# Patient Record
Sex: FEMALE | Race: WHITE | Employment: FULL TIME | URBAN - METROPOLITAN AREA
[De-identification: names, ages, dates, MRNs, and addresses within clinical notes are randomized per-mention and may not be internally consistent; named-entity substitution may affect disease eponyms.]

---

## 2017-02-28 ENCOUNTER — OFFICE VISIT (OUTPATIENT)
Dept: FAMILY MEDICINE CLINIC | Facility: CLINIC | Age: 37
End: 2017-02-28

## 2017-02-28 VITALS
HEIGHT: 66 IN | TEMPERATURE: 98 F | HEART RATE: 72 BPM | SYSTOLIC BLOOD PRESSURE: 116 MMHG | DIASTOLIC BLOOD PRESSURE: 68 MMHG | BODY MASS INDEX: 34.23 KG/M2 | RESPIRATION RATE: 18 BRPM | OXYGEN SATURATION: 98 % | WEIGHT: 213 LBS

## 2017-02-28 DIAGNOSIS — J11.1 INFLUENZA-LIKE ILLNESS: ICD-10-CM

## 2017-02-28 DIAGNOSIS — J02.9 SORE THROAT: Primary | ICD-10-CM

## 2017-02-28 LAB
CONTROL LINE PRESENT WITH A CLEAR BACKGROUND (YES/NO): YES YES/NO
STREP GRP A CUL-SCR: NEGATIVE

## 2017-02-28 PROCEDURE — 87880 STREP A ASSAY W/OPTIC: CPT | Performed by: PHYSICIAN ASSISTANT

## 2017-02-28 PROCEDURE — 99213 OFFICE O/P EST LOW 20 MIN: CPT | Performed by: PHYSICIAN ASSISTANT

## 2017-02-28 RX ORDER — OSELTAMIVIR PHOSPHATE 75 MG/1
75 CAPSULE ORAL 2 TIMES DAILY
Qty: 10 CAPSULE | Refills: 0 | Status: SHIPPED | OUTPATIENT
Start: 2017-02-28 | End: 2017-03-05

## 2017-02-28 NOTE — PROGRESS NOTES
Patient presents with:  Sore Throat: aches, ear pain, x 1 day  :    HPI:   Danette Gonsales is a 39year old female who presents with upper respiratory symptoms for  1  day. States that there has been multiple flu cases at the  where she works.  Patient auscultation. No wheezing, rales or rhonchi. CARDIO: RRR without murmur  GI: good BS's,no masses, HSM or tenderness    ASSESSMENT AND PLAN:   Garima Armijo is a 39year old female who presents with  Influenza-like illness.    Patient has elected to start Ta

## 2017-02-28 NOTE — PATIENT INSTRUCTIONS
Influenza (Adult)    Influenza is also called the flu. It is a viral illness that affects the air passages of your lungs. It is different from the common cold. The flu can easily be passed from one to person to another.  It may be spread through the air b If you are 72 or older, talk with your provider about getting a pneumococcal vaccine every 5 years. You should also get this vaccine if you have chronic asthma or COPD. All adults should get a flu vaccine every fall. Ask your provider about this.   When to

## 2017-04-01 ENCOUNTER — PATIENT MESSAGE (OUTPATIENT)
Dept: FAMILY MEDICINE CLINIC | Facility: CLINIC | Age: 37
End: 2017-04-01

## 2017-04-03 NOTE — TELEPHONE ENCOUNTER
From: Danette Gonsales  To: Berkley Galdamez  Sent: 4/1/2017 8:06 PM CDT  Subject: Non-Urgent Medical Question    I have been working out for over a year. When I first started my plantar fasciites was very painful.  It has improved so much I don't have

## 2017-04-06 ENCOUNTER — OFFICE VISIT (OUTPATIENT)
Dept: FAMILY MEDICINE CLINIC | Facility: CLINIC | Age: 37
End: 2017-04-06

## 2017-04-06 VITALS
SYSTOLIC BLOOD PRESSURE: 112 MMHG | HEART RATE: 76 BPM | DIASTOLIC BLOOD PRESSURE: 86 MMHG | RESPIRATION RATE: 16 BRPM | TEMPERATURE: 99 F

## 2017-04-06 DIAGNOSIS — R20.0 BILATERAL LEG NUMBNESS: Primary | ICD-10-CM

## 2017-04-06 PROCEDURE — 99213 OFFICE O/P EST LOW 20 MIN: CPT | Performed by: PHYSICIAN ASSISTANT

## 2017-04-06 NOTE — PROGRESS NOTES
[facility] Internal Medicine Progress Note    CC:  Patient presents with:  Numbness: Bilateral feet/toes numbness/tingling when working out x2-3 months. No change to color or ability to move.       HPI:   HPI  Bilateral numbness toes/feet 2-3 grisel rub.    No murmur heard. Pulmonary/Chest: Effort normal and breath sounds normal. No respiratory distress. She has no wheezes. She has no rales.    Musculoskeletal:   + strength, sensation, DTRs, pulse intact to bilateral LEs   Lymphadenopathy:     She has

## 2017-04-06 NOTE — PATIENT INSTRUCTIONS
Thank you for choosing Castro Mckeon PA-C at [place] [address]  To Do: Dayday Jean  1. Pt to get EMG done  2. Piriformis testing  3.  Follow-up in 1 month, sooner if problems    • Please signup for MY CHART, which is electronic access to your luis • Please call our office about any questions regarding your treatment/medicines/tests as a result of today's visit.  For your safety, read the entire package insert of all medicines prescribed to you and be aware of all of the risks of treatment even beyon 1. Lie on your back on the floor, with your knees bent and feet flat on the floor. Don’t press your neck or lower back to the floor. 2. Rest your right ankle on your left knee. 3. Place a towel around the back of your left thigh.  Pull on the ends of the

## 2017-04-18 ENCOUNTER — OFFICE VISIT (OUTPATIENT)
Dept: NEUROLOGY | Facility: CLINIC | Age: 37
End: 2017-04-18

## 2017-04-18 DIAGNOSIS — R20.2 PARESTHESIA OF BOTH FEET: Primary | ICD-10-CM

## 2017-04-18 PROCEDURE — 95886 MUSC TEST DONE W/N TEST COMP: CPT | Performed by: OTHER

## 2017-04-18 PROCEDURE — 95909 NRV CNDJ TST 5-6 STUDIES: CPT | Performed by: OTHER

## 2017-04-18 NOTE — PROCEDURES
Date of service: 4/18/2017    Procedure: Nerve Conduction Study and Electromyography - complete EMG study in bilateral lower extremities. History: Electrodiagnostic testing on this 39year old female was performed in bilateral lower extremities.  The pat L. Tibialis anterior Deep peroneal (Fibular) L4-L5 N None None None None N N N N   R. Tibialis anterior Deep peroneal (Fibular) L4-L5 N None None None None N N N N   L. Peroneus longus Perineal L5-S1 N None None None None N N N N   R.  Peroneus longus Perin

## 2017-04-27 ENCOUNTER — PATIENT MESSAGE (OUTPATIENT)
Dept: FAMILY MEDICINE CLINIC | Facility: CLINIC | Age: 37
End: 2017-04-27

## 2017-04-28 NOTE — TELEPHONE ENCOUNTER
From: Geovanni Valle  To: Jammie Yoon  Sent: 4/27/2017 4:50 PM CDT  Subject: Non-Urgent Medical Question    Couple of questions for you. Elder Du I had the nerve test going EMG and I wanted to know if you still wanted to followup with me on the results

## 2017-05-26 ENCOUNTER — OFFICE VISIT (OUTPATIENT)
Dept: FAMILY MEDICINE CLINIC | Facility: CLINIC | Age: 37
End: 2017-05-26

## 2017-05-26 VITALS
HEIGHT: 66 IN | TEMPERATURE: 98 F | BODY MASS INDEX: 34.07 KG/M2 | RESPIRATION RATE: 16 BRPM | DIASTOLIC BLOOD PRESSURE: 76 MMHG | SYSTOLIC BLOOD PRESSURE: 120 MMHG | WEIGHT: 212 LBS | HEART RATE: 84 BPM

## 2017-05-26 DIAGNOSIS — R20.2 LEFT LEG PARESTHESIAS: Primary | ICD-10-CM

## 2017-05-26 DIAGNOSIS — R20.2 RIGHT LEG PARESTHESIAS: ICD-10-CM

## 2017-05-26 DIAGNOSIS — M54.50 BILATERAL LOW BACK PAIN WITHOUT SCIATICA, UNSPECIFIED CHRONICITY: ICD-10-CM

## 2017-05-26 PROCEDURE — 99213 OFFICE O/P EST LOW 20 MIN: CPT | Performed by: PHYSICIAN ASSISTANT

## 2017-05-26 RX ORDER — METHYLPREDNISOLONE 4 MG/1
TABLET ORAL
Qty: 1 KIT | Refills: 0 | Status: SHIPPED | OUTPATIENT
Start: 2017-05-26 | End: 2017-06-29 | Stop reason: ALTCHOICE

## 2017-05-26 NOTE — PATIENT INSTRUCTIONS
Thank you for choosing Blanquita Haile PA-C at [place] [address]  To Do: Rudolph Gregory  1. Pt to begin medications  2. Pt to start physical therapy  3. Pt to get MRI  4.  Follow-up in 1 month, sooner if problems    • Please signup for MY CHART, which is make you healthier and to improve your quality of life.     Referrals, and Radiology Information:    If your insurance requires a referral to a specialist, please allow 5 business days to process your referral request.    If Horald Mcardle, PA-C orders a

## 2017-05-26 NOTE — PROGRESS NOTES
[facility] Internal Medicine Progress Note    CC:  Patient presents with:  Test Results: EMG done on 4/18/17      HPI:   HPI  Bilateral feet numb  Pt states the numbness is still there  Only when she works out  Otherwise they don't feel numb  Had Pulse, sensation, DTRs, pulse intact to bilateral LEs  SLR produces tightness in bilateral LEs  Pain with ROM     Neurological: She is alert and oriented to person, place, and time. Skin: Skin is warm and dry.    Psychiatric: Mood and affect normal.   V

## 2017-05-31 ENCOUNTER — PATIENT MESSAGE (OUTPATIENT)
Dept: FAMILY MEDICINE CLINIC | Facility: CLINIC | Age: 37
End: 2017-05-31

## 2017-05-31 RX ORDER — LORAZEPAM 0.5 MG/1
TABLET ORAL
Qty: 2 TABLET | Refills: 0 | Status: SHIPPED | OUTPATIENT
Start: 2017-05-31 | End: 2017-06-29 | Stop reason: ALTCHOICE

## 2017-05-31 NOTE — TELEPHONE ENCOUNTER
Requesting Medication to help her relax for her MRI Lumbar spine (pt is claustrophobic).    LOV: 5/26/17    RTC: 1 month  Last Labs: 8/2016 cbc and cmp    Future Appointments  Date Time Provider Emma Plummer   6/3/2017 7:15 AM PF MRI RM1 PF MRI [facility]

## 2017-05-31 NOTE — TELEPHONE ENCOUNTER
From: Bandar Lema  To: Gabby Morris  Sent: 5/31/2017 11:09 AM CDT  Subject: Prescription Question    Aj Weller,    I have scheduled my MRI for Saturday morning at 7:15.  They asked me if I am claustrophobic and I said I am and asked if I could re

## 2017-06-03 ENCOUNTER — HOSPITAL ENCOUNTER (OUTPATIENT)
Dept: MRI IMAGING | Age: 37
Discharge: HOME | End: 2017-06-03
Attending: PHYSICIAN ASSISTANT
Payer: COMMERCIAL

## 2017-06-03 DIAGNOSIS — R20.2 LEFT LEG PARESTHESIAS: ICD-10-CM

## 2017-06-03 DIAGNOSIS — R20.2 RIGHT LEG PARESTHESIAS: ICD-10-CM

## 2017-06-03 PROCEDURE — 72148 MRI LUMBAR SPINE W/O DYE: CPT | Performed by: PHYSICIAN ASSISTANT

## 2017-06-04 NOTE — PROGRESS NOTES
Quick Note:    MRI shows minimal degenerative changes  Pt should continue physical therapy  But I would recommend neuro consult, referral for Dr. Juancho Vera  ______

## 2017-06-05 ENCOUNTER — PATIENT MESSAGE (OUTPATIENT)
Dept: FAMILY MEDICINE CLINIC | Facility: CLINIC | Age: 37
End: 2017-06-05

## 2017-06-05 DIAGNOSIS — R20.2 LEFT LEG PARESTHESIAS: Primary | ICD-10-CM

## 2017-06-05 NOTE — TELEPHONE ENCOUNTER
From: Bandar Lema  To: Gabby Morris  Sent: 6/5/2017 2:33 PM CDT  Subject: Visit Follow-up Question    I just received a call to schedule an appointment with Dr. Thomas Rivas (neurologist), could I get more information on this doctor so I can chec

## 2017-06-05 NOTE — TELEPHONE ENCOUNTER
Spoke with patient and informed her that she should still follow up with neurologist Dr. Juanita Vasques. Patient will call her insurance and make sure he is in network and make an appt.

## 2017-06-12 ENCOUNTER — OFFICE VISIT (OUTPATIENT)
Dept: PHYSICAL THERAPY | Age: 37
End: 2017-06-12
Attending: PHYSICIAN ASSISTANT
Payer: COMMERCIAL

## 2017-06-12 DIAGNOSIS — R20.2 LEFT LEG PARESTHESIAS: Primary | ICD-10-CM

## 2017-06-12 DIAGNOSIS — R20.2 RIGHT LEG PARESTHESIAS: ICD-10-CM

## 2017-06-12 PROCEDURE — 97161 PT EVAL LOW COMPLEX 20 MIN: CPT

## 2017-06-12 PROCEDURE — 97110 THERAPEUTIC EXERCISES: CPT

## 2017-06-12 NOTE — PROGRESS NOTES
SPINE EVALUATION:   Referring Physician: Dr. Abby Zaman  Diagnosis: Left leg paresthesias (R20.2)  Right leg paresthesias (R20.2)       Date of Service: 6/12/2017 6/29/17     PATIENT SUMMARY   [place] is a 40year old y/o female who presents to the impairments to help return to prior level of functioning and address goals below    Precautions:  None  OBJECTIVE:   Observation/Posture: increased lordosis  Gait: Coordinated gait  Neuro Screen:  LE's intact to light touch all dermatomes.   Pt c/o intermit PLAN OF CARE:    Goals:    (I) with HEP  Demonstrate proper body mechanics with functional squat and without c/o pain. Able to sleep uninterrupted from pain/symptoms   Able to tolerate 8 hour work day with improved symptoms 75%.     Frequency / Carole Vallecillo

## 2017-06-13 ENCOUNTER — OFFICE VISIT (OUTPATIENT)
Dept: PHYSICAL THERAPY | Age: 37
End: 2017-06-13
Attending: PHYSICIAN ASSISTANT
Payer: COMMERCIAL

## 2017-06-13 DIAGNOSIS — R20.2 LEFT LEG PARESTHESIAS: Primary | ICD-10-CM

## 2017-06-13 DIAGNOSIS — R20.2 RIGHT LEG PARESTHESIAS: ICD-10-CM

## 2017-06-13 PROCEDURE — 97112 NEUROMUSCULAR REEDUCATION: CPT

## 2017-06-13 PROCEDURE — 97110 THERAPEUTIC EXERCISES: CPT

## 2017-06-13 NOTE — PROGRESS NOTES
Dx: Left leg paresthesias (R20.2)  Right leg paresthesias (R20.2)           Authorized # of Visits:  12       Next MD visit: 6/29/17 neurologist  Fall Risk: standard         Precautions: n/a             Subjective: Pt states she feels more stiff today prob

## 2017-06-29 ENCOUNTER — OFFICE VISIT (OUTPATIENT)
Dept: NEUROLOGY | Facility: CLINIC | Age: 37
End: 2017-06-29

## 2017-06-29 VITALS
DIASTOLIC BLOOD PRESSURE: 71 MMHG | SYSTOLIC BLOOD PRESSURE: 113 MMHG | HEART RATE: 84 BPM | HEIGHT: 66 IN | WEIGHT: 218 LBS | RESPIRATION RATE: 16 BRPM | BODY MASS INDEX: 35.03 KG/M2

## 2017-06-29 DIAGNOSIS — R20.2 PARESTHESIAS: Primary | ICD-10-CM

## 2017-06-29 DIAGNOSIS — E53.8 B12 DEFICIENCY: ICD-10-CM

## 2017-06-29 PROCEDURE — 99244 OFF/OP CNSLTJ NEW/EST MOD 40: CPT | Performed by: OTHER

## 2017-06-29 NOTE — PATIENT INSTRUCTIONS
Have labs done  Schedule EMG at earliest convenience in [place] office   Follow up in 6 weeks   Refill policies:    • Allow 2-3 business days for refills; controlled substances may take longer.   • Contact your pharmacy at least 5 days prior to running billed. Precertification and Prior Authorizations  If your physician has recommended that you have a procedure or additional testing performed.   [facility]) will contact your insurance carrier to obtain pre-certification or davin

## 2017-06-29 NOTE — H&P
[facility] New Patient / Consult Visit    Den Emerson is a 40year old female.                          Referring MD: Lynne Gallegos    Patient presents with:  Tingling: C/O of tingling in the feet especially when excercising      HPI: Comment: 1-2 times a week     Family History   Problem Relation Age of Onset   • Diabetes Mother        Allergies:  No Known Allergies   Current Meds:    No current outpatient prescriptions on file.        ROS:   A comprehensive 10 point review of systems w movements    Motor System:  Strength: 5/5 throughout  Tone: normal    Sensory:  Pin is reduced in UE distal to wrists  Vibration is normal  Proprioception is normal  Romberg is absent    Coordination:  Finger to nose normal bilaterally  Rapid alternating m foraminal stenosis at any level.       IMPRESSION AND PLAN:   Kayla Salinas is a 40year old female with past medical history significant for obesity as well as kidney stones, recently found to have B12 deficiency, who presents for evaluation of tingling in

## 2017-07-06 ENCOUNTER — TELEPHONE (OUTPATIENT)
Dept: NEUROLOGY | Facility: CLINIC | Age: 37
End: 2017-07-06

## 2017-07-06 NOTE — TELEPHONE ENCOUNTER
Junita Goldmann reports that starting Tuesday night with an achiness; she woke up yesterday in the middle of the night with soreness to her hands and feet. She has a tight, swollen feeling but her hands and feet are not swollen.     Backs and fronts of her hands are

## 2017-07-07 ENCOUNTER — OFFICE VISIT (OUTPATIENT)
Dept: FAMILY MEDICINE CLINIC | Facility: CLINIC | Age: 37
End: 2017-07-07

## 2017-07-07 VITALS
DIASTOLIC BLOOD PRESSURE: 74 MMHG | TEMPERATURE: 99 F | HEART RATE: 72 BPM | WEIGHT: 216 LBS | HEIGHT: 66 IN | SYSTOLIC BLOOD PRESSURE: 112 MMHG | BODY MASS INDEX: 34.72 KG/M2

## 2017-07-07 DIAGNOSIS — R20.0 NUMBNESS AND TINGLING OF BOTH FEET: ICD-10-CM

## 2017-07-07 DIAGNOSIS — R20.0 NUMBNESS AND TINGLING OF HAND: Primary | ICD-10-CM

## 2017-07-07 DIAGNOSIS — R20.2 NUMBNESS AND TINGLING OF BOTH FEET: ICD-10-CM

## 2017-07-07 DIAGNOSIS — G62.9 NEUROPATHY: ICD-10-CM

## 2017-07-07 DIAGNOSIS — R29.898 HEAVINESS OF UPPER EXTREMITY: ICD-10-CM

## 2017-07-07 DIAGNOSIS — R20.2 NUMBNESS AND TINGLING OF HAND: Primary | ICD-10-CM

## 2017-07-07 PROBLEM — E55.9 VITAMIN D DEFICIENCY: Status: ACTIVE | Noted: 2017-07-07

## 2017-07-07 PROBLEM — E53.8 B12 DEFICIENCY: Status: ACTIVE | Noted: 2017-07-07

## 2017-07-07 PROCEDURE — 99214 OFFICE O/P EST MOD 30 MIN: CPT | Performed by: INTERNAL MEDICINE

## 2017-07-07 NOTE — PATIENT INSTRUCTIONS
Thank you for choosing Nivia Friend MD at [place] [address]  To Do: Sebastián Letters  1. Labs today  2.  Mri brain Call central scheduling [phone] to schedule your test.  Most tests are done in Building A inside ER building next door, or in [place], entire package insert of all medicines prescribed to you and be aware of all of the risks of treatment even beyond those discussed today.  All therapies have potential risk of harm or side effects or medication interactions.  It is your duty and for your s

## 2017-07-07 NOTE — PROGRESS NOTES
[facility] Internal Medicine Office Note  Chief Complaint:   Patient presents with: Other: hand & feet feel swollen x 4 days.       HPI:   This is a 40year old female coming in for  HPI  Complex pt that I haven't seen since 11.2015  Pt reports 1 well-developed and well-nourished. No distress. HENT:   Head: Normocephalic and atraumatic.    Right Ear: Tympanic membrane normal.   Left Ear: Tympanic membrane normal.   Mouth/Throat: Oropharynx is clear and moist.   Eyes: Conjunctivae are normal. Pupil (CPT=70553);  Future  -     VITAMIN B12  -     COMP METABOLIC PANEL (14)  -     CBC WITH DIFFERENTIAL WITH PLATELET  -     TSH W REFLEX TO FREE T4  -     ALEKSANDR AND PROTEIN ELECTROPHORESIS, SERUM  -     HEMOGLOBIN A1C  -     JAMI COMPREHENSIVE PANEL    Heavines side effects or complications from the treatments as a result of today. Patient Active Problem List:     Pain in joint, shoulder region     Rosacea     Heel pain     Obesity (BMI 30-39. 9)     Nephrolithiasis     Paresthesias     Neuropathy (HCC)     B12

## 2017-07-11 PROBLEM — N17.0 ACUTE RENAL FAILURE WITH TUBULAR NECROSIS: Status: ACTIVE | Noted: 2017-07-11

## 2017-07-11 PROBLEM — N17.0 ACUTE RENAL FAILURE WITH TUBULAR NECROSIS (HCC): Status: ACTIVE | Noted: 2017-07-11

## 2017-07-11 LAB
ABSOLUTE BASOPHILS: 54 CELLS/UL (ref 0–200)
ABSOLUTE EOSINOPHILS: 36 CELLS/UL (ref 15–500)
ABSOLUTE LYMPHOCYTES: 2700 CELLS/UL (ref 850–3900)
ABSOLUTE MONOCYTES: 540 CELLS/UL (ref 200–950)
ABSOLUTE NEUTROPHILS: 5670 CELLS/UL (ref 1500–7800)
ALBUMIN/GLOBULIN RATIO: 1.8 (CALC) (ref 1–2.5)
ALBUMIN: 4.4 G/DL (ref 3.8–4.8)
ALBUMIN: 4.6 G/DL (ref 3.6–5.1)
ALKALINE PHOSPHATASE: 82 U/L (ref 33–115)
ALPHA-1-GLOBULINS: 0.3 G/DL (ref 0.2–0.3)
ALPHA-2-GLOBULINS: 0.7 G/DL (ref 0.5–0.9)
ALT: 15 U/L (ref 6–29)
ANA SCREEN, IFA: NEGATIVE
AST: 14 U/L (ref 10–30)
BASOPHILS: 0.6 %
BETA 1 GLOBULIN: 0.4 G/DL (ref 0.4–0.6)
BETA 2 GLOBULIN: 0.4 G/DL (ref 0.2–0.5)
BILIRUBIN, TOTAL: 0.3 MG/DL (ref 0.2–1.2)
BUN/CREATININE RATIO: 15 (CALC) (ref 6–22)
BUN: 19 MG/DL (ref 7–25)
CALCIUM: 10 MG/DL (ref 8.6–10.2)
CARBON DIOXIDE: 28 MMOL/L (ref 20–31)
CHLORIDE: 100 MMOL/L (ref 98–110)
CREATINE KINASE, TOTAL: 97 U/L (ref 29–143)
CREATININE: 1.23 MG/DL (ref 0.5–1.1)
EGFR IF AFRICN AM: 65 ML/MIN/1.73M2
EGFR IF NONAFRICN AM: 56 ML/MIN/1.73M2
EOSINOPHILS: 0.4 %
GAMMA GLOBULINS: 1 G/DL (ref 0.8–1.7)
GLOBULIN: 2.6 G/DL (CALC) (ref 1.9–3.7)
GLUCOSE: 85 MG/DL (ref 65–99)
HEMATOCRIT: 42.2 % (ref 35–45)
HEMOGLOBIN A1C: 5.4 % OF TOTAL HGB
HEMOGLOBIN: 13.6 G/DL (ref 11.7–15.5)
LYMPHOCYTES: 30 %
MCH: 27.9 PG (ref 27–33)
MCHC: 32.2 G/DL (ref 32–36)
MCV: 86.5 FL (ref 80–100)
MONOCYTES: 6 %
MPV: 10.6 FL (ref 7.5–12.5)
NEUTROPHILS: 63 %
PLATELET COUNT: 316 THOUSAND/UL (ref 140–400)
POTASSIUM: 4.6 MMOL/L (ref 3.5–5.3)
PROTEIN, TOTAL: 7.2 G/DL (ref 6.1–8.1)
PROTEIN, TOTAL: 7.2 G/DL (ref 6.1–8.1)
RDW: 12.3 % (ref 11–15)
RED BLOOD CELL COUNT: 4.88 MILLION/UL (ref 3.8–5.1)
SED RATE BY MODIFIED$WESTERGREN: 9 MM/H
SODIUM: 139 MMOL/L (ref 135–146)
TSH W/REFLEX TO FT4: 1.59 MIU/L
VITAMIN B12: 515 PG/ML (ref 200–1100)
WHITE BLOOD CELL COUNT: 9 THOUSAND/UL (ref 3.8–10.8)

## 2017-07-11 NOTE — PROGRESS NOTES
Autoimmune and muscle tests are all normal and fine  b12 and vitamins are all ok, but her Cr is abnormal.  Maybe there's something wrong with kidney, recommend the following tests    Repeat BMP  urinanalysis  Urine Na random  Urine Cr random  Kidney ultras

## 2017-07-12 ENCOUNTER — HOSPITAL ENCOUNTER (OUTPATIENT)
Dept: ULTRASOUND IMAGING | Age: 37
Discharge: HOME | End: 2017-07-12
Attending: INTERNAL MEDICINE
Payer: COMMERCIAL

## 2017-07-12 ENCOUNTER — TELEPHONE (OUTPATIENT)
Dept: FAMILY MEDICINE CLINIC | Facility: CLINIC | Age: 37
End: 2017-07-12

## 2017-07-12 DIAGNOSIS — N28.1 CYST OF RIGHT KIDNEY: ICD-10-CM

## 2017-07-12 DIAGNOSIS — R79.89 ELEVATED SERUM CREATININE: ICD-10-CM

## 2017-07-12 DIAGNOSIS — R79.89 ELEVATED SERUM CREATININE: Primary | ICD-10-CM

## 2017-07-12 DIAGNOSIS — N28.1 CYST OF LEFT KIDNEY: ICD-10-CM

## 2017-07-12 DIAGNOSIS — N20.0 KIDNEY STONES: ICD-10-CM

## 2017-07-12 PROCEDURE — 76775 US EXAM ABDO BACK WALL LIM: CPT | Performed by: INTERNAL MEDICINE

## 2017-07-12 NOTE — TELEPHONE ENCOUNTER
Patient informed that the kidney stones are non obstructing and cysts will need evaluation - but she is not in any pain currently so I explained that August 4th would likely be okay.    I advised her to call Dr. Lisa Macdonald office and have RN check with MD

## 2017-07-12 NOTE — PROGRESS NOTES
She has kidney stones and a kidney cyst on L side- refer her to urology dr Giovanny Gerardo for eval

## 2017-07-13 ENCOUNTER — PATIENT MESSAGE (OUTPATIENT)
Dept: FAMILY MEDICINE CLINIC | Facility: CLINIC | Age: 37
End: 2017-07-13

## 2017-07-13 NOTE — TELEPHONE ENCOUNTER
From: Sarah Combs  To: Misael Moreno MD  Sent: 7/13/2017 12:47 PM CDT  Subject: Test Results Question    Since I am heading out of town on Saturday, July 15, if I continue to have the dull pain on my left side, is there anything I should be taking for

## 2017-07-13 NOTE — TELEPHONE ENCOUNTER
When I went to the ultrasound yesterday on my Kidney's they asked if I had any pain.  I told them that my left side on my back and hip were hurting.  It isn't a horrible pain, it is just sore and tender and didn't know if it was related to the kidney stone

## 2017-07-14 ENCOUNTER — PATIENT MESSAGE (OUTPATIENT)
Dept: FAMILY MEDICINE CLINIC | Facility: CLINIC | Age: 37
End: 2017-07-14

## 2017-07-14 LAB
APPEARANCE: CLEAR
BILIRUBIN: NEGATIVE
BUN: 17 MG/DL (ref 7–25)
CALCIUM: 9.5 MG/DL (ref 8.6–10.2)
CARBON DIOXIDE: 25 MMOL/L (ref 20–31)
CHLORIDE: 103 MMOL/L (ref 98–110)
COLOR: YELLOW
CREATININE, RANDOM URINE: 211 MG/DL (ref 20–320)
CREATININE: 0.86 MG/DL (ref 0.5–1.1)
EGFR IF AFRICN AM: 100 ML/MIN/1.73M2
EGFR IF NONAFRICN AM: 86 ML/MIN/1.73M2
GLUCOSE: 93 MG/DL (ref 65–99)
GLUCOSE: NEGATIVE
KETONES: NEGATIVE
LEUKOCYTE ESTERASE: NEGATIVE
NITRITE: NEGATIVE
OCCULT BLOOD: NEGATIVE
PH: 6 (ref 5–8)
POTASSIUM: 4.2 MMOL/L (ref 3.5–5.3)
PROTEIN: NEGATIVE
SODIUM: 136 MMOL/L (ref 135–146)
SPECIFIC GRAVITY: 1.02 (ref 1–1.03)

## 2017-07-14 NOTE — TELEPHONE ENCOUNTER
From: Equilla File  To: Suzy Villa MD  Sent: 7/14/2017 7:45 AM CDT  Subject: Test Results Question    I have a question about BASIC METABOLIC PANEL (8) resulted on 7/14/17 at 6:05 AM.    With these results, what will the next step be?  I still have s

## 2017-07-14 NOTE — TELEPHONE ENCOUNTER
The pain should not be related to kidney, a stone that small and cysts won't do the pain    So I don't know what to say but the pain is maybe musculoskel or something else. Sure maybe related to the neuro condition possible.     It would require another

## 2017-08-04 PROBLEM — N20.1 URETERAL CALCULUS: Status: ACTIVE | Noted: 2017-08-04

## 2017-08-11 ENCOUNTER — TELEPHONE (OUTPATIENT)
Dept: FAMILY MEDICINE CLINIC | Facility: CLINIC | Age: 37
End: 2017-08-11

## 2017-08-11 NOTE — TELEPHONE ENCOUNTER
Patient called back stating she got a mychart message about outstanding labs. Reviewed with patient labs outstanding resolved/duplicates. Patient will disregard letter.

## 2017-08-11 NOTE — TELEPHONE ENCOUNTER
Rene Mattson- Patient would like to speak with a nurse to discuss her outstanding lab work. Please advise.

## 2017-08-18 PROBLEM — R10.9 FLANK PAIN: Status: ACTIVE | Noted: 2017-08-18

## 2017-08-19 ENCOUNTER — PATIENT MESSAGE (OUTPATIENT)
Dept: FAMILY MEDICINE CLINIC | Facility: CLINIC | Age: 37
End: 2017-08-19

## 2017-08-21 NOTE — TELEPHONE ENCOUNTER
From: Angelique Alert  To: Florence Smith MD  Sent: 8/19/2017 1:22 PM CDT  Subject: Test Results Question    I recently saw my urologist and was treated for kidney stones and found out yesterday that I actually wasn't passing stones.  Now on my ct scan it s

## 2017-08-27 ENCOUNTER — HOSPITAL ENCOUNTER (EMERGENCY)
Age: 37
Discharge: HOME | End: 2017-08-27
Attending: EMERGENCY MEDICINE
Payer: COMMERCIAL

## 2017-08-27 ENCOUNTER — APPOINTMENT (OUTPATIENT)
Dept: CT IMAGING | Age: 37
End: 2017-08-27
Attending: EMERGENCY MEDICINE
Payer: COMMERCIAL

## 2017-08-27 VITALS
HEIGHT: 66 IN | RESPIRATION RATE: 18 BRPM | SYSTOLIC BLOOD PRESSURE: 122 MMHG | DIASTOLIC BLOOD PRESSURE: 69 MMHG | BODY MASS INDEX: 32.14 KG/M2 | OXYGEN SATURATION: 98 % | TEMPERATURE: 98 F | WEIGHT: 200 LBS | HEART RATE: 74 BPM

## 2017-08-27 DIAGNOSIS — M54.9 MODERATE BACK PAIN: Primary | ICD-10-CM

## 2017-08-27 LAB
BASOPHILS # BLD AUTO: 0.03 X10(3) UL (ref 0–0.1)
BASOPHILS NFR BLD AUTO: 0.4 %
BILIRUB UR QL STRIP.AUTO: NEGATIVE
BUN BLD-MCNC: 18 MG/DL (ref 8–20)
CALCIUM BLD-MCNC: 8.3 MG/DL (ref 8.3–10.3)
CHLORIDE: 108 MMOL/L (ref 101–111)
CO2: 24 MMOL/L (ref 22–32)
COLOR UR AUTO: YELLOW
CREAT BLD-MCNC: 0.87 MG/DL (ref 0.55–1.02)
EOSINOPHIL # BLD AUTO: 0.11 X10(3) UL (ref 0–0.3)
EOSINOPHIL NFR BLD AUTO: 1.4 %
ERYTHROCYTE [DISTWIDTH] IN BLOOD BY AUTOMATED COUNT: 12.5 % (ref 11.5–16)
GLUCOSE BLD-MCNC: 103 MG/DL (ref 70–99)
GLUCOSE UR STRIP.AUTO-MCNC: NEGATIVE MG/DL
HCT VFR BLD AUTO: 39.1 % (ref 34–50)
HGB BLD-MCNC: 12.7 G/DL (ref 12–16)
IMMATURE GRANULOCYTE COUNT: 0.03 X10(3) UL (ref 0–1)
IMMATURE GRANULOCYTE RATIO %: 0.4 %
KETONES UR STRIP.AUTO-MCNC: NEGATIVE MG/DL
LEUKOCYTE ESTERASE UR QL STRIP.AUTO: NEGATIVE
LYMPHOCYTES # BLD AUTO: 3.06 X10(3) UL (ref 0.9–4)
LYMPHOCYTES NFR BLD AUTO: 38.3 %
MCH RBC QN AUTO: 28.8 PG (ref 27–33.2)
MCHC RBC AUTO-ENTMCNC: 32.5 G/DL (ref 31–37)
MCV RBC AUTO: 88.7 FL (ref 81–100)
MONOCYTES # BLD AUTO: 0.59 X10(3) UL (ref 0.1–0.6)
MONOCYTES NFR BLD AUTO: 7.4 %
NEUTROPHIL ABS PRELIM: 4.18 X10 (3) UL (ref 1.3–6.7)
NEUTROPHILS # BLD AUTO: 4.18 X10(3) UL (ref 1.3–6.7)
NEUTROPHILS NFR BLD AUTO: 52.1 %
NITRITE UR QL STRIP.AUTO: NEGATIVE
PH UR STRIP.AUTO: 6 [PH] (ref 4.5–8)
PLATELET # BLD AUTO: 244 10(3)UL (ref 150–450)
POCT LOT NUMBER: NORMAL
POCT URINE PREGNANCY: NEGATIVE
POTASSIUM SERPL-SCNC: 3.6 MMOL/L (ref 3.6–5.1)
PROCEDURE CONTROL: NORMAL
PROT UR STRIP.AUTO-MCNC: NEGATIVE MG/DL
RBC # BLD AUTO: 4.41 X10(6)UL (ref 3.8–5.1)
RED CELL DISTRIBUTION WIDTH-SD: 41.1 FL (ref 35.1–46.3)
SODIUM SERPL-SCNC: 139 MMOL/L (ref 136–144)
SP GR UR STRIP.AUTO: >=1.03 (ref 1–1.03)
UROBILINOGEN UR STRIP.AUTO-MCNC: 0.2 MG/DL
WBC # BLD AUTO: 8 X10(3) UL (ref 4–13)

## 2017-08-27 PROCEDURE — 96374 THER/PROPH/DIAG INJ IV PUSH: CPT

## 2017-08-27 PROCEDURE — 80048 BASIC METABOLIC PNL TOTAL CA: CPT | Performed by: EMERGENCY MEDICINE

## 2017-08-27 PROCEDURE — 99284 EMERGENCY DEPT VISIT MOD MDM: CPT

## 2017-08-27 PROCEDURE — 74176 CT ABD & PELVIS W/O CONTRAST: CPT | Performed by: EMERGENCY MEDICINE

## 2017-08-27 PROCEDURE — 81025 URINE PREGNANCY TEST: CPT

## 2017-08-27 PROCEDURE — 96361 HYDRATE IV INFUSION ADD-ON: CPT

## 2017-08-27 PROCEDURE — 85025 COMPLETE CBC W/AUTO DIFF WBC: CPT | Performed by: EMERGENCY MEDICINE

## 2017-08-27 PROCEDURE — 81003 URINALYSIS AUTO W/O SCOPE: CPT | Performed by: EMERGENCY MEDICINE

## 2017-08-27 RX ORDER — KETOROLAC TROMETHAMINE 30 MG/ML
30 INJECTION, SOLUTION INTRAMUSCULAR; INTRAVENOUS ONCE
Status: COMPLETED | OUTPATIENT
Start: 2017-08-27 | End: 2017-08-27

## 2017-08-27 RX ORDER — CYCLOBENZAPRINE HCL 10 MG
10 TABLET ORAL 3 TIMES DAILY PRN
Qty: 15 TABLET | Refills: 0 | Status: SHIPPED | OUTPATIENT
Start: 2017-08-27 | End: 2017-08-30

## 2017-08-27 NOTE — ED PROVIDER NOTES
Patient Seen in: [facility] In [place]    History   Patient presents with:  Back Pain (musculoskeletal)    Stated Complaint: lower back since last night, history of kidney stones    HPI    40-year-old female presents with left flank p Smokeless tobacco: Never Used                      Alcohol use:  Yes              Comment: 1-2 times a week       Review of Systems    Positive for stated complaint: lower back since last night, history of kidney stones  Other systems are as noted in HP Narrative: The following orders were created for panel order CBC WITH DIFFERENTIAL WITH PLATELET.   Procedure                               Abnormality         Status                     ---------                               -----------         ------ Normal.  No enlargement or focal lesion. AORTA/VASCULAR:  Normal.  No aneurysm. RETROPERITONEUM:  Normal.  No mass or adenopathy. BOWEL/MESENTERY:  Normal in caliber including the appendix. Scattered mesenteric nodes are stable.  ABDOMINAL WALL:  Small f

## 2017-08-28 ENCOUNTER — TELEPHONE (OUTPATIENT)
Dept: FAMILY MEDICINE CLINIC | Facility: CLINIC | Age: 37
End: 2017-08-28

## 2017-08-29 ENCOUNTER — TELEPHONE (OUTPATIENT)
Dept: FAMILY MEDICINE CLINIC | Facility: CLINIC | Age: 37
End: 2017-08-29

## 2017-08-29 RX ORDER — BACLOFEN 20 MG/1
20 TABLET ORAL 3 TIMES DAILY
Qty: 30 TABLET | Refills: 0 | Status: SHIPPED | OUTPATIENT
Start: 2017-08-29 | End: 2017-10-17

## 2017-08-29 NOTE — TELEPHONE ENCOUNTER
Pt has made er follow. No appointments available today, is scheduled tomorrow.   Very insistent about getting a different medication until tomorrow as she is in a lot of pain and says if we cant get her in today that we should at least help her out until t

## 2017-08-29 NOTE — TELEPHONE ENCOUNTER
Patient is having low back pain - sciatica. Worse today. Went to ER on Sunday for treatment. Is on cyclobenzaprine 10mg tid which is not working for her spasms.  She is using tylenol #3 for the pain which helps control that - but she is asking if you can

## 2017-08-29 NOTE — TELEPHONE ENCOUNTER
Time started: 10:41am    Time ended: 10:42am    Total time spent on chart: 1 min    Patient aware RX sent into her pharm    Future Appointments  Date Time Provider Emma Plummer   8/30/2017 11:00 AM Erendira TORREZ PA-C EM[facility] [address]

## 2017-08-29 NOTE — TELEPHONE ENCOUNTER
Pt called states she was seen in the ER over the weekend for back spasms was given a  muscle relaxer states rx not working does not feel any better

## 2017-08-30 ENCOUNTER — OFFICE VISIT (OUTPATIENT)
Dept: FAMILY MEDICINE CLINIC | Facility: CLINIC | Age: 37
End: 2017-08-30

## 2017-08-30 VITALS
SYSTOLIC BLOOD PRESSURE: 112 MMHG | RESPIRATION RATE: 16 BRPM | WEIGHT: 217 LBS | HEART RATE: 76 BPM | HEIGHT: 66 IN | DIASTOLIC BLOOD PRESSURE: 62 MMHG | BODY MASS INDEX: 34.87 KG/M2

## 2017-08-30 DIAGNOSIS — S39.012A STRAIN OF LUMBAR REGION, INITIAL ENCOUNTER: Primary | ICD-10-CM

## 2017-08-30 PROCEDURE — 99213 OFFICE O/P EST LOW 20 MIN: CPT | Performed by: PHYSICIAN ASSISTANT

## 2017-08-30 RX ORDER — HYDROCODONE BITARTRATE AND ACETAMINOPHEN 5; 325 MG/1; MG/1
1 TABLET ORAL EVERY 8 HOURS PRN
Qty: 30 TABLET | Refills: 0 | Status: SHIPPED | OUTPATIENT
Start: 2017-08-30 | End: 2017-10-17

## 2017-08-30 RX ORDER — METHYLPREDNISOLONE 4 MG/1
TABLET ORAL
Qty: 1 KIT | Refills: 0 | Status: SHIPPED | OUTPATIENT
Start: 2017-08-30 | End: 2017-10-17

## 2017-08-30 RX ORDER — METHOCARBAMOL 750 MG/1
750 TABLET, FILM COATED ORAL 3 TIMES DAILY
Qty: 60 TABLET | Refills: 0 | Status: SHIPPED | OUTPATIENT
Start: 2017-08-30 | End: 2017-10-17

## 2017-08-30 NOTE — PATIENT INSTRUCTIONS
Thank you for choosing Geoffrey Haer PA-C at [facility]  To Do: Rachel Cooney  1. Pt to begin medications as prescribed, discontinue baclofen  2. Start physical therapy  3. Heat to area  4.  Follow-up in 1 month if still having problems  Ef treatment even beyond those discussed today.  All therapies have potential risk of harm or side effects or medication interactions.  It is your duty and for your safety to discuss with the pharmacist and our office with questions, and to notify us and stop

## 2017-08-30 NOTE — PROGRESS NOTES
[facility] Internal Medicine Progress Note    CC:  Patient presents with:  Low Back Pain: Pt was in the e.r due to lower back pain on the left side pt doesnt feel any better       HPI:   HPI  ER follow-up, low back pain, went to ER on 8/27/17  Sa Mouth/Throat: Oropharynx is clear and moist. No oropharyngeal exudate. Eyes: EOM are normal. Pupils are equal, round, and reactive to light. Cardiovascular: Normal rate, regular rhythm and normal heart sounds.   Exam reveals no gallop and no friction ru Nephrolithiasis     Paresthesias     Neuropathy (HCC)     B12 deficiency     Vitamin D deficiency     Numbness and tingling of both feet     Numbness and tingling of hand     Heaviness of upper extremity     Acute renal failure with tubular necrosis (HC

## 2017-09-05 ENCOUNTER — PATIENT MESSAGE (OUTPATIENT)
Dept: FAMILY MEDICINE CLINIC | Facility: CLINIC | Age: 37
End: 2017-09-05

## 2017-09-05 NOTE — TELEPHONE ENCOUNTER
From: [place]  To: Johnna Kim  Sent: 9/5/2017 12:20 PM CDT  Subject: Visit Follow-up Question    I saw you last week about a muscle spasm. I finished the prednisone yesterday and was feeling pretty good.  Now last night I started to

## 2017-09-06 ENCOUNTER — OFFICE VISIT (OUTPATIENT)
Dept: NEUROLOGY | Facility: CLINIC | Age: 37
End: 2017-09-06

## 2017-09-06 DIAGNOSIS — Z02.89 REFERRED BY HEALTH CARE PROFESSIONAL: Primary | ICD-10-CM

## 2017-09-12 ENCOUNTER — OFFICE VISIT (OUTPATIENT)
Dept: PHYSICAL THERAPY | Age: 37
End: 2017-09-12
Attending: PHYSICIAN ASSISTANT
Payer: COMMERCIAL

## 2017-09-12 DIAGNOSIS — S39.012A STRAIN OF LUMBAR REGION, INITIAL ENCOUNTER: ICD-10-CM

## 2017-09-12 PROCEDURE — 97530 THERAPEUTIC ACTIVITIES: CPT | Performed by: PHYSICAL THERAPIST

## 2017-09-12 PROCEDURE — 97161 PT EVAL LOW COMPLEX 20 MIN: CPT | Performed by: PHYSICAL THERAPIST

## 2017-09-12 NOTE — PROGRESS NOTES
SPINE EVALUATION:   Referring Physician: Dr. Leana Cervantes  Diagnosis: Lumbar strain      Date of Service: 9/12/2017     PATIENT SUMMARY   Arlin Pablo is a 40year old y/o female who presents to therapy today with complaints of low back pain that has b vs left. Demonstrates ability to squat with heels in contact with the floor, there is decreased hip mobility and increased anterior knee drive     Special tests: Slump produces lumbar pain. Quadruped - pain produced with rocking when lumbar curve is rever Date____________________    Certification From: 2/76/6775  To:12/11/2017

## 2017-09-18 ENCOUNTER — OFFICE VISIT (OUTPATIENT)
Dept: PHYSICAL THERAPY | Age: 37
End: 2017-09-18
Attending: PHYSICIAN ASSISTANT
Payer: COMMERCIAL

## 2017-09-18 PROCEDURE — 97112 NEUROMUSCULAR REEDUCATION: CPT | Performed by: PHYSICAL THERAPIST

## 2017-09-18 PROCEDURE — 97110 THERAPEUTIC EXERCISES: CPT | Performed by: PHYSICAL THERAPIST

## 2017-09-18 NOTE — PROGRESS NOTES
Dx: Lumbar strain         Authorized # of Visits:  8         Next MD visit: none scheduled  Fall Risk: standard         Precautions: n/a             Subjective:  The patient states she gets her pain mostly with sitting but a lumbar support does help with th

## 2017-10-17 ENCOUNTER — OFFICE VISIT (OUTPATIENT)
Dept: FAMILY MEDICINE CLINIC | Facility: CLINIC | Age: 37
End: 2017-10-17

## 2017-10-17 VITALS
SYSTOLIC BLOOD PRESSURE: 128 MMHG | HEIGHT: 66 IN | BODY MASS INDEX: 35.52 KG/M2 | HEART RATE: 80 BPM | DIASTOLIC BLOOD PRESSURE: 78 MMHG | WEIGHT: 221 LBS | RESPIRATION RATE: 16 BRPM | TEMPERATURE: 99 F

## 2017-10-17 DIAGNOSIS — M47.816 OSTEOARTHRITIS OF LUMBAR SPINE, UNSPECIFIED SPINAL OSTEOARTHRITIS COMPLICATION STATUS: Primary | ICD-10-CM

## 2017-10-17 DIAGNOSIS — F41.9 ANXIETY: ICD-10-CM

## 2017-10-17 DIAGNOSIS — F51.01 PRIMARY INSOMNIA: ICD-10-CM

## 2017-10-17 PROBLEM — R10.9 FLANK PAIN: Status: RESOLVED | Noted: 2017-08-18 | Resolved: 2017-10-17

## 2017-10-17 PROBLEM — N17.0 ACUTE RENAL FAILURE WITH TUBULAR NECROSIS: Status: RESOLVED | Noted: 2017-07-11 | Resolved: 2017-10-17

## 2017-10-17 PROBLEM — K42.9 UMBILICAL HERNIA: Status: ACTIVE | Noted: 2017-10-17

## 2017-10-17 PROBLEM — N17.0 ACUTE RENAL FAILURE WITH TUBULAR NECROSIS (HCC): Status: RESOLVED | Noted: 2017-07-11 | Resolved: 2017-10-17

## 2017-10-17 PROBLEM — N20.1 URETERAL CALCULUS: Status: RESOLVED | Noted: 2017-08-04 | Resolved: 2017-10-17

## 2017-10-17 PROCEDURE — 90686 IIV4 VACC NO PRSV 0.5 ML IM: CPT | Performed by: INTERNAL MEDICINE

## 2017-10-17 PROCEDURE — 90471 IMMUNIZATION ADMIN: CPT | Performed by: INTERNAL MEDICINE

## 2017-10-17 PROCEDURE — 99214 OFFICE O/P EST MOD 30 MIN: CPT | Performed by: INTERNAL MEDICINE

## 2017-10-17 RX ORDER — DIAZEPAM 2 MG/1
2 TABLET ORAL NIGHTLY PRN
Qty: 30 TABLET | Refills: 0 | Status: SHIPPED | COMMUNITY
Start: 2017-10-17 | End: 2017-11-07

## 2017-10-17 NOTE — PROGRESS NOTES
[facility] Internal Medicine Office Note  Chief Complaint:   Patient presents with:  Back Pain  Anxiety  Insomnia      HPI:   This is a 40year old female coming in for  HPI  Chronic back pain  Pt c/o chronic low back pain center low back worse a hair  Physical Exam    Constitutional: She is oriented to person, place, and time. She appears well-developed and well-nourished. No distress. HENT:   Head: Normocephalic and atraumatic.    Right Ear: Tympanic membrane normal.   Left Ear: Tympanic membran anxiety and insomnia.     Diagnoses and all orders for this visit:    Osteoarthritis of lumbar spine, unspecified spinal osteoarthritis complication status  chronic stable issue, continue present management with observation and medications as noted    - the last two weeks)?: Not at all    PHQ-2 SCORE: 0

## 2017-10-17 NOTE — PATIENT INSTRUCTIONS
Thank you for choosing Jorge Zabala MD at [place] [address]  To Do: Sammi Cooney  1. Chiropractor see dr Miguel Bagley  2. Sleep and stress- try valium diazepam nightly  3. See [facility] provided by [facility].   Z Mon-Fri at 8am-7:30pm, and Sat/Sun 9am-430pm  Also at [address]  Call [phone] for info    • Please call our office about any questions regarding your treatment/medicines/tests as a result of today's visit.  For your safety, read the e

## 2017-11-06 ENCOUNTER — PATIENT MESSAGE (OUTPATIENT)
Dept: FAMILY MEDICINE CLINIC | Facility: CLINIC | Age: 37
End: 2017-11-06

## 2017-11-07 ENCOUNTER — PATIENT MESSAGE (OUTPATIENT)
Dept: FAMILY MEDICINE CLINIC | Facility: CLINIC | Age: 37
End: 2017-11-07

## 2017-11-07 NOTE — TELEPHONE ENCOUNTER
From: Beltran Hamlin  To: Ivania Bay MD  Sent: 11/6/2017 7:46 PM CST  Subject: Prescription Question    Dr. Jania Ortiz,   I received your note today about your leave from the practice.  Now that I finally am getting answers to what has been going on with me,

## 2017-11-07 NOTE — TELEPHONE ENCOUNTER
From: Angelique Alert  Sent: 11/7/2017 7:38 AM CST  Subject: Medication Renewal Request    Scottbarry Hopkins would like a refill of the following medications:     diazepam (VALIUM) 2 MG Oral Tab Kings Gomez MD]    Preferred pharmacy: [place] [address]

## 2017-11-08 RX ORDER — DIAZEPAM 2 MG/1
2 TABLET ORAL NIGHTLY PRN
Qty: 30 TABLET | Refills: 4 | Status: SHIPPED
Start: 2017-11-08 | End: 2018-03-30 | Stop reason: ALTCHOICE

## 2017-11-08 NOTE — TELEPHONE ENCOUNTER
Requesting Diazepam   LOV: 10/17/17  RTC: none   Last Relevant Labs: n/a   Filled: 10/17/17 #30 with 0 refills    No future appointments.     [place]  10[place]17/17 [place] #30/30    Pended if okay

## 2017-11-08 NOTE — TELEPHONE ENCOUNTER
From: Luciana Rothman  To: Talia Carter MD  Sent: 11/7/2017 7:53 PM CST  Subject: Visit Follow-up Question    Can you note that I have had a physical because my chart still states it is past due.     Thank you,   Emmanuel Muñoz

## 2017-11-20 ENCOUNTER — APPOINTMENT (OUTPATIENT)
Dept: GENERAL RADIOLOGY | Age: 37
End: 2017-11-20
Attending: EMERGENCY MEDICINE
Payer: OTHER MISCELLANEOUS

## 2017-11-20 ENCOUNTER — HOSPITAL ENCOUNTER (EMERGENCY)
Age: 37
Discharge: HOME | End: 2017-11-20
Attending: EMERGENCY MEDICINE
Payer: OTHER MISCELLANEOUS

## 2017-11-20 VITALS
HEIGHT: 66 IN | SYSTOLIC BLOOD PRESSURE: 115 MMHG | TEMPERATURE: 99 F | RESPIRATION RATE: 16 BRPM | OXYGEN SATURATION: 98 % | HEART RATE: 81 BPM | DIASTOLIC BLOOD PRESSURE: 76 MMHG | WEIGHT: 200 LBS | BODY MASS INDEX: 32.14 KG/M2

## 2017-11-20 DIAGNOSIS — S63.641A: Primary | ICD-10-CM

## 2017-11-20 PROCEDURE — 99284 EMERGENCY DEPT VISIT MOD MDM: CPT

## 2017-11-20 PROCEDURE — 73130 X-RAY EXAM OF HAND: CPT | Performed by: EMERGENCY MEDICINE

## 2017-11-20 PROCEDURE — 29125 APPL SHORT ARM SPLINT STATIC: CPT

## 2017-11-20 PROCEDURE — 99283 EMERGENCY DEPT VISIT LOW MDM: CPT

## 2017-11-20 RX ORDER — IBUPROFEN 600 MG/1
600 TABLET ORAL ONCE
Status: COMPLETED | OUTPATIENT
Start: 2017-11-20 | End: 2017-11-20

## 2017-11-20 NOTE — ED PROVIDER NOTES
Patient Seen in: [facility] In [place]    History   Patient presents with:  Upper Extremity Injury (musculoskeletal)    Stated Complaint: right thumb injury at work    HPI    Left handed 40-year-old with a history of neuropathy, johnny awake alert no acute distress. Right upper extremity: Moderate tenderness to the right first metacarpal, first PIP and in the webspace between the first and second metacarpals.   There is laxity to the thumb joint suggesting ulnar collateral ligament injur [address] [facility] [address] [phone]    In 3 days          Medications Prescribed:  Current Discharge Medication List

## 2017-11-20 NOTE — ED INITIAL ASSESSMENT (HPI)
r thumb inj- at work, climbing up into back of bus- lost balance and caught herself with r thumb- pain and bruising to area

## 2017-11-21 ENCOUNTER — TELEPHONE (OUTPATIENT)
Dept: FAMILY MEDICINE CLINIC | Facility: CLINIC | Age: 37
End: 2017-11-21

## 2017-11-21 NOTE — TELEPHONE ENCOUNTER
Pt was seen in the ER for ruptured tendon in right thumb,   Pt states she is having swelling in other fingers, no pain. PT states she has an appt with ortho tomorrow. Advised pt elevated, use ice and try to move fingers.  Advised pt if pain does start and i

## 2017-11-21 NOTE — TELEPHONE ENCOUNTER
Seen in the ER for ruptured tendon in her thumb states her other fingers have been swelling would like to know if that is normal since her hand is in a spling

## 2017-11-22 ENCOUNTER — APPOINTMENT (OUTPATIENT)
Dept: OTHER | Age: 37
End: 2017-11-22
Attending: PREVENTIVE MEDICINE
Payer: OTHER MISCELLANEOUS

## 2017-12-12 RX ORDER — DIAZEPAM 2 MG/1
2 TABLET ORAL NIGHTLY PRN
Qty: 30 TABLET | Refills: 4 | Status: CANCELLED
Start: 2017-12-12

## 2017-12-13 NOTE — TELEPHONE ENCOUNTER
Requesting Valium 2 mg  LOV: 10/17/17  RTC: n.a  Last Labs: 8/27/17  Filled: 11/08/17 #30 with 4 refills-advised pt to call pharmacy for refill    No future appointments.     [place] last [place] 11/10/2017 DIAZEPAM 2MG 30/30 supplied    Time st

## 2018-01-11 RX ORDER — DIAZEPAM 2 MG/1
2 TABLET ORAL NIGHTLY PRN
Qty: 30 TABLET | Refills: 4 | Status: CANCELLED
Start: 2018-01-11

## 2018-01-11 NOTE — TELEPHONE ENCOUNTER
From: Dontae Ulrich  Sent: 1/11/2018 7:54 AM CST  Subject: Medication Renewal Request    Sade Stevens would like a refill of the following medications:     diazepam (VALIUM) 2 MG Oral Tab Magdaleno Fletcher MD]    Preferred pharmacy: [place] [address]

## 2018-01-11 NOTE — TELEPHONE ENCOUNTER
Requesting Valium 2 mg  LOV: 10/17/17  RTC: n/a  Last Labs: 8/27/17  Filled: Filled: 11/08/17 #30 with 4 refills-advised pt to call pharmacy for refill    No future appointments.   [place],   Dispensed Written Strength Form Quantity Refills Days

## 2018-03-15 NOTE — PROGRESS NOTES
[facility] Internal Medicine Progress Note    CC:  Patient presents with:   Anxiety: has gotten worse since her workmans comp injury - injury happen 11/20/17 she ruptured a tendon in her thumb      HPI:   HPI  Anxiety  Pt saw Dr. Kathe Helton in October an dizziness, light-headedness and headaches. Psychiatric/Behavioral: Positive for agitation, decreased concentration, dysphoric mood and sleep disturbance. Negative for self-injury and suicidal ideas. The patient is nervous/anxious.  The patient is not hype for this Visit:  Signed Prescriptions Disp Refills    Venlafaxine HCl ER 37.5 MG Oral Capsule SR 24 Hr 30 capsule 0      Sig: Take 1 capsule (37.5 mg total) by mouth daily.       ClonazePAM 0.5 MG Oral Tab 60 tablet 0      Sig: Take 1 tablet (0.5 mg total)

## 2018-03-15 NOTE — PATIENT INSTRUCTIONS
Thank you for choosing Vito Montanez PA-C at [place] [address]  To Do: Arelia Severance Budny  1. Pt to begin medications as prescribed  2.  Follow-up in 2 weeks, sooner if problems    • Please signup for MY CHART, which is electronic access to your record testing, please call Central Scheduling at [phone]  Please allow our office 5 business days to contact you regarding any testing results.     Refill policies:   Allow 3 business days for refills; controlled substances may take longer and must be picked

## 2018-03-30 PROBLEM — F41.9 ANXIETY AND DEPRESSION: Status: ACTIVE | Noted: 2018-03-30

## 2018-03-30 PROBLEM — F32.A ANXIETY AND DEPRESSION: Status: ACTIVE | Noted: 2018-03-30

## 2018-03-30 NOTE — PATIENT INSTRUCTIONS
Thank you for choosing Martha Wu PA-C at [place] [address]  To Do: Shawanda Cooney  1. Pt to start taking 2 of the dose of effexor you have, and then begin taking new dose  2.  Follow-up in 1 month, sooner if problems    • Please signup for MY C that the insurance company approved your testing, please call Central Scheduling at [phone]  Please allow our office 5 business days to contact you regarding any testing results.     Refill policies:   Allow 3 business days for refills; controlled subs

## 2018-03-30 NOTE — PROGRESS NOTES
[facility] Internal Medicine Progress Note    CC:  Patient presents with:  Medication Follow-Up      HPI:   HPI  Pt states she is feeling a little better  She did not feel as anxious last week, this week was a little more stressful with work trent round, and reactive to light. Neck: No thyromegaly present. Cardiovascular: Normal rate, regular rhythm and normal heart sounds. Exam reveals no gallop and no friction rub. No murmur heard.   Pulmonary/Chest: Effort normal and breath sounds normal.

## 2018-04-19 ENCOUNTER — PATIENT MESSAGE (OUTPATIENT)
Dept: FAMILY MEDICINE CLINIC | Facility: CLINIC | Age: 38
End: 2018-04-19

## 2018-04-19 DIAGNOSIS — M25.521 RIGHT ELBOW PAIN: Primary | ICD-10-CM

## 2018-04-19 NOTE — TELEPHONE ENCOUNTER
From: Cherie Szymanski  To: Normasamantha Upton  Sent: 4/19/2018 12:04 PM CDT  Subject: Non-Urgent Medical Question    I have a doctor's appt next week but I wanted to know about the tennis elbow that I have from the thumb injury I had, should I be l

## 2018-04-20 RX ORDER — METHYLPREDNISOLONE 4 MG/1
TABLET ORAL
Qty: 1 KIT | Refills: 0 | Status: SHIPPED | OUTPATIENT
Start: 2018-04-20 | End: 2018-04-26 | Stop reason: ALTCHOICE

## 2018-04-20 NOTE — TELEPHONE ENCOUNTER
We could discuss the addition of maybe at steroid or topical medicine for the elbow. When she was doing PT, did they work on anything with the elbow?

## 2018-04-20 NOTE — TELEPHONE ENCOUNTER
Patient was already d/c from OT so they did not address elbow? Would you like to refer to PT or start with medication?

## 2018-04-26 NOTE — PROGRESS NOTES
[facility] Internal Medicine Progress Note    CC:  Patient presents with:  Medication Follow-Up  Anxiety      HPI:   HPI  Anxiety  Pt is doing well on Effexor 75mg  She states mood is good  She has lost 12lbs and has been exercising    Insomnia No murmur heard. Pulmonary/Chest: Effort normal and breath sounds normal. No respiratory distress. She has no wheezes. She has no rales. Musculoskeletal: She exhibits no edema or tenderness. Lymphadenopathy:     She has no cervical adenopathy.    Kevin

## 2018-04-26 NOTE — PATIENT INSTRUCTIONS
Thank you for choosing Jennifer Dinh PA-C at [place] [address]  To Do: Nena Cooney  1. Pt to continue medications as prescribed  2. Pt to wear tennis elbow brace  3.  Follow-up in 6 months, sooner if problems    • Please signup for MY CHART, cari insurance company approved your testing, please call Central Scheduling at [phone]  Please allow our office 5 business days to contact you regarding any testing results.     Refill policies:   Allow 3 business days for refills; controlled substances ma

## 2018-04-29 ENCOUNTER — PATIENT MESSAGE (OUTPATIENT)
Dept: FAMILY MEDICINE CLINIC | Facility: CLINIC | Age: 38
End: 2018-04-29

## 2018-04-30 RX ORDER — AMOXICILLIN AND CLAVULANATE POTASSIUM 875; 125 MG/1; MG/1
1 TABLET, FILM COATED ORAL 2 TIMES DAILY
Qty: 20 TABLET | Refills: 0 | Status: SHIPPED | OUTPATIENT
Start: 2018-04-30 | End: 2018-05-10

## 2018-04-30 RX ORDER — IPRATROPIUM BROMIDE 21 UG/1
2 SPRAY, METERED NASAL EVERY 12 HOURS
Qty: 1 BOTTLE | Refills: 0 | Status: SHIPPED | OUTPATIENT
Start: 2018-04-30 | End: 2018-06-05

## 2018-04-30 NOTE — TELEPHONE ENCOUNTER
I will send augmenting to pharmacy along with a nasal spray  She can take OTC Allegra in AM, OTC Zyrtec in PM

## 2018-04-30 NOTE — TELEPHONE ENCOUNTER
From: Jeromy [place]  To: Rico Bro  Sent: 4/29/2018 3:32 PM CDT  Subject: Non-Urgent Medical Question    Malena Reynolds     I just saw you last week to check my status of my meds and I had mentioned that I was congested and we thought maybe it w

## 2018-04-30 NOTE — TELEPHONE ENCOUNTER
Patient seen 4/26/18. I do not see symptoms discussed in OV notes but patient states she mentioned that she thought her allergies were causing but now believes it is a sinus infection due to green nasal discharges and pain in ears and sinuses.  Would you li

## 2018-06-05 ENCOUNTER — OFFICE VISIT (OUTPATIENT)
Dept: FAMILY MEDICINE CLINIC | Facility: CLINIC | Age: 38
End: 2018-06-05

## 2018-06-05 VITALS
SYSTOLIC BLOOD PRESSURE: 120 MMHG | RESPIRATION RATE: 16 BRPM | WEIGHT: 200 LBS | HEART RATE: 76 BPM | DIASTOLIC BLOOD PRESSURE: 80 MMHG | HEIGHT: 66 IN | BODY MASS INDEX: 32.14 KG/M2 | TEMPERATURE: 98 F

## 2018-06-05 DIAGNOSIS — M54.16 LUMBAR RADICULAR PAIN: Primary | ICD-10-CM

## 2018-06-05 PROCEDURE — 99213 OFFICE O/P EST LOW 20 MIN: CPT | Performed by: PHYSICIAN ASSISTANT

## 2018-06-05 RX ORDER — METHOCARBAMOL 750 MG/1
750 TABLET, FILM COATED ORAL 4 TIMES DAILY
Qty: 45 TABLET | Refills: 0 | Status: SHIPPED | OUTPATIENT
Start: 2018-06-05 | End: 2018-07-16

## 2018-06-05 RX ORDER — METHYLPREDNISOLONE 4 MG/1
TABLET ORAL
Qty: 1 KIT | Refills: 0 | Status: SHIPPED | OUTPATIENT
Start: 2018-06-05 | End: 2018-07-16

## 2018-06-05 RX ORDER — HYDROCODONE BITARTRATE AND ACETAMINOPHEN 5; 325 MG/1; MG/1
1 TABLET ORAL NIGHTLY PRN
Qty: 30 TABLET | Refills: 0 | Status: SHIPPED | OUTPATIENT
Start: 2018-06-05 | End: 2018-07-06

## 2018-06-05 NOTE — PATIENT INSTRUCTIONS
Thank you for choosing Chucky Habermann, PA-C at [place] [address]  To Do: Dana Cooney  1. Begin medications as prescribed  2. Heat to area  3. Start exercises  4.  Follow-up if symptoms or increase    • Please signup for MY CHART, which is electron company approved your testing, please call Central Scheduling at [phone]  Please allow our office 5 business days to contact you regarding any testing results.     Refill policies:   Allow 3 business days for refills; controlled substances may take sae

## 2018-06-05 NOTE — PROGRESS NOTES
[facility] Internal Medicine Progress Note    CC:  Patient presents with:  Low Back Pain: radiates into R side buttocks into R leg x 5 days      HPI:   HPI  Low Back Pain  Low back pain, R side buttocks into R leg x 5 days  She has had this pain Cardiovascular: Normal rate, regular rhythm and normal heart sounds. Exam reveals no gallop and no friction rub. No murmur heard. Pulmonary/Chest: Effort normal and breath sounds normal. No respiratory distress. She has no wheezes. She has no rales. Numbness and tingling of hand     Heaviness of upper extremity     Umbilical hernia     Primary insomnia     Anxiety and depression

## 2018-06-11 ENCOUNTER — PATIENT MESSAGE (OUTPATIENT)
Dept: FAMILY MEDICINE CLINIC | Facility: CLINIC | Age: 38
End: 2018-06-11

## 2018-06-11 DIAGNOSIS — M54.50 LOW BACK PAIN WITHOUT SCIATICA, UNSPECIFIED BACK PAIN LATERALITY, UNSPECIFIED CHRONICITY: Primary | ICD-10-CM

## 2018-06-11 NOTE — TELEPHONE ENCOUNTER
See patients message below    LOV 6/5/18   Low Back Pain  Low back pain, R side buttocks into R leg x 5 days  She has had this pain before  She can't remember anything out of the ordinary that she did  Started to hurt on Thursday  Hurts on the R side  Some

## 2018-06-11 NOTE — TELEPHONE ENCOUNTER
From: Violetta Conti  To: Ronen Ramos  Sent: 6/11/2018 1:18 PM CDT  Subject: Visit Follow-up Question    I have taken all of the prednisone for my lower back pain and haven't had any relief.  Today my back is actually feeling worse at the ta

## 2018-06-29 ENCOUNTER — PATIENT MESSAGE (OUTPATIENT)
Dept: FAMILY MEDICINE CLINIC | Facility: CLINIC | Age: 38
End: 2018-06-29

## 2018-06-29 NOTE — TELEPHONE ENCOUNTER
From: Sarah Meléndez  To: Robert Howard  Sent: 6/29/2018 11:47 AM CDT  Subject: Non-Urgent Medical Question    I am going to be starting a new job in August and it requires me to get a new physical done within 6 months and the last one I had

## 2018-07-05 ENCOUNTER — HOSPITAL ENCOUNTER (OUTPATIENT)
Dept: MRI IMAGING | Age: 38
Discharge: HOME | End: 2018-07-05
Attending: PHYSICIAN ASSISTANT
Payer: COMMERCIAL

## 2018-07-05 DIAGNOSIS — M54.50 LOW BACK PAIN WITHOUT SCIATICA, UNSPECIFIED BACK PAIN LATERALITY, UNSPECIFIED CHRONICITY: ICD-10-CM

## 2018-07-05 PROCEDURE — 72148 MRI LUMBAR SPINE W/O DYE: CPT | Performed by: PHYSICIAN ASSISTANT

## 2018-07-06 RX ORDER — HYDROCODONE BITARTRATE AND ACETAMINOPHEN 5; 325 MG/1; MG/1
1 TABLET ORAL NIGHTLY PRN
Qty: 30 TABLET | Refills: 0 | Status: SHIPPED | OUTPATIENT
Start: 2018-07-06 | End: 2018-08-22

## 2018-07-06 NOTE — TELEPHONE ENCOUNTER
Spoke with patient regarding norco script approval, advised patient script is ready for  and that we are only in the office until noon. Pt states she will be in before noon. Script placed at the  in [facility] patient  drawer.

## 2018-07-06 NOTE — TELEPHONE ENCOUNTER
Patient has bulging lumbar discs and continued pain. Referred to neurosurgeon today. She is out of Norco given and asked for a refill.   I spoke with Martir Wisdom who is out of the office and asked me to forward message to Dr. Kaitlynn Wang to see if he will approve

## 2018-07-09 ENCOUNTER — TELEPHONE (OUTPATIENT)
Dept: FAMILY MEDICINE CLINIC | Facility: CLINIC | Age: 38
End: 2018-07-09

## 2018-07-09 NOTE — TELEPHONE ENCOUNTER
PA for hydrocodone 5/325mg has been approved     As long as you remain covered by your prescription drug plan and there are no  changes to your plan benefits, this request is approved for the following time period:  07/09/2018 – 07/09/2019

## 2018-07-16 ENCOUNTER — OFFICE VISIT (OUTPATIENT)
Dept: FAMILY MEDICINE CLINIC | Facility: CLINIC | Age: 38
End: 2018-07-16

## 2018-07-16 VITALS
DIASTOLIC BLOOD PRESSURE: 76 MMHG | WEIGHT: 196 LBS | BODY MASS INDEX: 31.5 KG/M2 | HEART RATE: 82 BPM | SYSTOLIC BLOOD PRESSURE: 118 MMHG | RESPIRATION RATE: 16 BRPM | HEIGHT: 66 IN

## 2018-07-16 DIAGNOSIS — Z00.00 WELLNESS EXAMINATION: Primary | ICD-10-CM

## 2018-07-16 DIAGNOSIS — F32.A ANXIETY AND DEPRESSION: ICD-10-CM

## 2018-07-16 DIAGNOSIS — F51.01 PRIMARY INSOMNIA: ICD-10-CM

## 2018-07-16 DIAGNOSIS — Z00.00 LABORATORY EXAMINATION ORDERED AS PART OF A ROUTINE GENERAL MEDICAL EXAMINATION: ICD-10-CM

## 2018-07-16 DIAGNOSIS — F41.9 ANXIETY AND DEPRESSION: ICD-10-CM

## 2018-07-16 DIAGNOSIS — M54.16 LUMBAR RADICULOPATHY: ICD-10-CM

## 2018-07-16 PROCEDURE — 99395 PREV VISIT EST AGE 18-39: CPT | Performed by: PHYSICIAN ASSISTANT

## 2018-07-16 NOTE — PROGRESS NOTES
REASON FOR VISIT:    [place] is a 45year old female who presents for an [address]. Anxiety  Pt is doing well on Effexor and Clonazepam  Mood is good    Pt has lost 30lbs and she is feeling good!   She hasn't been exercising too m annually Body mass index is 31.64 kg/m².      Preventive Services for Which Recommendations Vary with Risk Recommendation Internal Lab or Procedure   Cholesterol Screening Recommended screening varies with age, risk and gender LDL CHOLESTROL (mg/dL)   Date Cystoscopy and Stent Removal  No date: TONSILLECTOMY  No date: TUBAL LIGATION   Family History   Problem Relation Age of Onset   • Diabetes Mother       SOCIAL HISTORY:   Smoking status: Never Smoker who presents for an [address].      PLAN SUMMARY:   Diagnoses and all orders for this visit:    Wellness examination  PHQ-2 score 0  CAGE score 0  Up to date on cervical cancer screening  Up to date on immunizations, will need influenza this

## 2018-07-16 NOTE — PATIENT INSTRUCTIONS
Thank you for choosing Arabella Fox PA-C at [facility]  To Do: Merlyn Cooney  1. Pt to continue medications  2. Get lab work done  3.  Follow-up around 6 months, sooner if problems    • Please signup for MY CHART, which is electronic acces approved your testing, please call Central Scheduling at [phone]  Please allow our office 5 business days to contact you regarding any testing results.     Refill policies:   Allow 3 business days for refills; controlled substances may take longer and

## 2018-07-17 LAB
ABSOLUTE BASOPHILS: 28 CELLS/UL (ref 0–200)
ABSOLUTE EOSINOPHILS: 41 CELLS/UL (ref 15–500)
ABSOLUTE LYMPHOCYTES: 2381 CELLS/UL (ref 850–3900)
ABSOLUTE MONOCYTES: 400 CELLS/UL (ref 200–950)
ABSOLUTE NEUTROPHILS: 4050 CELLS/UL (ref 1500–7800)
ALBUMIN/GLOBULIN RATIO: 1.8 (CALC) (ref 1–2.5)
ALBUMIN: 4.6 G/DL (ref 3.6–5.1)
ALKALINE PHOSPHATASE: 83 U/L (ref 33–115)
ALT: 14 U/L (ref 6–29)
AST: 13 U/L (ref 10–30)
BASOPHILS: 0.4 %
BILIRUBIN, TOTAL: 0.5 MG/DL (ref 0.2–1.2)
BUN: 15 MG/DL (ref 7–25)
CALCIUM: 9.9 MG/DL (ref 8.6–10.2)
CARBON DIOXIDE: 30 MMOL/L (ref 20–31)
CHLORIDE: 103 MMOL/L (ref 98–110)
CHOL/HDLC RATIO: 3.9 (CALC)
CHOLESTEROL, TOTAL: 166 MG/DL
CREATININE: 0.93 MG/DL (ref 0.5–1.1)
EGFR IF AFRICN AM: 90 ML/MIN/1.73M2
EGFR IF NONAFRICN AM: 78 ML/MIN/1.73M2
EOSINOPHILS: 0.6 %
GLOBULIN: 2.6 G/DL (CALC) (ref 1.9–3.7)
GLUCOSE: 87 MG/DL (ref 65–99)
HDL CHOLESTEROL: 43 MG/DL
HEMATOCRIT: 41.3 % (ref 35–45)
HEMOGLOBIN: 13.6 G/DL (ref 11.7–15.5)
LDL-CHOLESTEROL: 100 MG/DL (CALC)
LYMPHOCYTES: 34.5 %
MCH: 28.5 PG (ref 27–33)
MCHC: 32.9 G/DL (ref 32–36)
MCV: 86.6 FL (ref 80–100)
MONOCYTES: 5.8 %
MPV: 11.1 FL (ref 7.5–12.5)
NEUTROPHILS: 58.7 %
NON-HDL CHOLESTEROL: 123 MG/DL (CALC)
PLATELET COUNT: 311 THOUSAND/UL (ref 140–400)
POTASSIUM: 5.1 MMOL/L (ref 3.5–5.3)
PROTEIN, TOTAL: 7.2 G/DL (ref 6.1–8.1)
RDW: 12.3 % (ref 11–15)
RED BLOOD CELL COUNT: 4.77 MILLION/UL (ref 3.8–5.1)
SODIUM: 138 MMOL/L (ref 135–146)
T4, FREE: 1 NG/DL (ref 0.8–1.8)
TRIGLYCERIDES: 124 MG/DL
TSH: 1.66 MIU/L
WHITE BLOOD CELL COUNT: 6.9 THOUSAND/UL (ref 3.8–10.8)

## 2018-07-23 ENCOUNTER — OFFICE VISIT (OUTPATIENT)
Dept: SURGERY | Facility: CLINIC | Age: 38
End: 2018-07-23
Payer: COMMERCIAL

## 2018-07-23 VITALS — HEART RATE: 80 BPM | SYSTOLIC BLOOD PRESSURE: 120 MMHG | DIASTOLIC BLOOD PRESSURE: 70 MMHG

## 2018-07-23 DIAGNOSIS — M53.3 SACROILIAC JOINT DYSFUNCTION OF RIGHT SIDE: Primary | ICD-10-CM

## 2018-07-23 PROCEDURE — 99215 OFFICE O/P EST HI 40 MIN: CPT | Performed by: PHYSICIAN ASSISTANT

## 2018-07-23 RX ORDER — DICLOFENAC SODIUM 75 MG/1
75 TABLET, DELAYED RELEASE ORAL 2 TIMES DAILY
Qty: 60 TABLET | Refills: 0 | Status: SHIPPED | OUTPATIENT
Start: 2018-07-23 | End: 2018-08-22

## 2018-07-23 NOTE — H&P
Neurosurgery Clinic Visit  2018    Dontae Ulrich PCP:  Jomar Lopez MD    1980 MRN [id number]       CC:  Right back pain, right leg tingling    HPI:    Poli Zaragoza is a very pleasant 45year old female who presents with recurrent right sided Number of children:               Social History Main Topics    Smoking status: Never Smoker                                                                Smokeless tobacco: Never Used                        Alcohol use:  No              Drug us 2+ No No     A/P:    1. Sacroiliac joint dysfunction of right side        MRI findings are minimal and incidental.  No surgical recommendations at this time. Pain appears to be from SI joint.   Recommend pain eval for Right SI injection as diagnostic and t

## 2018-07-23 NOTE — PROGRESS NOTES
Location of Pain: lower back radiating into the right buttock.   Numbness and tingling radiating down the right leg, with numbness and tingling in toes    Date Pain Began: yrs worsening 3 mos          Work Related:   No        Receiving Work Comp/Disability

## 2018-07-23 NOTE — PATIENT INSTRUCTIONS
Refill policies:    • Allow 2-3 business days for refills; controlled substances may take longer.   • Contact your pharmacy at least 5 days prior to running out of medication and have them send an electronic request or submit request through the “request re entire amount billed. Precertification and Prior Authorizations: If your physician has recommended that you have a procedure or additional testing performed.   [facility]) will contact your insurance carrier to obtain pre-certi

## 2018-08-01 ENCOUNTER — OFFICE VISIT (OUTPATIENT)
Dept: PAIN CLINIC | Facility: CLINIC | Age: 38
End: 2018-08-01
Payer: COMMERCIAL

## 2018-08-01 VITALS
DIASTOLIC BLOOD PRESSURE: 88 MMHG | BODY MASS INDEX: 31.66 KG/M2 | HEIGHT: 66 IN | WEIGHT: 197 LBS | SYSTOLIC BLOOD PRESSURE: 120 MMHG | HEART RATE: 78 BPM

## 2018-08-01 DIAGNOSIS — M46.1 SACROILIITIS (HCC): Primary | ICD-10-CM

## 2018-08-01 PROCEDURE — 99204 OFFICE O/P NEW MOD 45 MIN: CPT | Performed by: ANESTHESIOLOGY

## 2018-08-01 NOTE — PROGRESS NOTES
HPI    Review of Systems    Physical Exam   Constitutional:             #[id number]    Location of Pain: Lower right back/hip    Date Pain Began: 1 year          Work Related:   No        Receiving Work Comp/Disability:   No    Numeric Rating Scale:  Pain at

## 2018-08-01 NOTE — PATIENT INSTRUCTIONS
Refill policies:    • Allow 2-3 business days for refills; controlled substances may take longer.   • Contact your pharmacy at least 5 days prior to running out of medication and have them send an electronic request or submit request through the “request re entire amount billed. Precertification and Prior Authorizations: If your physician has recommended that you have a procedure or additional testing performed.   [facility]) will contact your insurance carrier to obtain pre-certi procedure if you are experiencing any symptoms of infection such as cough, fever, chills, urinary symptoms, or have recently been prescribed antibiotics, have open wounds, have recently had surgery or dental procedures.      As you will be unable to shower days  • Eliquis (Apixaban) 3 days  • Xarelto (Rivaroxaban) 3 days  • Lovenox (Enoxaparin) 24 hours  • Aspirin  • 81mg 24 hours  • Greater than 81 mg (325mg) 7 days  • Coumadin       5 days  • Procedure may be cancelled if INR is elevated.    • Excedrin (wit CANCELLATION AND/OR RESCHEDULING: PLEASE CALL [facility] PRE-PROCEDURE LINE -[phone] FOR DETAILED INSTRUCTIONS FIVE TO SEVEN DAYS PRIOR TO PROCEDURE**

## 2018-08-03 ENCOUNTER — TELEPHONE (OUTPATIENT)
Dept: SURGERY | Facility: CLINIC | Age: 38
End: 2018-08-03

## 2018-08-03 DIAGNOSIS — M47.819 SPONDYLOSIS WITHOUT MYELOPATHY: Primary | ICD-10-CM

## 2018-08-03 DIAGNOSIS — M46.1 SACROILIITIS (HCC): ICD-10-CM

## 2018-08-05 NOTE — PROGRESS NOTES
Name: Robert Broussard   : 1980   DOS: 2018     Chief complaint: Low back pain    History of present illness:  Robert Broussard is a 45year old female complaining of  pain in the lower back for 1 year on and off but last 4 month it is constant TUBAL LIGATION   Family History   Problem Relation Age of Onset   • Diabetes Mother      Smoking status: Never Smoker                                                              Smokeless tobacco: Never Used                      Alcohol use:  No maneuver is normal. Radial pulsation is palpable bilaterally. Phalen’s and Tinnel’s sign is negative. Lhermitte’s test is negative.     Motor Examination:    (R)   (L)  Deltoid:      5    5  Biceps:       5    5  Triceps:      5    5  Wrist Extension:     5 N                          N   [place]:    N    N   Ankle Clonus:    N                          N  Sensory Examination:    (R)   (L)   LI:      N                          N   L2:      N     N   L3:      N               N   L4:      N

## 2018-08-10 ENCOUNTER — TELEPHONE (OUTPATIENT)
Dept: SURGERY | Facility: CLINIC | Age: 38
End: 2018-08-10

## 2018-08-10 NOTE — TELEPHONE ENCOUNTER
Spoke with Melany Ferrell from [employer]-  [id number] has been approved  Cert # [id number]  1/4/02-9/94/28  Call ref same as cert #  Call time - 98:16

## 2018-08-16 ENCOUNTER — APPOINTMENT (OUTPATIENT)
Dept: GENERAL RADIOLOGY | Facility: HOSPITAL | Age: 38
End: 2018-08-16
Attending: ANESTHESIOLOGY
Payer: COMMERCIAL

## 2018-08-16 ENCOUNTER — HOSPITAL ENCOUNTER (OUTPATIENT)
Facility: HOSPITAL | Age: 38
Setting detail: HOSPITAL OUTPATIENT SURGERY
Discharge: HOME | End: 2018-08-16
Attending: ANESTHESIOLOGY | Admitting: ANESTHESIOLOGY
Payer: COMMERCIAL

## 2018-08-16 ENCOUNTER — SURGERY (OUTPATIENT)
Age: 38
End: 2018-08-16

## 2018-08-16 VITALS
OXYGEN SATURATION: 98 % | SYSTOLIC BLOOD PRESSURE: 116 MMHG | TEMPERATURE: 98 F | RESPIRATION RATE: 16 BRPM | DIASTOLIC BLOOD PRESSURE: 76 MMHG | HEART RATE: 68 BPM

## 2018-08-16 DIAGNOSIS — M47.819 SPONDYLOSIS WITHOUT MYELOPATHY: ICD-10-CM

## 2018-08-16 DIAGNOSIS — M46.1 SACROILIITIS (HCC): ICD-10-CM

## 2018-08-16 LAB
POCT LOT NUMBER: NORMAL
POCT URINE PREGNANCY: NEGATIVE

## 2018-08-16 PROCEDURE — 3E0U33Z INTRODUCTION OF ANTI-INFLAMMATORY INTO JOINTS, PERCUTANEOUS APPROACH: ICD-10-PCS | Performed by: ANESTHESIOLOGY

## 2018-08-16 PROCEDURE — 81025 URINE PREGNANCY TEST: CPT | Performed by: ANESTHESIOLOGY

## 2018-08-16 PROCEDURE — 99152 MOD SED SAME PHYS/QHP 5/>YRS: CPT | Performed by: ANESTHESIOLOGY

## 2018-08-16 RX ORDER — SODIUM CHLORIDE, SODIUM LACTATE, POTASSIUM CHLORIDE, CALCIUM CHLORIDE 600; 310; 30; 20 MG/100ML; MG/100ML; MG/100ML; MG/100ML
100 INJECTION, SOLUTION INTRAVENOUS CONTINUOUS
Status: DISCONTINUED | OUTPATIENT
Start: 2018-08-16 | End: 2018-08-16

## 2018-08-16 RX ORDER — ONDANSETRON 2 MG/ML
4 INJECTION INTRAMUSCULAR; INTRAVENOUS ONCE AS NEEDED
Status: COMPLETED | OUTPATIENT
Start: 2018-08-16 | End: 2018-08-16

## 2018-08-16 RX ORDER — ONDANSETRON 2 MG/ML
INJECTION INTRAMUSCULAR; INTRAVENOUS
Status: COMPLETED
Start: 2018-08-16 | End: 2018-08-16

## 2018-08-16 RX ORDER — LIDOCAINE HYDROCHLORIDE 10 MG/ML
INJECTION, SOLUTION EPIDURAL; INFILTRATION; INTRACAUDAL; PERINEURAL AS NEEDED
Status: DISCONTINUED | OUTPATIENT
Start: 2018-08-16 | End: 2018-08-16 | Stop reason: HOSPADM

## 2018-08-16 RX ORDER — METHYLPREDNISOLONE ACETATE 40 MG/ML
INJECTION, SUSPENSION INTRA-ARTICULAR; INTRALESIONAL; INTRAMUSCULAR; SOFT TISSUE AS NEEDED
Status: DISCONTINUED | OUTPATIENT
Start: 2018-08-16 | End: 2018-08-16 | Stop reason: HOSPADM

## 2018-08-16 RX ORDER — DIPHENHYDRAMINE HYDROCHLORIDE 50 MG/ML
50 INJECTION INTRAMUSCULAR; INTRAVENOUS ONCE AS NEEDED
Status: DISCONTINUED | OUTPATIENT
Start: 2018-08-16 | End: 2018-08-16

## 2018-08-16 RX ORDER — ONDANSETRON 2 MG/ML
4 INJECTION INTRAMUSCULAR; INTRAVENOUS ONCE AS NEEDED
Status: DISCONTINUED | OUTPATIENT
Start: 2018-08-16 | End: 2018-08-16

## 2018-08-16 RX ORDER — MIDAZOLAM HYDROCHLORIDE 1 MG/ML
INJECTION INTRAMUSCULAR; INTRAVENOUS AS NEEDED
Status: DISCONTINUED | OUTPATIENT
Start: 2018-08-16 | End: 2018-08-16 | Stop reason: HOSPADM

## 2018-08-16 NOTE — OR NURSING
IV REMOVED WITH CANNULA INTACT. DENIES NUMBNESS OR WEAKNESS IN LEGS. AMBULATING IN ROOM WITHOUT ASSISTANCE. INSTRUCTIONS GIVEN WITH QUESTIONS ADDRESSED.

## 2018-08-16 NOTE — OPERATIVE REPORT
[facility]  Operative Report  2018     [address] [facility] Patient Status:  Hospital Outpatient Surgery    1980 MRN [id number]   Location [address] Attending Crystal Ahn MD   Hosp Day # 0 PCP Cristopher Nguyen MD     Indicat Quincke spinal needle was advanced into the middle portion of the corresponding sacroiliac joint. After the needle  positions were confirmed by AP and lateral views of fluoroscopy, 1 cc Omnipaque-240 was injected into the sacroiliac joint.  There was a nice

## 2018-08-20 ENCOUNTER — PATIENT MESSAGE (OUTPATIENT)
Dept: FAMILY MEDICINE CLINIC | Facility: CLINIC | Age: 38
End: 2018-08-20

## 2018-08-20 NOTE — TELEPHONE ENCOUNTER
From: Cortez Rodarte  To: Jammie Yoon  Sent: 8/20/2018 4:41 PM CDT  Subject: Non-Urgent Medical Question    I have had a dull ache in my lower left abdominal area (feels more like where ovaries could be) for a week or so and a few weeks kedar

## 2018-08-20 NOTE — TELEPHONE ENCOUNTER
From: Jody Redd  To: Sincere Cook  Sent: 8/20/2018 4:37 PM CDT  Subject: Prescription Question    So for the past few weeks the medication that I regularly take to help my anxiety at night time hasn't been working at night time to help

## 2018-08-22 ENCOUNTER — OFFICE VISIT (OUTPATIENT)
Dept: FAMILY MEDICINE CLINIC | Facility: CLINIC | Age: 38
End: 2018-08-22
Payer: COMMERCIAL

## 2018-08-22 VITALS
HEIGHT: 66 IN | RESPIRATION RATE: 20 BRPM | SYSTOLIC BLOOD PRESSURE: 100 MMHG | HEART RATE: 76 BPM | BODY MASS INDEX: 31.18 KG/M2 | WEIGHT: 194 LBS | DIASTOLIC BLOOD PRESSURE: 72 MMHG | TEMPERATURE: 99 F

## 2018-08-22 DIAGNOSIS — F32.A ANXIETY AND DEPRESSION: ICD-10-CM

## 2018-08-22 DIAGNOSIS — Z87.442 HISTORY OF KIDNEY STONES: ICD-10-CM

## 2018-08-22 DIAGNOSIS — R10.32 LLQ PAIN: Primary | ICD-10-CM

## 2018-08-22 DIAGNOSIS — F41.9 ANXIETY AND DEPRESSION: ICD-10-CM

## 2018-08-22 DIAGNOSIS — F51.01 PRIMARY INSOMNIA: ICD-10-CM

## 2018-08-22 PROCEDURE — 99214 OFFICE O/P EST MOD 30 MIN: CPT | Performed by: PHYSICIAN ASSISTANT

## 2018-08-22 RX ORDER — TRAZODONE HYDROCHLORIDE 50 MG/1
50 TABLET ORAL NIGHTLY
Qty: 90 TABLET | Refills: 1 | Status: SHIPPED | OUTPATIENT
Start: 2018-08-22 | End: 2018-10-17

## 2018-08-22 RX ORDER — VENLAFAXINE HYDROCHLORIDE 150 MG/1
150 CAPSULE, EXTENDED RELEASE ORAL DAILY
Qty: 90 CAPSULE | Refills: 1 | Status: SHIPPED | OUTPATIENT
Start: 2018-08-22 | End: 2018-09-05

## 2018-08-22 NOTE — PATIENT INSTRUCTIONS
Thank you for choosing Castro Mckeon PA-C at [place] [address]  To Do: Imtiaz Cooney  1. Begin increased dose of effexor  2. Start trazodone  3. Get imaging done  4.  Follow-up as scheduled, sooner if problems    • Please signup for MY CHART, trent insurance company approved your testing, please call Central Scheduling at [phone]  Please allow our office 5 business days to contact you regarding any testing results.     Refill policies:   Allow 3 business days for refills; controlled substances ma

## 2018-08-22 NOTE — PROGRESS NOTES
[facility] Internal Medicine Progress Note    CC:  Patient presents with:  Abdominal Pain: lower left abdominal pain. on and off for 6 weeks pt gets this pain very sharp and also wants to talk about medications.       HPI:   HPI  Abdominal Pain Position: Sitting, Cuff Size: adult)   Pulse 76   Temp 98.8 °F (37.1 °C) (Oral)   Resp 20   Ht 66\"   Wt 194 lb   BMI 31.31 kg/m²  Body mass index is 31.31 kg/m².   Physical Exam   Constitutional: She is oriented to person, place, and time and well-develope nightly. Imaging & Consults:  US KIDNEY/BLADDER (CPT=76770)  US PELVIS (TRANSVAGINAL PELVIS) (MAC=85487)     Patient/Caregiver Education: Patient/Caregiver Education: There are no barriers to learning. Medical education done.  Outcome: Patient ayush

## 2018-08-28 ENCOUNTER — HOSPITAL ENCOUNTER (OUTPATIENT)
Dept: ULTRASOUND IMAGING | Age: 38
Discharge: HOME | End: 2018-08-28
Attending: PHYSICIAN ASSISTANT
Payer: COMMERCIAL

## 2018-08-28 DIAGNOSIS — R10.32 LLQ PAIN: ICD-10-CM

## 2018-08-28 DIAGNOSIS — Z87.442 HISTORY OF KIDNEY STONES: ICD-10-CM

## 2018-08-28 PROCEDURE — 76830 TRANSVAGINAL US NON-OB: CPT | Performed by: PHYSICIAN ASSISTANT

## 2018-08-28 PROCEDURE — 76856 US EXAM PELVIC COMPLETE: CPT | Performed by: PHYSICIAN ASSISTANT

## 2018-08-28 PROCEDURE — 76770 US EXAM ABDO BACK WALL COMP: CPT | Performed by: PHYSICIAN ASSISTANT

## 2018-08-30 NOTE — PROGRESS NOTES
There is some thinning of tissue within the kidney, I would recommend following up with nephrology, Dr. Cueva Arthur

## 2018-08-30 NOTE — PROGRESS NOTES
Endometrium is mildly thickened  There is a complex cyst R ovary  Given pt's recent symptoms with spotting, I would recommend pt to follow-up with gyne, if she does not have one, Dr. Streeter Person office

## 2018-09-05 ENCOUNTER — OFFICE VISIT (OUTPATIENT)
Dept: PAIN CLINIC | Facility: CLINIC | Age: 38
End: 2018-09-05
Payer: COMMERCIAL

## 2018-09-05 VITALS
BODY MASS INDEX: 31.18 KG/M2 | SYSTOLIC BLOOD PRESSURE: 112 MMHG | OXYGEN SATURATION: 98 % | HEIGHT: 66 IN | DIASTOLIC BLOOD PRESSURE: 80 MMHG | HEART RATE: 90 BPM | WEIGHT: 194 LBS

## 2018-09-05 DIAGNOSIS — M47.816 LUMBAR FACET ARTHROPATHY: Primary | ICD-10-CM

## 2018-09-05 PROCEDURE — 99214 OFFICE O/P EST MOD 30 MIN: CPT | Performed by: ANESTHESIOLOGY

## 2018-09-09 NOTE — PROGRESS NOTES
Name: Alessandro Mejia   : 1980   DOS: 2018     Pain Clinic Follow Up Visit:   Alessandro Mejia is a 45year old female who presents for recheck of her chronic low back pain. She is status post bilateral sacroiliac joint injection.  She has exc were discussed with the patient. The patient wanted to proceed with the procedure. I recommended the patient to be seen by a urologist first before I do the injection. If kidneys causing the pain she will not need the injection.     Orders:No orders of t

## 2018-09-14 ENCOUNTER — OFFICE VISIT (OUTPATIENT)
Dept: OBGYN CLINIC | Facility: CLINIC | Age: 38
End: 2018-09-14
Payer: COMMERCIAL

## 2018-09-14 VITALS
BODY MASS INDEX: 31.02 KG/M2 | WEIGHT: 193 LBS | SYSTOLIC BLOOD PRESSURE: 120 MMHG | HEIGHT: 66 IN | HEART RATE: 77 BPM | DIASTOLIC BLOOD PRESSURE: 70 MMHG | RESPIRATION RATE: 16 BRPM

## 2018-09-14 DIAGNOSIS — N83.209 HEMORRHAGIC CYST OF OVARY: Primary | ICD-10-CM

## 2018-09-14 PROCEDURE — 99203 OFFICE O/P NEW LOW 30 MIN: CPT | Performed by: OBSTETRICS & GYNECOLOGY

## 2018-09-14 RX ORDER — VENLAFAXINE HYDROCHLORIDE 150 MG/1
150 CAPSULE, EXTENDED RELEASE ORAL DAILY
COMMUNITY
End: 2019-03-06

## 2018-09-14 NOTE — PROGRESS NOTES
CC: Patient presents with: Other: cyst, was referred by Valdo AVILA      HPI: Alessandro Mejia is a 45year old  here for consult regarding 2.1 cm left ovarian complex cyst. She had the US due to LLQ pain she was addressing with her PCP. Cystoscopy and Stent Removal  8/16/2018: SACROILIAC JOINT INJECTION RIGHT OR LEFT; Right      Comment:  Performed by Luís Lopez MD at [facility] MAIN OR  No date: TONSILLECTOMY  No date: TUBAL LIGATION  No Known Allergies    Current Outpatient Medications o Pulmonary/Chest: Effort normal and breath sounds normal.   Abdominal: Soft. She exhibits no distension. There is no tenderness. Neurological: She is alert and oriented to person, place, and time. Skin: Skin is warm and dry.    Psychiatric: She has a n

## 2018-09-14 NOTE — ASSESSMENT & PLAN NOTE
2.1 cm on left ovary. Pt had severe sudden pain last weekend that lasted for few hours. We discussed possibility of hemorrhagic cyst rupturing at that time and spilling of blood that irritated her peritoneum and caused her the sudden onset pain.  Patient ha

## 2018-09-27 ENCOUNTER — OFFICE VISIT (OUTPATIENT)
Dept: NEPHROLOGY | Facility: CLINIC | Age: 38
End: 2018-09-27
Payer: COMMERCIAL

## 2018-09-27 VITALS — SYSTOLIC BLOOD PRESSURE: 116 MMHG | WEIGHT: 195 LBS | BODY MASS INDEX: 31 KG/M2 | DIASTOLIC BLOOD PRESSURE: 74 MMHG

## 2018-09-27 DIAGNOSIS — N20.0 NEPHROLITHIASIS: Primary | ICD-10-CM

## 2018-09-27 PROCEDURE — 99205 OFFICE O/P NEW HI 60 MIN: CPT | Performed by: INTERNAL MEDICINE

## 2018-09-27 NOTE — PROGRESS NOTES
Nephrology Consult Note      REASON FOR CONSULT:  Nephrolithasis/Abnormal renal US         HPI:   Mandi Campos is a 45year old female with Patient presents with:  Renal Cyst    Shazia Adams MD    44 y/o female with hx of kidney stones, obesity, chr children: Not on file      Years of education: Not on file      Highest education level: Not on file    Social Needs      Financial resource strain: Not on file      Food insecurity - worry: Not on file      Food insecurity - inability: Not on file      Tr all extremities  Skin: Warm and dry, no rashes      Labs/US reviewed     ASSESSMENT/PLAN:   Richrad Wall was seen today for renal cyst.    Diagnoses and all orders for this visit:    Nephrolithiasis  -     SUPERSATURATION PROFILE UR; Future  -     Briana Cooler

## 2018-10-01 NOTE — PATIENT INSTRUCTIONS
Thank you for choosing Rachna Cai PA-C at [place] [address]  To Do: Pato Cooney  1. Stop trazodone, begin temazepam  2.  Follow-up around 3 months, sooner if problems    • Please signup for [employer] CHART, which is electronic access to your record testing, please call Central Scheduling at [phone]  Please allow our office 5 business days to contact you regarding any testing results.     Refill policies:   Allow 3 business days for refills; controlled substances may take longer and must be picked

## 2018-10-01 NOTE — PROGRESS NOTES
[facility] Internal Medicine Progress Note    CC:  Patient presents with:  Night Sweats: x 5-6 weeks - almost every night pt states she wakes up sweaty, feels like a fever broke  Imm/Inj: flu shot today      HPI:   HPI  Night sweats x 5-6 weeks oropharyngeal exudate. Eyes: EOM are normal. Pupils are equal, round, and reactive to light. Neck: No thyromegaly present. Cardiovascular: Normal rate, regular rhythm and normal heart sounds. Exam reveals no gallop and no friction rub.    No murmur he ovary present and normal in 4 extremities

## 2018-10-16 ENCOUNTER — PATIENT MESSAGE (OUTPATIENT)
Dept: FAMILY MEDICINE CLINIC | Facility: CLINIC | Age: 38
End: 2018-10-16

## 2018-10-16 NOTE — PROGRESS NOTES
Advised patient to call and schedule an appt  Future Appointments   Date Time Provider Emma Plummer   1/3/2019  9:15 AM Willie TORREZ PA-C [facility] [address] [facility] [address]

## 2018-10-16 NOTE — TELEPHONE ENCOUNTER
From: Angelique Alert  To: Daniel Prajapati  Sent: 10/16/2018 1:34 PM CDT  Subject: Wendie Solis,  I noticed that my neck had been getting very stiff. Exercises and stretching doesn't seem to help. I have applied heat and such.  Yesterday I not

## 2018-10-17 ENCOUNTER — OFFICE VISIT (OUTPATIENT)
Dept: FAMILY MEDICINE CLINIC | Facility: CLINIC | Age: 38
End: 2018-10-17
Payer: COMMERCIAL

## 2018-10-17 VITALS
HEART RATE: 80 BPM | TEMPERATURE: 98 F | SYSTOLIC BLOOD PRESSURE: 120 MMHG | BODY MASS INDEX: 32 KG/M2 | HEIGHT: 66 IN | RESPIRATION RATE: 16 BRPM | DIASTOLIC BLOOD PRESSURE: 70 MMHG

## 2018-10-17 DIAGNOSIS — M54.2 NECK PAIN: Primary | ICD-10-CM

## 2018-10-17 DIAGNOSIS — F51.01 PRIMARY INSOMNIA: ICD-10-CM

## 2018-10-17 DIAGNOSIS — R22.1 LOCALIZED SWELLING, MASS AND LUMP, NECK: ICD-10-CM

## 2018-10-17 PROCEDURE — 99214 OFFICE O/P EST MOD 30 MIN: CPT | Performed by: PHYSICIAN ASSISTANT

## 2018-10-17 RX ORDER — METHOCARBAMOL 750 MG/1
750 TABLET, FILM COATED ORAL 3 TIMES DAILY PRN
Qty: 60 TABLET | Refills: 0 | Status: SHIPPED | OUTPATIENT
Start: 2018-10-17 | End: 2018-12-17

## 2018-10-17 NOTE — PROGRESS NOTES
[address] [facility] Internal Medicine Progress Note    CC:  Patient presents with:  Bump: noticed a bump in the back of her head x 4 days - causing neck pain      HPI:   HPI  Bump  She has had neck pain for awhile  On Sunday she noticed a bump  When she l Neck: No thyromegaly present. Cardiovascular: Normal rate, regular rhythm and normal heart sounds. Exam reveals no gallop and no friction rub. No murmur heard. Pulmonary/Chest: Effort normal and breath sounds normal. No respiratory distress.  She has n Rosacea     Heel pain     Obesity (BMI 30-39. 9)     Nephrolithiasis     Paresthesias     Neuropathy (HCC)     B12 deficiency     Vitamin D deficiency     Numbness and tingling of both feet     Numbness and tingling of hand     Heaviness of upper extremi

## 2018-10-17 NOTE — PATIENT INSTRUCTIONS
Thank you for choosing Damari Blanca PA-C at [place] [address]  To Do: Ora Cooney  1. Heat to neck  2. Gentle stretching  3. OTC ibuprofen/acetaminophen as needed  4. Start muscle relaxant  5. Order for [address]  6.  Follow-up as needed    • Please s informing you that the insurance company approved your testing, please call Central Scheduling at [phone]  Please allow our office 5 business days to contact you regarding any testing results.     Refill policies:   Allow 3 business days for refills; c

## 2018-11-08 ENCOUNTER — HOSPITAL ENCOUNTER (OUTPATIENT)
Dept: ULTRASOUND IMAGING | Age: 38
Discharge: HOME | End: 2018-11-08
Attending: PHYSICIAN ASSISTANT
Payer: COMMERCIAL

## 2018-11-08 DIAGNOSIS — M54.2 NECK PAIN: ICD-10-CM

## 2018-11-08 DIAGNOSIS — R22.1 LOCALIZED SWELLING, MASS AND LUMP, NECK: ICD-10-CM

## 2018-11-08 PROCEDURE — 76536 US EXAM OF HEAD AND NECK: CPT | Performed by: PHYSICIAN ASSISTANT

## 2018-11-09 DIAGNOSIS — L72.9 SKIN CYST: Primary | ICD-10-CM

## 2018-11-09 NOTE — PROGRESS NOTES
Possibly a simple cyst  If still bothering pt, I would give referral for general surgery, Dr. Beto Biggs

## 2018-11-12 ENCOUNTER — OFFICE VISIT (OUTPATIENT)
Dept: SURGERY | Facility: CLINIC | Age: 38
End: 2018-11-12
Payer: COMMERCIAL

## 2018-11-12 VITALS
BODY MASS INDEX: 28.25 KG/M2 | SYSTOLIC BLOOD PRESSURE: 122 MMHG | DIASTOLIC BLOOD PRESSURE: 82 MMHG | HEART RATE: 79 BPM | WEIGHT: 180 LBS | RESPIRATION RATE: 16 BRPM | HEIGHT: 67 IN | OXYGEN SATURATION: 98 %

## 2018-11-12 DIAGNOSIS — M54.2 NECK PAIN ON RIGHT SIDE: Primary | ICD-10-CM

## 2018-11-12 DIAGNOSIS — M54.2 MUSCULOSKELETAL NECK PAIN: ICD-10-CM

## 2018-11-12 PROCEDURE — 99243 OFF/OP CNSLTJ NEW/EST LOW 30: CPT | Performed by: SURGERY

## 2018-11-12 NOTE — H&P
New Patient Visit Note       Active Problems      1. Neck pain on right side    2. Musculoskeletal neck pain        Chief Complaint   Patient presents with:  Mass: NW PT ref by PCP for cyst on neck- US done 11/8. PT states that she noticed mass about 5wks. use: No      Sexual activity: Not on file    Other Topics      Concerns:         Service: Not Asked        Blood Transfusions: Not Asked        Caffeine Concern: No          Daily; 1 cup         Occupational Exposure: Not Asked        Hobby Hazards Psychiatric/Behavioral: Negative for confusion, hallucinations and sleep disturbance.        Physical Findings   /82   Pulse 79   Resp 16   Ht 67\"   Wt 180 lb   SpO2 98%   BMI 28.19 kg/m²   Physical Exam   Constitutional: She is oriented to person, completed             The risks, benefits, complications, possible outcomes and alternatives of the procedure were explained to the patient in detail. Expected postoperative pain, recuperation and postoperative course was also reviewed.   All questions fr

## 2018-11-13 ENCOUNTER — TELEPHONE (OUTPATIENT)
Dept: SURGERY | Facility: CLINIC | Age: 38
End: 2018-11-13

## 2018-11-13 NOTE — TELEPHONE ENCOUNTER
LM for pt that her MRI ordered does not require prior authorization per Bronson MITCHELL With Felisha Casas. Central scheduling phone number was given to her to call to schedule. Advised her to make a follow up appt after she completes the MRI.

## 2018-11-28 ENCOUNTER — PATIENT MESSAGE (OUTPATIENT)
Dept: FAMILY MEDICINE CLINIC | Facility: CLINIC | Age: 38
End: 2018-11-28

## 2018-11-28 DIAGNOSIS — R61 UNEXPLAINED NIGHT SWEATS: Primary | ICD-10-CM

## 2018-11-29 RX ORDER — VENLAFAXINE HYDROCHLORIDE 75 MG/1
75 CAPSULE, EXTENDED RELEASE ORAL DAILY
Qty: 90 CAPSULE | Refills: 0 | Status: SHIPPED | OUTPATIENT
Start: 2018-11-29 | End: 2019-02-27

## 2018-11-29 NOTE — TELEPHONE ENCOUNTER
Patient was seen 10/17/18 and it is noted:   Insomnia  Pt is no longer having nightsweats  She states the temazepam is working well  She is sleeping well    Current meds:  temazepam 15 MG Oral Cap Take 1 capsule (15 mg total) by mouth nightly as needed for

## 2018-11-29 NOTE — PROGRESS NOTES
We can increase the effexor if she would like, it would have to be 2 tabs, but we could go from the 150mg to 225mg which would be the maximum dose she could use. Let's start with that and see if increasing the effexor helps with the night sweats.     I am

## 2018-11-29 NOTE — TELEPHONE ENCOUNTER
From: Ivania Zelaya  To: Henry Banks  Sent: 11/28/2018 7:01 PM CST  Subject: Visit Follow-up Question    For Shola Conteh,     I have been having difficulty with my medication. The effexor isn't keeping me going through the whole days.  I have be

## 2018-12-17 ENCOUNTER — OFFICE VISIT (OUTPATIENT)
Dept: FAMILY MEDICINE CLINIC | Facility: CLINIC | Age: 38
End: 2018-12-17
Payer: COMMERCIAL

## 2018-12-17 VITALS
RESPIRATION RATE: 16 BRPM | BODY MASS INDEX: 31.5 KG/M2 | TEMPERATURE: 98 F | SYSTOLIC BLOOD PRESSURE: 120 MMHG | HEIGHT: 66 IN | DIASTOLIC BLOOD PRESSURE: 80 MMHG | WEIGHT: 196 LBS | HEART RATE: 84 BPM

## 2018-12-17 DIAGNOSIS — H65.03 BILATERAL ACUTE SEROUS OTITIS MEDIA, RECURRENCE NOT SPECIFIED: Primary | ICD-10-CM

## 2018-12-17 DIAGNOSIS — R42 VERTIGO: ICD-10-CM

## 2018-12-17 DIAGNOSIS — J01.00 ACUTE MAXILLARY SINUSITIS, RECURRENCE NOT SPECIFIED: ICD-10-CM

## 2018-12-17 PROCEDURE — 99214 OFFICE O/P EST MOD 30 MIN: CPT | Performed by: PHYSICIAN ASSISTANT

## 2018-12-17 RX ORDER — IPRATROPIUM BROMIDE 21 UG/1
2 SPRAY, METERED NASAL EVERY 12 HOURS
Qty: 1 BOTTLE | Refills: 0 | Status: SHIPPED | OUTPATIENT
Start: 2018-12-17 | End: 2019-02-15

## 2018-12-17 RX ORDER — METHYLPREDNISOLONE 4 MG/1
TABLET ORAL
Qty: 1 KIT | Refills: 0 | Status: SHIPPED | OUTPATIENT
Start: 2018-12-17 | End: 2019-01-07 | Stop reason: ALTCHOICE

## 2018-12-17 RX ORDER — AMOXICILLIN 875 MG/1
875 TABLET, COATED ORAL 2 TIMES DAILY
Qty: 20 TABLET | Refills: 0 | Status: SHIPPED | OUTPATIENT
Start: 2018-12-17 | End: 2019-01-07 | Stop reason: ALTCHOICE

## 2018-12-17 NOTE — PROGRESS NOTES
[facility] Internal Medicine Progress Note    CC:  Patient presents with:  Sinus Problem: sinus pressure x 3 days  Nasal Congestion  Dizziness      HPI:   HPI  Sinus problem  Sinus pressure  Nasal congestion  Dizziness  Symptoms have been going o rate, regular rhythm and normal heart sounds. Exam reveals no gallop and no friction rub. No murmur heard. Pulmonary/Chest: Effort normal and breath sounds normal. No respiratory distress. She has no wheezes. She has no rales.    Neurological: She is alexis extremity     Umbilical hernia     Primary insomnia     Anxiety and depression     Hemorrhagic cyst of ovary

## 2018-12-17 NOTE — PATIENT INSTRUCTIONS
Thank you for choosing Geoffrey Hare PA-C at [place] [address]  To Do: Rachel Cooney  1. Pt to begin medications as prescribed  2. OTC Allegra in AM, OTC Zyrtec in PM  3.  If symptoms persist or increase pt to call office    • Please signup for [facility] you that the insurance company approved your testing, please call Central Scheduling at [phone]  Please allow our office 5 business days to contact you regarding any testing results.     Refill policies:   Allow 3 business days for refills; controlled

## 2018-12-18 ENCOUNTER — HOSPITAL ENCOUNTER (OUTPATIENT)
Dept: MRI IMAGING | Age: 38
Discharge: HOME | End: 2018-12-18
Attending: SURGERY
Payer: COMMERCIAL

## 2018-12-18 DIAGNOSIS — M54.2 NECK PAIN ON RIGHT SIDE: ICD-10-CM

## 2018-12-18 PROCEDURE — 72156 MRI NECK SPINE W/O & W/DYE: CPT | Performed by: SURGERY

## 2018-12-18 PROCEDURE — A9575 INJ GADOTERATE MEGLUMI 0.1ML: HCPCS | Performed by: SURGERY

## 2018-12-31 RX ORDER — TEMAZEPAM 15 MG/1
15 CAPSULE ORAL NIGHTLY PRN
Qty: 30 CAPSULE | Refills: 2 | Status: SHIPPED
Start: 2018-12-31 | End: 2019-03-31

## 2018-12-31 NOTE — TELEPHONE ENCOUNTER
Requesting Temazepam  LOV: 12/17/18  RTC: none specified   Last Labs: n/a  Filled: 10/1/18 #30with 2 refills    Future Appointments   Date Time Provider Emma Hannonisti   1/7/2019 10:30 AM Em Hendricks MD [facility] [address]   1/7/2019  2:30 PM

## 2019-01-07 ENCOUNTER — PATIENT MESSAGE (OUTPATIENT)
Dept: FAMILY MEDICINE CLINIC | Facility: CLINIC | Age: 39
End: 2019-01-07

## 2019-01-07 ENCOUNTER — OFFICE VISIT (OUTPATIENT)
Dept: SURGERY | Facility: CLINIC | Age: 39
End: 2019-01-07
Payer: COMMERCIAL

## 2019-01-07 VITALS
RESPIRATION RATE: 16 BRPM | BODY MASS INDEX: 30.53 KG/M2 | OXYGEN SATURATION: 97 % | SYSTOLIC BLOOD PRESSURE: 138 MMHG | HEIGHT: 66 IN | WEIGHT: 190 LBS | HEART RATE: 95 BPM | DIASTOLIC BLOOD PRESSURE: 88 MMHG

## 2019-01-07 DIAGNOSIS — M54.2 NECK PAIN ON RIGHT SIDE: Primary | ICD-10-CM

## 2019-01-07 DIAGNOSIS — M54.2 MUSCULOSKELETAL NECK PAIN: ICD-10-CM

## 2019-01-07 DIAGNOSIS — M47.812 OSTEOARTHRITIS OF CERVICAL SPINE, UNSPECIFIED SPINAL OSTEOARTHRITIS COMPLICATION STATUS: ICD-10-CM

## 2019-01-07 PROCEDURE — 99214 OFFICE O/P EST MOD 30 MIN: CPT | Performed by: SURGERY

## 2019-01-07 RX ORDER — HYDROCODONE BITARTRATE AND ACETAMINOPHEN 5; 325 MG/1; MG/1
1 TABLET ORAL EVERY 8 HOURS PRN
Qty: 45 TABLET | Refills: 0 | Status: SHIPPED | OUTPATIENT
Start: 2019-01-07 | End: 2019-02-04 | Stop reason: ALTCHOICE

## 2019-01-08 NOTE — PROGRESS NOTES
Follow Up Visit Note       Active Problems      1. Neck pain on right side    2. Musculoskeletal neck pain          Chief Complaint   Patient presents with:  Neck Pain: EST PT f/u after MRI on 12/18.  PT states that she still has neck PN trouble sleeping du Systems  The Review of Systems has been reviewed by me during today. Review of Systems   Constitutional: Negative for diaphoresis, fever and unexpected weight change. HENT: Negative for congestion, nosebleeds, sore throat and trouble swallowing.     Eyes originally evaluated in this office in November for neck pain.   An ultrasound at that time revealed a small subcentimeter cutaneous cyst.  The patient complained of neck pain which did not appear to be related to this small subcutaneous cyst.  Therefore an

## 2019-01-11 RX ORDER — ACETAMINOPHEN AND CODEINE PHOSPHATE 300; 30 MG/1; MG/1
1 TABLET ORAL EVERY 6 HOURS PRN
Qty: 45 TABLET | Refills: 0 | Status: SHIPPED | OUTPATIENT
Start: 2019-01-11 | End: 2019-02-07

## 2019-01-11 NOTE — TELEPHONE ENCOUNTER
From: Luciana Rothman  Sent: 1/11/2019 8:58 AM CST  To: [facility] Clinical Staff  Subject: RE: RE: Test Results Question    ----- Message from Mar Faye PA-C sent at 1/11/2019 8:58 AM CST -----       ----- Message from Tessy Wolfe

## 2019-01-11 NOTE — PROGRESS NOTES
We are still waiting for approval, we can try Tylenol #3 and see that gets approved. Signed and in my bin.

## 2019-01-14 ENCOUNTER — PATIENT MESSAGE (OUTPATIENT)
Dept: FAMILY MEDICINE CLINIC | Facility: CLINIC | Age: 39
End: 2019-01-14

## 2019-01-14 NOTE — PROGRESS NOTES
PT saw Dr Dede Franks specialists   PT advised of this via Outrightt message    Amanda Herrera MD  Pain Medicine  [facility]  [address] [place] [address]  Phone: [phone]  Fax: [phone]

## 2019-01-14 NOTE — TELEPHONE ENCOUNTER
From: Bryson Dumont  To: Key De Guzman  Sent: 1/14/2019 10:59 AM CST  Subject: Visit Follow-up Question    So I was able to make an appointment with the neurosurgeon and get the Tylenol 3.  Yesterday I was lifting a mop bucket and did somethi

## 2019-02-04 ENCOUNTER — OFFICE VISIT (OUTPATIENT)
Dept: SURGERY | Facility: CLINIC | Age: 39
End: 2019-02-04
Payer: COMMERCIAL

## 2019-02-04 VITALS
HEIGHT: 66 IN | SYSTOLIC BLOOD PRESSURE: 132 MMHG | BODY MASS INDEX: 28.93 KG/M2 | HEART RATE: 92 BPM | WEIGHT: 180 LBS | DIASTOLIC BLOOD PRESSURE: 78 MMHG

## 2019-02-04 DIAGNOSIS — R29.898 WEAKNESS OF BOTH LOWER EXTREMITIES: ICD-10-CM

## 2019-02-04 DIAGNOSIS — M48.02 CERVICAL STENOSIS OF SPINAL CANAL: Primary | ICD-10-CM

## 2019-02-04 DIAGNOSIS — R26.9 NEUROLOGIC GAIT DYSFUNCTION: ICD-10-CM

## 2019-02-04 DIAGNOSIS — R29.898 WEAKNESS OF BOTH UPPER EXTREMITIES: ICD-10-CM

## 2019-02-04 PROCEDURE — 99214 OFFICE O/P EST MOD 30 MIN: CPT | Performed by: PHYSICIAN ASSISTANT

## 2019-02-04 RX ORDER — PREDNISONE 10 MG/1
10 TABLET ORAL DAILY
Qty: 30 TABLET | Refills: 0 | Status: SHIPPED | OUTPATIENT
Start: 2019-02-04 | End: 2019-03-06 | Stop reason: ALTCHOICE

## 2019-02-04 NOTE — PROGRESS NOTES
Location of Pain:  Pt states that she has cervical pain. Pt states that she has numbness and tingling in the right hand. Pt states that she has a cold sensation in the right hand and right foot. Pt states that she has no weakness in the right hand.  Pt stat

## 2019-02-04 NOTE — PROGRESS NOTES
[facility] Neurosurgery Consultation      HISTORY OF PRESENT Lisa Robles is a 45year old [facility] female for spinal consultation.   She has an initial history of developing right hand numbness and tingling and bilateral foot numbness and tingling after w She did recently finish a Medrol Dosepak for sinusitis. It did not help her overall neurological condition.     PAST MEDICAL HISTORY:  Past Medical History:   Diagnosis Date   • B12 deficiency 7/7/2017   • Kidney stone    • Neuropathy 7/7/2017   • Obesity No current facility-administered medications on file prior to visit. REVIEW OF SYSTEMS:  A 10-point system was reviewed. Pertinent positives and negatives are noted in HPI. PHYSICAL EXAMINATION:  GENERAL:  Patient is in no acute distress.   HEENT: Biceps   Brachioradialis   Triceps    Patellar    Ankle  Hamstring   Right      1+           1+       0        3+       3+           Left      1+           1+       0        3+       3+              IMAGING:  MRI of the cervical spine 12/18/18  C5-6 spon

## 2019-02-04 NOTE — PATIENT INSTRUCTIONS
Refill policies:    • Allow 2-3 business days for refills; controlled substances may take longer.   • Contact your pharmacy at least 5 days prior to running out of medication and have them send an electronic request or submit request through the “request re been approved by your insurer. Depending on your insurance carrier, approval may take 3-10 days. It is highly recommended patients contact their insurance carrier directly to determine coverage.   If test is done without insurance authorization, patient ma the upper and lower extremities  3. Urinary urgency  4. Neurological gait dysfunction    PLAN:  1. Prednisone taper  2. Soft cervical collar, cervical traction, wall glides. Limit forward flexed activities as much as possible for the neck  3.   Follow-

## 2019-02-07 RX ORDER — ACETAMINOPHEN AND CODEINE PHOSPHATE 300; 30 MG/1; MG/1
1 TABLET ORAL EVERY 6 HOURS PRN
Qty: 45 TABLET | Refills: 0 | Status: SHIPPED | OUTPATIENT
Start: 2019-02-07 | End: 2019-08-01

## 2019-02-07 NOTE — TELEPHONE ENCOUNTER
CODEINE PHOSPHATE/APAP 01/26/2019 01/11/2019 300-30MG 17 tablet 0 2 , [facility].   CODEINE PHOSPHATE/APAP 01/11/2019 01/11/2019 300-30MG 28 tablet 0 4 , [facility]

## 2019-02-07 NOTE — TELEPHONE ENCOUNTER
Acetaminophen-Codeine #3 300-30 MG Oral Tab 45 tablet 0 1/11/2019    Sig :  Take 1 tablet by mouth every 6 (six) hours as needed for Pain.  Do not take and drive, will make drowsy            Incoming fax from pharmacy, signed RX by physician will not go thr

## 2019-02-08 ENCOUNTER — PATIENT MESSAGE (OUTPATIENT)
Dept: SURGERY | Facility: CLINIC | Age: 39
End: 2019-02-08

## 2019-02-08 NOTE — TELEPHONE ENCOUNTER
From: Angelique Alert  To: Paul Ramos  Sent: 2/8/2019 5:12 AM CST  Subject: Visit Follow-up Question    I have started the prednisone and using the neck braces and traction as suggested but have noticed an increase heart rate and feeling out of

## 2019-02-08 NOTE — TELEPHONE ENCOUNTER
Patient was reached by phone at home. She states that started getting some heart palpitations from the prednisone. She feels a cervical traction is helping. She still having intermittent symptoms in the arms and legs.   The cramping may be slightly wolfgang

## 2019-02-14 ENCOUNTER — TELEPHONE (OUTPATIENT)
Dept: SURGERY | Facility: CLINIC | Age: 39
End: 2019-02-14

## 2019-02-14 ENCOUNTER — TELEPHONE (OUTPATIENT)
Dept: FAMILY MEDICINE CLINIC | Facility: CLINIC | Age: 39
End: 2019-02-14

## 2019-02-14 ENCOUNTER — HOSPITAL ENCOUNTER (EMERGENCY)
Age: 39
Discharge: HOME | End: 2019-02-14
Attending: EMERGENCY MEDICINE
Payer: COMMERCIAL

## 2019-02-14 VITALS
RESPIRATION RATE: 17 BRPM | SYSTOLIC BLOOD PRESSURE: 131 MMHG | TEMPERATURE: 99 F | HEART RATE: 84 BPM | OXYGEN SATURATION: 100 % | HEIGHT: 66 IN | BODY MASS INDEX: 29.73 KG/M2 | DIASTOLIC BLOOD PRESSURE: 82 MMHG | WEIGHT: 185 LBS

## 2019-02-14 DIAGNOSIS — T38.0X5A ADVERSE EFFECT OF PREDNISONE, INITIAL ENCOUNTER: Primary | ICD-10-CM

## 2019-02-14 LAB
ALBUMIN SERPL-MCNC: 3.8 G/DL (ref 3.4–5)
ALBUMIN/GLOB SERPL: 1.1 {RATIO} (ref 1–2)
ALP LIVER SERPL-CCNC: 83 U/L (ref 37–98)
ALT SERPL-CCNC: 22 U/L (ref 13–56)
ANION GAP SERPL CALC-SCNC: 5 MMOL/L (ref 0–18)
AST SERPL-CCNC: 11 U/L (ref 15–37)
ATRIAL RATE: 82 BPM
BASOPHILS # BLD AUTO: 0.06 X10(3) UL (ref 0–0.2)
BASOPHILS NFR BLD AUTO: 0.5 %
BILIRUB SERPL-MCNC: 0.3 MG/DL (ref 0.1–2)
BUN BLD-MCNC: 17 MG/DL (ref 7–18)
BUN/CREAT SERPL: 17.3 (ref 10–20)
CALCIUM BLD-MCNC: 8.3 MG/DL (ref 8.5–10.1)
CHLORIDE SERPL-SCNC: 101 MMOL/L (ref 98–107)
CO2 SERPL-SCNC: 30 MMOL/L (ref 21–32)
CREAT BLD-MCNC: 0.98 MG/DL (ref 0.55–1.02)
DEPRECATED RDW RBC AUTO: 42.8 FL (ref 35.1–46.3)
EOSINOPHIL # BLD AUTO: 0.06 X10(3) UL (ref 0–0.7)
EOSINOPHIL NFR BLD AUTO: 0.5 %
ERYTHROCYTE [DISTWIDTH] IN BLOOD BY AUTOMATED COUNT: 13.2 % (ref 11–15)
GLOBULIN PLAS-MCNC: 3.4 G/DL (ref 2.8–4.4)
GLUCOSE BLD-MCNC: 90 MG/DL (ref 70–99)
HAV IGM SER QL: 2.2 MG/DL (ref 1.6–2.6)
HCT VFR BLD AUTO: 42.3 % (ref 35–48)
HGB BLD-MCNC: 13.1 G/DL (ref 12–16)
IMM GRANULOCYTES # BLD AUTO: 0.07 X10(3) UL (ref 0–1)
IMM GRANULOCYTES NFR BLD: 0.6 %
LYMPHOCYTES # BLD AUTO: 5.08 X10(3) UL (ref 1–4)
LYMPHOCYTES NFR BLD AUTO: 42.3 %
M PROTEIN MFR SERPL ELPH: 7.2 G/DL (ref 6.4–8.2)
MCH RBC QN AUTO: 28.1 PG (ref 26–34)
MCHC RBC AUTO-ENTMCNC: 31 G/DL (ref 31–37)
MCV RBC AUTO: 90.8 FL (ref 80–100)
MONOCYTES # BLD AUTO: 0.81 X10(3) UL (ref 0.1–1)
MONOCYTES NFR BLD AUTO: 6.7 %
NEUTROPHILS # BLD AUTO: 5.93 X10 (3) UL (ref 1.5–7.7)
NEUTROPHILS # BLD AUTO: 5.93 X10(3) UL (ref 1.5–7.7)
NEUTROPHILS NFR BLD AUTO: 49.4 %
OSMOLALITY SERPL CALC.SUM OF ELEC: 283 MOSM/KG (ref 275–295)
P AXIS: 66 DEGREES
P-R INTERVAL: 124 MS
PLATELET # BLD AUTO: 302 10(3)UL (ref 150–450)
POTASSIUM SERPL-SCNC: 3.7 MMOL/L (ref 3.5–5.1)
Q-T INTERVAL: 366 MS
QRS DURATION: 86 MS
QTC CALCULATION (BEZET): 427 MS
R AXIS: 52 DEGREES
RBC # BLD AUTO: 4.66 X10(6)UL (ref 3.8–5.3)
SODIUM SERPL-SCNC: 136 MMOL/L (ref 136–145)
T AXIS: 29 DEGREES
VENTRICULAR RATE: 82 BPM
WBC # BLD AUTO: 12 X10(3) UL (ref 4–11)

## 2019-02-14 PROCEDURE — 96360 HYDRATION IV INFUSION INIT: CPT

## 2019-02-14 PROCEDURE — 80053 COMPREHEN METABOLIC PANEL: CPT | Performed by: EMERGENCY MEDICINE

## 2019-02-14 PROCEDURE — 83735 ASSAY OF MAGNESIUM: CPT | Performed by: EMERGENCY MEDICINE

## 2019-02-14 PROCEDURE — 85025 COMPLETE CBC W/AUTO DIFF WBC: CPT | Performed by: EMERGENCY MEDICINE

## 2019-02-14 PROCEDURE — 99285 EMERGENCY DEPT VISIT HI MDM: CPT

## 2019-02-14 PROCEDURE — 93005 ELECTROCARDIOGRAM TRACING: CPT

## 2019-02-14 PROCEDURE — 93010 ELECTROCARDIOGRAM REPORT: CPT

## 2019-02-14 RX ORDER — PREDNISONE 20 MG/1
20 TABLET ORAL ONCE
Status: COMPLETED | OUTPATIENT
Start: 2019-02-14 | End: 2019-02-14

## 2019-02-14 NOTE — TELEPHONE ENCOUNTER
Patient was in the ER today for evaluation. The ER attending evaluated her, EKG she is in normal sinus rhythm with rate of 84, blood pressure is 143/86. He was neurologically stable. Blood work was negative. Given IV fluids and feeling better.   ER at

## 2019-02-14 NOTE — TELEPHONE ENCOUNTER
Pt wants to know what to do; high bp, feeling hot/dizzy. High bp started after prednisone rxd, according to patient.

## 2019-02-14 NOTE — ED PROVIDER NOTES
Patient Seen in: [facility] In [place]    History   Patient presents with:  Arrythmia/Palpitations (cardiovascular)    Stated Complaint: Palpitations/SOB x 1 week.     HPI    Patient presents to the emergency department with she should °C) (Temporal)   Resp 20   Ht 167.6 cm (5' 6\")   Wt 83.9 kg   LMP 02/10/2019   SpO2 100%   BMI 29.86 kg/m²          Physical Exam    Slightly flushed woman lying on emergency department bed her HEENT exam is within normal limits her neck is supple her calvin pm    Follow-up:  Maritza Mcgee MD  [address]  [address] [facility]  [phone]    Schedule an appointment as soon as possible for a visit          Medications Prescribed:  Current Discharge Medication List

## 2019-02-14 NOTE — TELEPHONE ENCOUNTER
Patient was started on Prednisone 10 mg taper dose on 2/4/2019. Patient noticed symptoms (Increased HR, HA, dizziness, warm sensation) originally appearing 2/6, worsened the next couple days, then improved and now worsening.      This morning B/P was 12

## 2019-02-14 NOTE — TELEPHONE ENCOUNTER
Received call from ER doctor at [place] Dr. Madeline Espinosa regarding this patient. Patient had taken 2 courses of Medrol Dosepak the second 1 with an antibiotic for presumed sinus infection.   Then she had seen her neurosurgeon physician assistant for chronic

## 2019-02-14 NOTE — TELEPHONE ENCOUNTER
Attempted to reach patient and left VM to contact our office to schedule an appt with David Reyes    Future Appointments   Date Time Provider Emma Plummer   2/18/2019  2:30 PM Paul Taylor

## 2019-02-14 NOTE — ED INITIAL ASSESSMENT (HPI)
Patient c/o intermittent palpitations, dizziness, and SOB since starting medication. Started on prednisone x 1 week ago. +nausea last night, resolved today.

## 2019-02-15 ENCOUNTER — OFFICE VISIT (OUTPATIENT)
Dept: FAMILY MEDICINE CLINIC | Facility: CLINIC | Age: 39
End: 2019-02-15
Payer: COMMERCIAL

## 2019-02-15 VITALS
BODY MASS INDEX: 30 KG/M2 | DIASTOLIC BLOOD PRESSURE: 82 MMHG | HEIGHT: 66 IN | SYSTOLIC BLOOD PRESSURE: 120 MMHG | RESPIRATION RATE: 16 BRPM | HEART RATE: 92 BPM

## 2019-02-15 DIAGNOSIS — R42 DIZZY: ICD-10-CM

## 2019-02-15 DIAGNOSIS — R20.2 PARESTHESIAS: ICD-10-CM

## 2019-02-15 DIAGNOSIS — R11.0 NAUSEA: Primary | ICD-10-CM

## 2019-02-15 DIAGNOSIS — M54.12 CERVICAL RADICULOPATHY: ICD-10-CM

## 2019-02-15 PROCEDURE — 99214 OFFICE O/P EST MOD 30 MIN: CPT | Performed by: PHYSICIAN ASSISTANT

## 2019-02-15 RX ORDER — PREDNISONE 1 MG/1
5 TABLET ORAL DAILY
Qty: 8 TABLET | Refills: 0 | Status: SHIPPED | OUTPATIENT
Start: 2019-02-15 | End: 2019-03-06 | Stop reason: ALTCHOICE

## 2019-02-15 NOTE — PROGRESS NOTES
[facility] Internal Medicine Progress Note    CC:  Patient presents with:  ER F/U: [facility] on 2/14/19 for adverse effect to prednisone      HPI:   HPI  ER F/U  Pt saw specialist for her neck   Was started with traction, neck brace  She was star :  Review of Systems   Constitutional: Negative for chills and fever. Respiratory: Negative for cough and shortness of breath. Cardiovascular: Positive for palpitations. Negative for chest pain and leg swelling.    Gastrointestinal: Positive for nausea got to ER immediately    No orders of the defined types were placed in this encounter.       Meds & Refills for this Visit:  Requested Prescriptions     Signed Prescriptions Disp Refills   • predniSONE 5 MG Oral Tab 8 tablet 0     Sig: Take 1 tablet (5 mg t

## 2019-02-15 NOTE — PATIENT INSTRUCTIONS
Thank you for choosing Tristan Raza PA-C at [place] [address]  To Do: Marshall Cooney  1. Pt to decrease prednisone to 10mg for 5 days, and then decrease prednisone to 5mg for 5 days  2.  Follow-up with neuro as scheduled    • Please signup for [facility] that the insurance company approved your testing, please call Central Scheduling at [phone]  Please allow our office 5 business days to contact you regarding any testing results.     Refill policies:   Allow 3 business days for refills; controlled subs

## 2019-02-18 ENCOUNTER — OFFICE VISIT (OUTPATIENT)
Dept: SURGERY | Facility: CLINIC | Age: 39
End: 2019-02-18
Payer: COMMERCIAL

## 2019-02-18 VITALS — SYSTOLIC BLOOD PRESSURE: 132 MMHG | DIASTOLIC BLOOD PRESSURE: 76 MMHG | HEART RATE: 90 BPM

## 2019-02-18 DIAGNOSIS — R26.9 NEUROLOGIC GAIT DYSFUNCTION: ICD-10-CM

## 2019-02-18 DIAGNOSIS — R29.898 WEAKNESS OF BOTH UPPER EXTREMITIES: ICD-10-CM

## 2019-02-18 DIAGNOSIS — R29.898 WEAKNESS OF BOTH LOWER EXTREMITIES: ICD-10-CM

## 2019-02-18 DIAGNOSIS — M47.819 SPONDYLOSIS WITHOUT MYELOPATHY: ICD-10-CM

## 2019-02-18 DIAGNOSIS — M48.02 CERVICAL STENOSIS OF SPINAL CANAL: Primary | ICD-10-CM

## 2019-02-18 PROCEDURE — 99213 OFFICE O/P EST LOW 20 MIN: CPT | Performed by: PHYSICIAN ASSISTANT

## 2019-02-18 NOTE — PROGRESS NOTES
Pt is here for follow up for soft cervical collar and cervical traction. Pt states that she has been using the cervical collar and cervical traction. Pt states that she has been having good results with the cervical traction.      Pt states that she is

## 2019-02-18 NOTE — PATIENT INSTRUCTIONS
Refill policies:    • Allow 2-3 business days for refills; controlled substances may take longer.   • Contact your pharmacy at least 5 days prior to running out of medication and have them send an electronic request or submit request through the “request re been approved by your insurer. Depending on your insurance carrier, approval may take 3-10 days. It is highly recommended patients contact their insurance carrier directly to determine coverage.   If test is done without insurance authorization, patient ma consider cervical or lumbar facet injections  2.  cervical traction, wall glides. Limit forward flexed activities as much as possible for the neck  3. Follow-up in 8 weeks.   •

## 2019-02-18 NOTE — PROGRESS NOTES
[facility] Neurosurgery follow-up      HISTORY OF PRESENT ILLNESS:Parisa Dorantes is a 45year old  female returns in follow-up. She states her cervical pain is slightly improved at a 5/10. She has no back pain now.   She continues to have cramping in her l She complains of cervical pain which is a 4–10/10. It radiates to the scapular area. She complains of thoracic tightness and the need to adjust her back. She occasionally gets shooting right arm pain.   She complains of numbness in all 5 fingers of the r SKIN: Warm, dry, no rashes. NEUROLOGICAL:  This patient is alert and orientated x 3. Speech fluent. Comprehension intact. Face is symmetrical.    SPINE: Neck pain on flexion and extension. S Poor tandem stance with eyes closed.   Right foot 3-4 beats o C5-6 spondylosis with right asymmetrical disc osteophyte complex with acquired central stenosis and very limited CSF reserve with slight anterior cord deformity      MRI of the lumbar spine from 7/5/18 shows minimal desiccation of disc at L3-4, L4-5 L5-S1

## 2019-02-27 RX ORDER — VENLAFAXINE HYDROCHLORIDE 75 MG/1
75 CAPSULE, EXTENDED RELEASE ORAL DAILY
Qty: 90 CAPSULE | Refills: 0 | Status: SHIPPED | OUTPATIENT
Start: 2019-02-27 | End: 2019-06-05

## 2019-02-27 RX ORDER — VENLAFAXINE HYDROCHLORIDE 150 MG/1
CAPSULE, EXTENDED RELEASE ORAL
Qty: 90 CAPSULE | Refills: 0 | Status: SHIPPED | OUTPATIENT
Start: 2019-02-27 | End: 2019-06-05

## 2019-02-27 NOTE — TELEPHONE ENCOUNTER
[facility] [address] [place] [address] [address] AT [facility], [phone], [phone]   Medication Detail      Disp Refills Start End    Venlafaxine HCl ER 75 MG Oral Capsule SR 24 Hr 90 capsule 0 11/29/2018     Sig -

## 2019-03-04 ENCOUNTER — PATIENT MESSAGE (OUTPATIENT)
Dept: SURGERY | Facility: CLINIC | Age: 39
End: 2019-03-04

## 2019-03-04 NOTE — TELEPHONE ENCOUNTER
From: Federico Gifford  To: Paul Garcia  Sent: 3/4/2019 10:57 AM CST  Subject: Visit Follow-up Question    Mr. Maura Donnelly     So I am officially off the prednisone for an entire week.  My blood pressure and pulse have been normal. Unfortunately I hav

## 2019-03-06 ENCOUNTER — OFFICE VISIT (OUTPATIENT)
Dept: PAIN CLINIC | Facility: CLINIC | Age: 39
End: 2019-03-06
Payer: COMMERCIAL

## 2019-03-06 ENCOUNTER — TELEPHONE (OUTPATIENT)
Dept: PAIN CLINIC | Facility: CLINIC | Age: 39
End: 2019-03-06

## 2019-03-06 VITALS
WEIGHT: 185 LBS | RESPIRATION RATE: 18 BRPM | SYSTOLIC BLOOD PRESSURE: 110 MMHG | BODY MASS INDEX: 30 KG/M2 | DIASTOLIC BLOOD PRESSURE: 80 MMHG | HEART RATE: 88 BPM

## 2019-03-06 DIAGNOSIS — M50.20 BULGING OF CERVICAL INTERVERTEBRAL DISC: ICD-10-CM

## 2019-03-06 DIAGNOSIS — M48.02 NEURAL FORAMINAL STENOSIS OF CERVICAL SPINE: ICD-10-CM

## 2019-03-06 DIAGNOSIS — M47.22 CERVICAL SPONDYLOSIS WITH RADICULOPATHY: Primary | ICD-10-CM

## 2019-03-06 PROCEDURE — 99214 OFFICE O/P EST MOD 30 MIN: CPT | Performed by: NURSE PRACTITIONER

## 2019-03-06 RX ORDER — GABAPENTIN 100 MG/1
100 CAPSULE ORAL 3 TIMES DAILY
Qty: 90 CAPSULE | Refills: 0 | Status: SHIPPED | OUTPATIENT
Start: 2019-03-06 | End: 2019-08-27 | Stop reason: DRUGHIGH

## 2019-03-06 RX ORDER — DICLOFENAC POTASSIUM 50 MG/1
50 TABLET, FILM COATED ORAL 2 TIMES DAILY
Qty: 60 TABLET | Refills: 0 | Status: SHIPPED | OUTPATIENT
Start: 2019-03-06 | End: 2019-03-07

## 2019-03-06 NOTE — TELEPHONE ENCOUNTER
Medical clearance needed- no    Pt seen in OV today by Joe North and recommended for CESIs with Dr. India Salgado (X 3). Please begin PA process for procedure(s).      Laterality: n/a  Level(s): n/a    Pt informed callback will be given when PA feedback received and p

## 2019-03-06 NOTE — PROGRESS NOTES
Name: Shady Keen   : 1980   DOS: 3/6/2019     Pain Clinic Visit:   Shady Keen is a 45year old female who presents for exacerbation of her chroinc neck pain. Pt has been followed by neurosurgery over the past several months.  Pt reports drive, will make drowsy Disp: 45 tablet Rfl: 0   Montelukast Sodium (SINGULAIR) 10 MG Oral Tab Take 1 tablet (10 mg total) by mouth daily. Disp: 90 tablet Rfl: 3   temazepam 15 MG Oral Cap Take 1 capsule (15 mg total) by mouth nightly as needed for Sleep. studies were personally reviewed. MRI Cervical spine (12/18/2019): FINDINGS:    CERVICAL DISC LEVELS:  C2-C3:  No significant disc/facet abnormality, spinal stenosis, or foraminal narrowing.   C3-C4:  No significant disc/facet abnormality, spinal stenos cervical spine      Plan  1. Cervical epidural injection, series of 3 at C6/C7  Risks and benefits discussed with patient. Advised to expectations of pre and post injections.  Advised of conditions that may require cancellations and or rescheduling injectio

## 2019-03-07 ENCOUNTER — TELEPHONE (OUTPATIENT)
Dept: PAIN CLINIC | Facility: CLINIC | Age: 39
End: 2019-03-07

## 2019-03-07 NOTE — TELEPHONE ENCOUNTER
Spoke with Bjorn Mcfarlane from [address] [place] [address] she states no Pa needed for [address].   Call reference number [id number]  Call time 17:28

## 2019-03-07 NOTE — TELEPHONE ENCOUNTER
Called patient and scheduled for:    Appointment Date:  3/12/19  Procedure Time:  230 pm  Check-in Time:  130 pm    Appointment Date:  3/19/19  Procedure Time:  330 pm  Check-in Time:  230 pm    Appointment Date:  4/2/19  Procedure Time:  145 pm  Check-in

## 2019-03-07 NOTE — TELEPHONE ENCOUNTER
Received a fax from [place] stating medication- Diclofenac Potassium 50 mg tabs is on long term back order.    Requesting alternative medication recommendations

## 2019-03-12 ENCOUNTER — APPOINTMENT (OUTPATIENT)
Dept: GENERAL RADIOLOGY | Facility: HOSPITAL | Age: 39
End: 2019-03-12
Attending: ANESTHESIOLOGY
Payer: COMMERCIAL

## 2019-03-12 ENCOUNTER — HOSPITAL ENCOUNTER (OUTPATIENT)
Facility: HOSPITAL | Age: 39
Setting detail: HOSPITAL OUTPATIENT SURGERY
Discharge: HOME | End: 2019-03-12
Attending: ANESTHESIOLOGY | Admitting: ANESTHESIOLOGY
Payer: COMMERCIAL

## 2019-03-12 VITALS
OXYGEN SATURATION: 100 % | DIASTOLIC BLOOD PRESSURE: 67 MMHG | RESPIRATION RATE: 20 BRPM | HEART RATE: 74 BPM | SYSTOLIC BLOOD PRESSURE: 122 MMHG | TEMPERATURE: 99 F

## 2019-03-12 DIAGNOSIS — M50.20 BULGING OF CERVICAL INTERVERTEBRAL DISC: ICD-10-CM

## 2019-03-12 DIAGNOSIS — M48.02 NEURAL FORAMINAL STENOSIS OF CERVICAL SPINE: ICD-10-CM

## 2019-03-12 DIAGNOSIS — M47.22 CERVICAL SPONDYLOSIS WITH RADICULOPATHY: ICD-10-CM

## 2019-03-12 PROCEDURE — 3E0R33Z INTRODUCTION OF ANTI-INFLAMMATORY INTO SPINAL CANAL, PERCUTANEOUS APPROACH: ICD-10-PCS | Performed by: ANESTHESIOLOGY

## 2019-03-12 PROCEDURE — 99152 MOD SED SAME PHYS/QHP 5/>YRS: CPT | Performed by: ANESTHESIOLOGY

## 2019-03-12 RX ORDER — SODIUM CHLORIDE, SODIUM LACTATE, POTASSIUM CHLORIDE, CALCIUM CHLORIDE 600; 310; 30; 20 MG/100ML; MG/100ML; MG/100ML; MG/100ML
100 INJECTION, SOLUTION INTRAVENOUS CONTINUOUS
Status: DISCONTINUED | OUTPATIENT
Start: 2019-03-12 | End: 2019-03-12

## 2019-03-12 RX ORDER — DEXAMETHASONE SODIUM PHOSPHATE 10 MG/ML
INJECTION, SOLUTION INTRAMUSCULAR; INTRAVENOUS AS NEEDED
Status: DISCONTINUED | OUTPATIENT
Start: 2019-03-12 | End: 2019-03-12

## 2019-03-12 RX ORDER — ONDANSETRON 2 MG/ML
INJECTION INTRAMUSCULAR; INTRAVENOUS
Status: DISCONTINUED
Start: 2019-03-12 | End: 2019-03-12

## 2019-03-12 RX ORDER — LIDOCAINE HYDROCHLORIDE 10 MG/ML
INJECTION, SOLUTION EPIDURAL; INFILTRATION; INTRACAUDAL; PERINEURAL AS NEEDED
Status: DISCONTINUED | OUTPATIENT
Start: 2019-03-12 | End: 2019-03-12

## 2019-03-12 RX ORDER — ONDANSETRON 2 MG/ML
4 INJECTION INTRAMUSCULAR; INTRAVENOUS ONCE AS NEEDED
Status: DISCONTINUED | OUTPATIENT
Start: 2019-03-12 | End: 2019-03-12

## 2019-03-12 RX ORDER — 0.9 % SODIUM CHLORIDE 0.9 %
VIAL (ML) INJECTION AS NEEDED
Status: DISCONTINUED | OUTPATIENT
Start: 2019-03-12 | End: 2019-03-12

## 2019-03-12 RX ORDER — DIPHENHYDRAMINE HYDROCHLORIDE 50 MG/ML
50 INJECTION INTRAMUSCULAR; INTRAVENOUS ONCE AS NEEDED
Status: DISCONTINUED | OUTPATIENT
Start: 2019-03-12 | End: 2019-03-12

## 2019-03-12 RX ORDER — MIDAZOLAM HYDROCHLORIDE 1 MG/ML
INJECTION INTRAMUSCULAR; INTRAVENOUS AS NEEDED
Status: DISCONTINUED | OUTPATIENT
Start: 2019-03-12 | End: 2019-03-12

## 2019-03-12 RX ORDER — ONDANSETRON 2 MG/ML
4 INJECTION INTRAMUSCULAR; INTRAVENOUS ONCE AS NEEDED
Status: COMPLETED | OUTPATIENT
Start: 2019-03-12 | End: 2019-03-12

## 2019-03-12 NOTE — H&P
History & Physical Examination    Patient Name: Elder Mares  MRN: [id number]  CSN: [id number]  YOB: 1980    Pre-Operative Diagnosis:  Cervical spondylosis with radiculopathy [M47.22]  Bulging of cervical intervertebral disc [M50.20]  Neur thumb surgery    • OTHER SURGICAL HISTORY  08/12/2016    Cystoscopy and Stent Removal   • SACROILIAC JOINT INJECTION RIGHT OR LEFT Right 8/16/2018    Performed by Wilbur Flores MD at [facility] MAIN OR   • TONSILLECTOMY     • [place] [address]

## 2019-03-14 ENCOUNTER — TELEPHONE (OUTPATIENT)
Dept: PAIN CLINIC | Facility: CLINIC | Age: 39
End: 2019-03-14

## 2019-03-14 NOTE — TELEPHONE ENCOUNTER
Left message for patient, confirmed procedure date of 3/19/19 and to be checked in at outpatient registration at 2:30pm. Patient called pre-procedure line and understood instructions/Patient instructed to call pre-procedure line before procedure at [phone]

## 2019-03-14 NOTE — OPERATIVE REPORT
[facility]  Operative Report  3/12/2019     Sarah Meléndez Patient Status:  Hospital Outpatient Surgery    1980 MRN [id number]   [facility] ENDOSCOPY Attending No att. providers found   [facility] Day # 0 PCP Mara Reyez MD     In level.  The epidural space was reached by using a loss of resistance to air technique. There was no C. S.F. or blood through the needle.  After obtaining a good epidurogram by injecting Omnipaque 180 1 mL, a combination of normal saline and dexamethasone 10

## 2019-03-15 NOTE — MR AVS SNAPSHOT
[address] [place] [address]  [phone]               Thank you for choosing us for your health care visit with Thania Degroot PT.   We are glad to serve you and happy to provide you with this summ Patient called requesting  order sent to [facility] for a bedside commode      For medical emergencies, dial 911.            Visit [facility] online at  [email or web address]

## 2019-03-19 ENCOUNTER — HOSPITAL ENCOUNTER (OUTPATIENT)
Facility: HOSPITAL | Age: 39
Setting detail: HOSPITAL OUTPATIENT SURGERY
Discharge: HOME | End: 2019-03-19
Attending: ANESTHESIOLOGY | Admitting: ANESTHESIOLOGY
Payer: COMMERCIAL

## 2019-03-19 ENCOUNTER — APPOINTMENT (OUTPATIENT)
Dept: GENERAL RADIOLOGY | Facility: HOSPITAL | Age: 39
End: 2019-03-19
Attending: ANESTHESIOLOGY
Payer: COMMERCIAL

## 2019-03-19 VITALS
HEART RATE: 72 BPM | TEMPERATURE: 96 F | SYSTOLIC BLOOD PRESSURE: 121 MMHG | OXYGEN SATURATION: 98 % | RESPIRATION RATE: 16 BRPM | DIASTOLIC BLOOD PRESSURE: 86 MMHG

## 2019-03-19 DIAGNOSIS — M50.20 BULGING OF CERVICAL INTERVERTEBRAL DISC: ICD-10-CM

## 2019-03-19 DIAGNOSIS — M48.02 NEURAL FORAMINAL STENOSIS OF CERVICAL SPINE: ICD-10-CM

## 2019-03-19 DIAGNOSIS — M47.22 CERVICAL SPONDYLOSIS WITH RADICULOPATHY: ICD-10-CM

## 2019-03-19 PROCEDURE — 3E0R33Z INTRODUCTION OF ANTI-INFLAMMATORY INTO SPINAL CANAL, PERCUTANEOUS APPROACH: ICD-10-PCS | Performed by: ANESTHESIOLOGY

## 2019-03-19 PROCEDURE — 99152 MOD SED SAME PHYS/QHP 5/>YRS: CPT | Performed by: ANESTHESIOLOGY

## 2019-03-19 RX ORDER — SODIUM CHLORIDE, SODIUM LACTATE, POTASSIUM CHLORIDE, CALCIUM CHLORIDE 600; 310; 30; 20 MG/100ML; MG/100ML; MG/100ML; MG/100ML
100 INJECTION, SOLUTION INTRAVENOUS CONTINUOUS
Status: DISCONTINUED | OUTPATIENT
Start: 2019-03-19 | End: 2019-03-19

## 2019-03-19 RX ORDER — DEXAMETHASONE SODIUM PHOSPHATE 10 MG/ML
INJECTION, SOLUTION INTRAMUSCULAR; INTRAVENOUS AS NEEDED
Status: DISCONTINUED | OUTPATIENT
Start: 2019-03-19 | End: 2019-03-19

## 2019-03-19 RX ORDER — ONDANSETRON 2 MG/ML
4 INJECTION INTRAMUSCULAR; INTRAVENOUS ONCE AS NEEDED
Status: CANCELLED | OUTPATIENT
Start: 2019-03-19 | End: 2019-03-19

## 2019-03-19 RX ORDER — LIDOCAINE HYDROCHLORIDE 10 MG/ML
INJECTION, SOLUTION EPIDURAL; INFILTRATION; INTRACAUDAL; PERINEURAL AS NEEDED
Status: DISCONTINUED | OUTPATIENT
Start: 2019-03-19 | End: 2019-03-19

## 2019-03-19 RX ORDER — DIPHENHYDRAMINE HYDROCHLORIDE 50 MG/ML
50 INJECTION INTRAMUSCULAR; INTRAVENOUS ONCE AS NEEDED
Status: CANCELLED | OUTPATIENT
Start: 2019-03-19 | End: 2019-03-19

## 2019-03-19 RX ORDER — ONDANSETRON 2 MG/ML
4 INJECTION INTRAMUSCULAR; INTRAVENOUS ONCE AS NEEDED
Status: COMPLETED | OUTPATIENT
Start: 2019-03-19 | End: 2019-03-19

## 2019-03-19 RX ORDER — MIDAZOLAM HYDROCHLORIDE 1 MG/ML
INJECTION INTRAMUSCULAR; INTRAVENOUS AS NEEDED
Status: DISCONTINUED | OUTPATIENT
Start: 2019-03-19 | End: 2019-03-19

## 2019-03-19 RX ORDER — 0.9 % SODIUM CHLORIDE 0.9 %
VIAL (ML) INJECTION AS NEEDED
Status: DISCONTINUED | OUTPATIENT
Start: 2019-03-19 | End: 2019-03-19

## 2019-03-19 RX ORDER — ONDANSETRON 2 MG/ML
INJECTION INTRAMUSCULAR; INTRAVENOUS
Status: COMPLETED
Start: 2019-03-19 | End: 2019-03-19

## 2019-03-19 NOTE — H&P
History & Physical Examination    Patient Name: Violetta Conti  MRN: [id number]  [facility]: [id number]  YOB: 1980    Pre-Operative Diagnosis:  Cervical spondylosis with radiculopathy [M47.22]  Bulging of cervical intervertebral disc [M50.20]  Neur 3/12/2019    Performed by Gregoria Roe MD at [facility] ENDOSCOPY   • OTHER Right 12/01/2017    thumb surgery    • OTHER SURGICAL HISTORY  08/12/2016    Cystoscopy and Stent Removal   • SACROILIAC JOINT INJECTION RIGHT OR LEFT Right 8/16/2018    Performed by

## 2019-03-20 NOTE — OPERATIVE REPORT
[facility]  Operative Report  3/19/2019     Equilla File Patient Status:  Hospital Outpatient Surgery    1980 MRN [id number]   [facility] ENDOSCOPY Attending No att. providers found   [address] Day # 0 PCP Tano Anderson MD     In prone.  After comprehensive monitors were applied, the patient's neck was prepped and draped sterilely.   After local anesthetic was instilled in the skin and subcutaneous tissue, a 20-gauge Tuohy needle was introduced and advanced under fluoroscopy at [facility]

## 2019-03-22 ENCOUNTER — TELEPHONE (OUTPATIENT)
Dept: SURGERY | Facility: CLINIC | Age: 39
End: 2019-03-22

## 2019-03-22 NOTE — TELEPHONE ENCOUNTER
Called patient and rescheduled for:    Appointment Date:  4/23/19  Procedure Time:  830 am  Check-in Time:  730 am      Patient verbalized understanding.

## 2019-03-26 ENCOUNTER — TELEPHONE (OUTPATIENT)
Dept: PAIN CLINIC | Facility: CLINIC | Age: 39
End: 2019-03-26

## 2019-03-26 NOTE — TELEPHONE ENCOUNTER
LM regarding post procedure of cervical epidural injection. Provided office number and informed Pt if any concerns or questions to call the office.

## 2019-04-01 ENCOUNTER — TELEPHONE (OUTPATIENT)
Dept: SURGERY | Facility: CLINIC | Age: 39
End: 2019-04-01

## 2019-04-01 NOTE — TELEPHONE ENCOUNTER
Approve/Deny set up RX. Last refill 12/31/18 #30 (2) per Malena Reynolds.   Last Shellye Bas 2/15/19 Nausea/dizzy +2 DXs

## 2019-04-04 RX ORDER — TEMAZEPAM 15 MG/1
CAPSULE ORAL
Qty: 30 CAPSULE | Refills: 0 | Status: SHIPPED | OUTPATIENT
Start: 2019-04-04 | End: 2019-06-13

## 2019-04-18 ENCOUNTER — TELEPHONE (OUTPATIENT)
Dept: SURGERY | Facility: CLINIC | Age: 39
End: 2019-04-18

## 2019-04-18 NOTE — TELEPHONE ENCOUNTER
Left message for patient, confirmed procedure date of 4/23/19 with Dr Jerris Cranker and to be checked in at outpatient registration at 7:30 am. Patient instructed to call pre-procedure line before procedure at [phone].  Patient instructed to call office if ther

## 2019-04-23 ENCOUNTER — HOSPITAL ENCOUNTER (OUTPATIENT)
Facility: HOSPITAL | Age: 39
Setting detail: HOSPITAL OUTPATIENT SURGERY
Discharge: HOME | End: 2019-04-23
Attending: ANESTHESIOLOGY | Admitting: ANESTHESIOLOGY

## 2019-04-23 ENCOUNTER — APPOINTMENT (OUTPATIENT)
Dept: GENERAL RADIOLOGY | Facility: HOSPITAL | Age: 39
End: 2019-04-23
Attending: ANESTHESIOLOGY

## 2019-04-23 VITALS
HEART RATE: 86 BPM | TEMPERATURE: 97 F | SYSTOLIC BLOOD PRESSURE: 118 MMHG | RESPIRATION RATE: 18 BRPM | DIASTOLIC BLOOD PRESSURE: 79 MMHG | OXYGEN SATURATION: 98 %

## 2019-04-23 DIAGNOSIS — M47.22 CERVICAL SPONDYLOSIS WITH RADICULOPATHY: ICD-10-CM

## 2019-04-23 DIAGNOSIS — M50.20 BULGING OF CERVICAL INTERVERTEBRAL DISC: ICD-10-CM

## 2019-04-23 DIAGNOSIS — M48.02 NEURAL FORAMINAL STENOSIS OF CERVICAL SPINE: ICD-10-CM

## 2019-04-23 PROCEDURE — 3E0R33Z INTRODUCTION OF ANTI-INFLAMMATORY INTO SPINAL CANAL, PERCUTANEOUS APPROACH: ICD-10-PCS | Performed by: ANESTHESIOLOGY

## 2019-04-23 PROCEDURE — B01B1ZZ FLUOROSCOPY OF SPINAL CORD USING LOW OSMOLAR CONTRAST: ICD-10-PCS | Performed by: ANESTHESIOLOGY

## 2019-04-23 PROCEDURE — 99152 MOD SED SAME PHYS/QHP 5/>YRS: CPT | Performed by: ANESTHESIOLOGY

## 2019-04-23 RX ORDER — ONDANSETRON 2 MG/ML
4 INJECTION INTRAMUSCULAR; INTRAVENOUS ONCE AS NEEDED
Status: DISCONTINUED | OUTPATIENT
Start: 2019-04-23 | End: 2019-04-23

## 2019-04-23 RX ORDER — ONDANSETRON 2 MG/ML
INJECTION INTRAMUSCULAR; INTRAVENOUS
Status: DISCONTINUED
Start: 2019-04-23 | End: 2019-04-23

## 2019-04-23 RX ORDER — DEXAMETHASONE SODIUM PHOSPHATE 10 MG/ML
INJECTION, SOLUTION INTRAMUSCULAR; INTRAVENOUS AS NEEDED
Status: DISCONTINUED | OUTPATIENT
Start: 2019-04-23 | End: 2019-04-23

## 2019-04-23 RX ORDER — 0.9 % SODIUM CHLORIDE 0.9 %
VIAL (ML) INJECTION AS NEEDED
Status: DISCONTINUED | OUTPATIENT
Start: 2019-04-23 | End: 2019-04-23

## 2019-04-23 RX ORDER — MIDAZOLAM HYDROCHLORIDE 1 MG/ML
INJECTION INTRAMUSCULAR; INTRAVENOUS AS NEEDED
Status: DISCONTINUED | OUTPATIENT
Start: 2019-04-23 | End: 2019-04-23

## 2019-04-23 RX ORDER — DIPHENHYDRAMINE HYDROCHLORIDE 50 MG/ML
50 INJECTION INTRAMUSCULAR; INTRAVENOUS ONCE AS NEEDED
Status: DISCONTINUED | OUTPATIENT
Start: 2019-04-23 | End: 2019-04-23

## 2019-04-23 RX ORDER — ONDANSETRON 2 MG/ML
4 INJECTION INTRAMUSCULAR; INTRAVENOUS ONCE AS NEEDED
Status: COMPLETED | OUTPATIENT
Start: 2019-04-23 | End: 2019-04-23

## 2019-04-23 RX ORDER — SODIUM CHLORIDE, SODIUM LACTATE, POTASSIUM CHLORIDE, CALCIUM CHLORIDE 600; 310; 30; 20 MG/100ML; MG/100ML; MG/100ML; MG/100ML
100 INJECTION, SOLUTION INTRAVENOUS CONTINUOUS
Status: DISCONTINUED | OUTPATIENT
Start: 2019-04-23 | End: 2019-04-23

## 2019-04-23 RX ORDER — LIDOCAINE HYDROCHLORIDE 10 MG/ML
INJECTION, SOLUTION EPIDURAL; INFILTRATION; INTRACAUDAL; PERINEURAL AS NEEDED
Status: DISCONTINUED | OUTPATIENT
Start: 2019-04-23 | End: 2019-04-23

## 2019-04-23 NOTE — H&P
History & Physical Examination    Patient Name: Sarah Combs  MRN: [id number]  CSN: [id number]  YOB: 1980    Pre-Operative Diagnosis:  Cervical spondylosis with radiculopathy [M47.22]  Bulging of cervical intervertebral disc [M50.20]  Neur EPIDURAL STEROID INJECTION N/A 3/19/2019    Performed by Destiny Mackey MD at [address] [place] [address] STEROID INJECTION N/A 3/12/2019    Performed by Destiny Mackey MD at [facility] ENDOSCOPY   • OTHER Right 12/01/2017    thumb surgery    • OTHER

## 2019-04-23 NOTE — OPERATIVE REPORT
[facility]  Operative Report  2019     [address] [place] [address] Patient Status:  Hospital Outpatient Surgery    1980 MRN [id number]   [facility] ENDOSCOPY Attending No att. providers found   [address] Day # 0 PCP Hortencia Zendejas MD     In monitors were applied, the patient's neck was prepped and draped sterilely. After local anesthetic was instilled in the skin and subcutaneous tissue, a 20-gauge Tuohy needle was introduced and advanced under fluoroscopy at C6-C7 level.   The epidural space

## 2019-05-08 NOTE — TELEPHONE ENCOUNTER
Spoke with patient regarding CERVICAL EPIDURAL STEROID INJECTION  on 04/23/19. Patient states no relief of pain after injection. Patient has an appointment with Neurosurgery on 06/18/19.

## 2019-06-06 NOTE — TELEPHONE ENCOUNTER
Requested Medications      Name from pharmacy: VENLAFAXINE ER 75MG CAPSULES         Will file in chart as: VENLAFAXINE HCL ER 75 MG Oral Capsule SR 24 Hr    Sig: TAKE 1 CAPSULE(75 MG) BY MOUTH DAILY    Disp:  90 capsule    Refills:  0    Start: 6/5/2019

## 2019-06-11 RX ORDER — VENLAFAXINE HYDROCHLORIDE 75 MG/1
CAPSULE, EXTENDED RELEASE ORAL
Qty: 90 CAPSULE | Refills: 0 | Status: SHIPPED | OUTPATIENT
Start: 2019-06-11 | End: 2019-08-01

## 2019-06-11 RX ORDER — VENLAFAXINE HYDROCHLORIDE 150 MG/1
CAPSULE, EXTENDED RELEASE ORAL
Qty: 90 CAPSULE | Refills: 0 | Status: SHIPPED | OUTPATIENT
Start: 2019-06-11 | End: 2019-08-01

## 2019-06-13 ENCOUNTER — PATIENT MESSAGE (OUTPATIENT)
Dept: FAMILY MEDICINE CLINIC | Facility: CLINIC | Age: 39
End: 2019-06-13

## 2019-06-13 NOTE — PROGRESS NOTES
Requesting Temazepam 15mg  LOV: 2/15/19  RTC: prn  Last Relevant Labs: n/a  Filled: 4/4/19 #30 with 0 refills    No future appointments. See attached email. Med pended if you're okay with refilling.

## 2019-06-13 NOTE — TELEPHONE ENCOUNTER
From: Arlin Pablo  To: Jordan Osorio  Sent: 6/13/2019 1:17 PM CDT  Subject: Prescription Question    For David Reyes,    Thank you for approving my medicine for anxiety. I am working on getting a appointment scheduled but work has picked up.  Wo

## 2019-06-17 ENCOUNTER — PATIENT MESSAGE (OUTPATIENT)
Dept: FAMILY MEDICINE CLINIC | Facility: CLINIC | Age: 39
End: 2019-06-17

## 2019-06-17 RX ORDER — TEMAZEPAM 15 MG/1
CAPSULE ORAL
Qty: 30 CAPSULE | Refills: 0 | Status: SHIPPED
Start: 2019-06-17 | End: 2019-08-27 | Stop reason: DRUGHIGH

## 2019-06-17 NOTE — TELEPHONE ENCOUNTER
From: Piyush Gaona  To: Oliva Dozier  Sent: 6/17/2019 12:36 PM CDT  Subject: Prescription Question    That would be fantastic if you could fax it to that location.      Thank you   Haley Frias

## 2019-06-17 NOTE — PROGRESS NOTES
Faxed Rx for Temazepam 15mg approved by Joyce Sparrow 6/17/19 to  Home	[facility] [phone]. Fax confirmation received. Task completed.

## 2019-08-01 ENCOUNTER — LAB ENCOUNTER (OUTPATIENT)
Dept: LAB | Age: 39
End: 2019-08-01
Attending: PHYSICIAN ASSISTANT
Payer: COMMERCIAL

## 2019-08-01 ENCOUNTER — OFFICE VISIT (OUTPATIENT)
Dept: FAMILY MEDICINE CLINIC | Facility: CLINIC | Age: 39
End: 2019-08-01
Payer: COMMERCIAL

## 2019-08-01 VITALS
RESPIRATION RATE: 16 BRPM | DIASTOLIC BLOOD PRESSURE: 82 MMHG | SYSTOLIC BLOOD PRESSURE: 118 MMHG | HEIGHT: 65.5 IN | WEIGHT: 213 LBS | BODY MASS INDEX: 35.06 KG/M2 | TEMPERATURE: 98 F | HEART RATE: 83 BPM

## 2019-08-01 DIAGNOSIS — E53.8 B12 DEFICIENCY: ICD-10-CM

## 2019-08-01 DIAGNOSIS — G62.9 NEUROPATHY: ICD-10-CM

## 2019-08-01 DIAGNOSIS — F51.01 PRIMARY INSOMNIA: ICD-10-CM

## 2019-08-01 DIAGNOSIS — Z00.00 LABORATORY EXAM ORDERED AS PART OF ROUTINE GENERAL MEDICAL EXAMINATION: ICD-10-CM

## 2019-08-01 DIAGNOSIS — R20.2 PARESTHESIAS: ICD-10-CM

## 2019-08-01 DIAGNOSIS — F41.9 ANXIETY AND DEPRESSION: ICD-10-CM

## 2019-08-01 DIAGNOSIS — E55.9 VITAMIN D DEFICIENCY: ICD-10-CM

## 2019-08-01 DIAGNOSIS — F32.A ANXIETY AND DEPRESSION: ICD-10-CM

## 2019-08-01 DIAGNOSIS — Z00.00 WELLNESS EXAMINATION: Primary | ICD-10-CM

## 2019-08-01 LAB
ALBUMIN SERPL-MCNC: 3.8 G/DL (ref 3.4–5)
ALBUMIN/GLOB SERPL: 1.1 {RATIO} (ref 1–2)
ALP LIVER SERPL-CCNC: 94 U/L (ref 37–98)
ALT SERPL-CCNC: 19 U/L (ref 13–56)
ANION GAP SERPL CALC-SCNC: 6 MMOL/L (ref 0–18)
AST SERPL-CCNC: 11 U/L (ref 15–37)
BASOPHILS # BLD AUTO: 0.03 X10(3) UL (ref 0–0.2)
BASOPHILS NFR BLD AUTO: 0.5 %
BILIRUB SERPL-MCNC: 0.3 MG/DL (ref 0.1–2)
BUN BLD-MCNC: 15 MG/DL (ref 7–18)
BUN/CREAT SERPL: 18.8 (ref 10–20)
CALCIUM BLD-MCNC: 9.3 MG/DL (ref 8.5–10.1)
CHLORIDE SERPL-SCNC: 105 MMOL/L (ref 98–112)
CHOLEST SMN-MCNC: 143 MG/DL (ref ?–200)
CO2 SERPL-SCNC: 27 MMOL/L (ref 21–32)
CREAT BLD-MCNC: 0.8 MG/DL (ref 0.55–1.02)
DEPRECATED RDW RBC AUTO: 41 FL (ref 35.1–46.3)
EOSINOPHIL # BLD AUTO: 0.05 X10(3) UL (ref 0–0.7)
EOSINOPHIL NFR BLD AUTO: 0.8 %
ERYTHROCYTE [DISTWIDTH] IN BLOOD BY AUTOMATED COUNT: 12.2 % (ref 11–15)
EST. AVERAGE GLUCOSE BLD GHB EST-MCNC: 114 MG/DL (ref 68–126)
GLOBULIN PLAS-MCNC: 3.4 G/DL (ref 2.8–4.4)
GLUCOSE BLD-MCNC: 88 MG/DL (ref 70–99)
HBA1C MFR BLD HPLC: 5.6 % (ref ?–5.7)
HCT VFR BLD AUTO: 40.5 % (ref 35–48)
HDLC SERPL-MCNC: 40 MG/DL (ref 40–59)
HGB BLD-MCNC: 12.8 G/DL (ref 12–16)
IMM GRANULOCYTES # BLD AUTO: 0.02 X10(3) UL (ref 0–1)
IMM GRANULOCYTES NFR BLD: 0.3 %
LDLC SERPL CALC-MCNC: 82 MG/DL (ref ?–100)
LYMPHOCYTES # BLD AUTO: 2.24 X10(3) UL (ref 1–4)
LYMPHOCYTES NFR BLD AUTO: 34.7 %
M PROTEIN MFR SERPL ELPH: 7.2 G/DL (ref 6.4–8.2)
MCH RBC QN AUTO: 28.8 PG (ref 26–34)
MCHC RBC AUTO-ENTMCNC: 31.6 G/DL (ref 31–37)
MCV RBC AUTO: 91.2 FL (ref 80–100)
MONOCYTES # BLD AUTO: 0.45 X10(3) UL (ref 0.1–1)
MONOCYTES NFR BLD AUTO: 7 %
NEUTROPHILS # BLD AUTO: 3.66 X10 (3) UL (ref 1.5–7.7)
NEUTROPHILS # BLD AUTO: 3.66 X10(3) UL (ref 1.5–7.7)
NEUTROPHILS NFR BLD AUTO: 56.7 %
NONHDLC SERPL-MCNC: 103 MG/DL (ref ?–130)
OSMOLALITY SERPL CALC.SUM OF ELEC: 286 MOSM/KG (ref 275–295)
PLATELET # BLD AUTO: 298 10(3)UL (ref 150–450)
POTASSIUM SERPL-SCNC: 4.1 MMOL/L (ref 3.5–5.1)
RBC # BLD AUTO: 4.44 X10(6)UL (ref 3.8–5.3)
SODIUM SERPL-SCNC: 138 MMOL/L (ref 136–145)
T4 FREE SERPL-MCNC: 0.8 NG/DL (ref 0.8–1.7)
TRIGL SERPL-MCNC: 104 MG/DL (ref 30–149)
TSI SER-ACNC: 2.26 MIU/ML (ref 0.36–3.74)
VIT B12 SERPL-MCNC: 273 PG/ML (ref 193–986)
VIT D+METAB SERPL-MCNC: 26.8 NG/ML (ref 30–100)
VLDLC SERPL CALC-MCNC: 21 MG/DL (ref 0–30)
WBC # BLD AUTO: 6.5 X10(3) UL (ref 4–11)

## 2019-08-01 PROCEDURE — 84439 ASSAY OF FREE THYROXINE: CPT | Performed by: PHYSICIAN ASSISTANT

## 2019-08-01 PROCEDURE — 82306 VITAMIN D 25 HYDROXY: CPT | Performed by: PHYSICIAN ASSISTANT

## 2019-08-01 PROCEDURE — 80061 LIPID PANEL: CPT | Performed by: PHYSICIAN ASSISTANT

## 2019-08-01 PROCEDURE — 82607 VITAMIN B-12: CPT | Performed by: PHYSICIAN ASSISTANT

## 2019-08-01 PROCEDURE — 83036 HEMOGLOBIN GLYCOSYLATED A1C: CPT | Performed by: PHYSICIAN ASSISTANT

## 2019-08-01 PROCEDURE — 80050 GENERAL HEALTH PANEL: CPT | Performed by: PHYSICIAN ASSISTANT

## 2019-08-01 PROCEDURE — 36415 COLL VENOUS BLD VENIPUNCTURE: CPT | Performed by: PHYSICIAN ASSISTANT

## 2019-08-01 PROCEDURE — 99395 PREV VISIT EST AGE 18-39: CPT | Performed by: PHYSICIAN ASSISTANT

## 2019-08-01 PROCEDURE — 99213 OFFICE O/P EST LOW 20 MIN: CPT | Performed by: PHYSICIAN ASSISTANT

## 2019-08-01 RX ORDER — VENLAFAXINE HYDROCHLORIDE 150 MG/1
150 CAPSULE, EXTENDED RELEASE ORAL
Qty: 90 CAPSULE | Refills: 3 | Status: SHIPPED | OUTPATIENT
Start: 2019-08-01 | End: 2020-07-15

## 2019-08-01 RX ORDER — TEMAZEPAM 30 MG/1
30 CAPSULE ORAL NIGHTLY PRN
Qty: 30 CAPSULE | Refills: 5 | Status: SHIPPED | OUTPATIENT
Start: 2019-08-01 | End: 2019-10-20

## 2019-08-01 RX ORDER — GABAPENTIN 300 MG/1
300 CAPSULE ORAL NIGHTLY
Qty: 90 CAPSULE | Refills: 0 | Status: SHIPPED | OUTPATIENT
Start: 2019-08-01 | End: 2019-09-24

## 2019-08-01 RX ORDER — VENLAFAXINE HYDROCHLORIDE 75 MG/1
CAPSULE, EXTENDED RELEASE ORAL
Qty: 90 CAPSULE | Refills: 3 | Status: ON HOLD | OUTPATIENT
Start: 2019-08-01 | End: 2020-02-07

## 2019-08-01 RX ORDER — MONTELUKAST SODIUM 10 MG/1
10 TABLET ORAL DAILY
Qty: 90 TABLET | Refills: 3 | Status: SHIPPED | OUTPATIENT
Start: 2019-08-01 | End: 2020-07-15

## 2019-08-01 RX ORDER — ACETAMINOPHEN AND CODEINE PHOSPHATE 300; 30 MG/1; MG/1
1 TABLET ORAL EVERY 6 HOURS PRN
Qty: 45 TABLET | Refills: 0 | Status: SHIPPED | OUTPATIENT
Start: 2019-08-01 | End: 2019-09-24

## 2019-08-01 NOTE — PROGRESS NOTES
REASON FOR VISIT:    Ivania Zelaya is a 44year old female who presents for an [address]     Patient is on Effexor 225 mg daily for her anxiety, she did not have insurance for 4 months, she has been spacing out her effexor so that she had Internal Lab or Procedure   Colonoscopy Screen Every 10 years There are no preventive care reminders to display for this patient. Flex Sigmoidoscopy Screen  Every 5 years No results found for this or any previous visit.    Fecal Occult Blood  Annually No 2 times daily if needed Disp: 90 capsule Rfl: 0   Acetaminophen-Codeine #3 300-30 MG Oral Tab Take 1 tablet by mouth every 6 (six) hours as needed for Pain.  Do not take and drive, will make drowsy Disp: 45 tablet Rfl: 0   Montelukast Sodium (SINGULAIR) 10 tobacco: Never Used    Alcohol use: No      Comment: very rarely    Drug use: No    Occ:       REVIEW OF SYSTEMS:   GENERAL: feels well otherwise  SKIN: denies any unusual skin lesions  EYES: denies blurred vision or double vision  HEENT: denies ed screening    Laboratory exam ordered as part of routine general medical examination  -     CBC WITH DIFFERENTIAL WITH PLATELET; Future  -     COMP METABOLIC PANEL (14); Future  -     HEMOGLOBIN A1C; Future  -     LIPID PANEL;  Future  -     TSH+FREE T4; Fut plan.  The patient is asked to return in 6 months for medication check  Diet counseling perfomed  Exercise counseling perfomed    SUGGESTED VACCINATIONS - Influenza, Pneumococcal, Zoster, Tetanus, HPV   Influenza:  There are no preventive care reminders to

## 2019-08-01 NOTE — PATIENT INSTRUCTIONS
Thank you for choosing Bárbara Vasquez PA-C at [place] [address]  To Do: Deborah Cooney  1. Begin medications as prescribed  2. Get lab work done  3. Follow-up in 6 months, sooner if problems  4.   Start with the Effexor 150 mg daily x2 weeks and or MRI, it may take up to 10 business days to receive approval from your insurance company.      Once our office has called informing you that the insurance company approved your testing, please call Central Scheduling at [phone]  Please allow our offi

## 2019-08-05 ENCOUNTER — TELEPHONE (OUTPATIENT)
Dept: FAMILY MEDICINE CLINIC | Facility: CLINIC | Age: 39
End: 2019-08-05

## 2019-08-05 RX ORDER — ERGOCALCIFEROL 1.25 MG/1
50000 CAPSULE ORAL WEEKLY
Qty: 12 CAPSULE | Refills: 0 | Status: SHIPPED | OUTPATIENT
Start: 2019-08-05 | End: 2019-09-04

## 2019-08-05 NOTE — PROGRESS NOTES
Low B12, can begin monthly injections x 6 months  Low Vit D, begin 50,000 IU weekly x 12 weeks, and then OTC 2,000 IU daily

## 2019-08-05 NOTE — TELEPHONE ENCOUNTER
Prior authorization for ACETAMINOPHEN/COD #3 (300/30MG)  sent on 8/2/2019 to [employer], still awaiting response of approval or denial.

## 2019-08-05 NOTE — TELEPHONE ENCOUNTER
----- Message from Keerthi Palumbo PA-C sent at 8/5/2019  8:03 AM CDT -----  Low B12, can begin monthly injections x 6 months  Low Vit D, begin 50,000 IU weekly x 12 weeks, and then OTC 2,000 IU daily

## 2019-08-12 ENCOUNTER — TELEPHONE (OUTPATIENT)
Dept: FAMILY MEDICINE CLINIC | Facility: CLINIC | Age: 39
End: 2019-08-12

## 2019-08-12 NOTE — TELEPHONE ENCOUNTER
Noted. B12 injection will be ordered at nurse visit.     Notes recorded by Tea Cloud PA-C on 8/5/2019 at 8:03 AM CDT  Low B12, can begin monthly injections x 6 months  Low Vit D, begin 50,000 IU weekly x 12 weeks, and then OTC 2,000 IU daily

## 2019-08-12 NOTE — TELEPHONE ENCOUNTER
Future Appointments   Date Time Provider Emma Kavitha   8/13/2019  9:00 AM [facility] 20 NURSE [facility] [address] [facility] [address]   10/3/2019  1:30 PM MD THERESE Cole2 [facility] [place]   2/3/2020  8:00 AM Lilly TORREZ PA-C [facility] [address] [facility] [address]     Patient s

## 2019-08-13 ENCOUNTER — NURSE ONLY (OUTPATIENT)
Dept: FAMILY MEDICINE CLINIC | Facility: CLINIC | Age: 39
End: 2019-08-13
Payer: COMMERCIAL

## 2019-08-13 DIAGNOSIS — E53.8 B12 DEFICIENCY: Primary | ICD-10-CM

## 2019-08-13 PROCEDURE — 96372 THER/PROPH/DIAG INJ SC/IM: CPT | Performed by: PHYSICIAN ASSISTANT

## 2019-08-13 RX ORDER — CYANOCOBALAMIN 1000 UG/ML
1000 INJECTION INTRAMUSCULAR; SUBCUTANEOUS ONCE
Status: COMPLETED | OUTPATIENT
Start: 2019-08-13 | End: 2019-08-13

## 2019-08-13 RX ADMIN — CYANOCOBALAMIN 1000 MCG: 1000 INJECTION INTRAMUSCULAR; SUBCUTANEOUS at 09:14:00

## 2019-08-13 NOTE — PROGRESS NOTES
Pt here for B12 #1. Given IM in L deltoid. Pt tolerated well with no adverse events. Pt will schedule next injection in 1 month.

## 2019-08-19 NOTE — TELEPHONE ENCOUNTER
Prescription drug approval received via fax from [employer]. ACETAMINOPHEN-CODEINE #3 approved for 45 tablets per 11 days; granted from 8/3/2019-11/3/2019.

## 2019-08-27 ENCOUNTER — OFFICE VISIT (OUTPATIENT)
Dept: FAMILY MEDICINE CLINIC | Facility: CLINIC | Age: 39
End: 2019-08-27
Payer: COMMERCIAL

## 2019-08-27 ENCOUNTER — HOSPITAL ENCOUNTER (OUTPATIENT)
Dept: GENERAL RADIOLOGY | Age: 39
Discharge: HOME | End: 2019-08-27
Attending: PHYSICIAN ASSISTANT
Payer: COMMERCIAL

## 2019-08-27 ENCOUNTER — PATIENT MESSAGE (OUTPATIENT)
Dept: FAMILY MEDICINE CLINIC | Facility: CLINIC | Age: 39
End: 2019-08-27

## 2019-08-27 VITALS
TEMPERATURE: 99 F | DIASTOLIC BLOOD PRESSURE: 78 MMHG | OXYGEN SATURATION: 96 % | HEART RATE: 95 BPM | RESPIRATION RATE: 14 BRPM | WEIGHT: 215 LBS | BODY MASS INDEX: 35.39 KG/M2 | SYSTOLIC BLOOD PRESSURE: 118 MMHG | HEIGHT: 65.5 IN

## 2019-08-27 DIAGNOSIS — R10.30 LOWER ABDOMINAL PAIN: Primary | ICD-10-CM

## 2019-08-27 DIAGNOSIS — R10.30 LOWER ABDOMINAL PAIN: ICD-10-CM

## 2019-08-27 DIAGNOSIS — M54.50 LUMBAR PAIN: ICD-10-CM

## 2019-08-27 DIAGNOSIS — R35.0 URINARY FREQUENCY: ICD-10-CM

## 2019-08-27 DIAGNOSIS — F51.01 PRIMARY INSOMNIA: ICD-10-CM

## 2019-08-27 LAB
APPEARANCE: CLEAR
BILIRUBIN: NEGATIVE
GLUCOSE (URINE DIPSTICK): NEGATIVE MG/DL
KETONES (URINE DIPSTICK): NEGATIVE MG/DL
LEUKOCYTES: NEGATIVE
MULTISTIX LOT#: NORMAL NUMERIC
NITRITE, URINE: NEGATIVE
OCCULT BLOOD: NEGATIVE
PH, URINE: 7 (ref 4.5–8)
PROTEIN (URINE DIPSTICK): NEGATIVE MG/DL
SPECIFIC GRAVITY: 1.02 (ref 1–1.03)
URINE-COLOR: YELLOW
UROBILINOGEN,SEMI-QN: 0.2 MG/DL (ref 0–1.9)

## 2019-08-27 PROCEDURE — 74019 RADEX ABDOMEN 2 VIEWS: CPT | Performed by: PHYSICIAN ASSISTANT

## 2019-08-27 PROCEDURE — 87086 URINE CULTURE/COLONY COUNT: CPT | Performed by: PHYSICIAN ASSISTANT

## 2019-08-27 PROCEDURE — 81003 URINALYSIS AUTO W/O SCOPE: CPT | Performed by: PHYSICIAN ASSISTANT

## 2019-08-27 PROCEDURE — 99214 OFFICE O/P EST MOD 30 MIN: CPT | Performed by: PHYSICIAN ASSISTANT

## 2019-08-27 RX ORDER — METHOCARBAMOL 750 MG/1
750 TABLET, FILM COATED ORAL 3 TIMES DAILY PRN
Qty: 60 TABLET | Refills: 0 | Status: SHIPPED | OUTPATIENT
Start: 2019-08-27 | End: 2019-09-24

## 2019-08-27 NOTE — TELEPHONE ENCOUNTER
From: Amira Vazquez  To: Josesito Quiroga  Sent: 8/27/2019 8:22 AM CDT  Subject: Visit Follow-up Question    For Rebekah Erickson  So I have been experience lower left back pain that lingers in the front lower part. I am.have had this pain since Sunday.

## 2019-08-27 NOTE — PROGRESS NOTES
[facility] Internal Medicine Progress Note    CC:  Patient presents with:  Low Back Pain: left side started sunday and pain shoots to her pelvic area, pt thinks it could be kidney stones.   Urinary Frequency      HPI:   HPI  Started Sunday mid aft temazepam 30 MG Oral Cap Take 1 capsule (30 mg total) by mouth nightly as needed for Sleep. Disp: 30 capsule Rfl: 5     No current facility-administered medications on file prior to visit.      Review of Systems :  Review of Systems   Constitutional: Collette Hamilton Lumbar back: She exhibits tenderness, pain and spasm. She exhibits normal range of motion, no bony tenderness, no swelling, no edema, no deformity and no laceration. Back:    Lymphadenopathy:     She has no cervical adenopathy.    Neurological: Hemorrhagic cyst of ovary

## 2019-08-27 NOTE — PATIENT INSTRUCTIONS
Thank you for choosing Chucky Habermann, PA-C at [place] [address]  To Do: Dana Cooney  1. Patient to get imaging done  2. Begin muscle relaxant  3. Heat to area and low back exercises  4.   Follow-up in 4 weeks, sooner if problems    • Please si informing you that the insurance company approved your testing, please call Central Scheduling at [phone]  Please allow our office 5 business days to contact you regarding any testing results.     Refill policies:   Allow 3 business days for refills; c

## 2019-08-28 DIAGNOSIS — N20.0 KIDNEY STONE: Primary | ICD-10-CM

## 2019-08-31 ENCOUNTER — HOSPITAL ENCOUNTER (OUTPATIENT)
Dept: ULTRASOUND IMAGING | Age: 39
Discharge: HOME | End: 2019-08-31
Attending: PHYSICIAN ASSISTANT
Payer: COMMERCIAL

## 2019-08-31 DIAGNOSIS — R10.30 LOWER ABDOMINAL PAIN: ICD-10-CM

## 2019-08-31 PROCEDURE — 76856 US EXAM PELVIC COMPLETE: CPT | Performed by: PHYSICIAN ASSISTANT

## 2019-08-31 PROCEDURE — 76830 TRANSVAGINAL US NON-OB: CPT | Performed by: PHYSICIAN ASSISTANT

## 2019-09-05 ENCOUNTER — PATIENT MESSAGE (OUTPATIENT)
Dept: FAMILY MEDICINE CLINIC | Facility: CLINIC | Age: 39
End: 2019-09-05

## 2019-09-05 DIAGNOSIS — M25.551 RIGHT HIP PAIN: Primary | ICD-10-CM

## 2019-09-05 NOTE — TELEPHONE ENCOUNTER
From: Cherie Szymanski  To: Norma Amadorroxanne  Sent: 9/5/2019 11:48 AM CDT  Subject: Test Results Question    I have a question about US PELVIS (TRANSABDOMINAL AND TRANSVAGINAL) (CPT=76856/57005) resulted on 8/31/19 at 6:23 PM.    Would the fluid b

## 2019-09-10 ENCOUNTER — NURSE ONLY (OUTPATIENT)
Dept: FAMILY MEDICINE CLINIC | Facility: CLINIC | Age: 39
End: 2019-09-10
Payer: COMMERCIAL

## 2019-09-10 DIAGNOSIS — E53.8 B12 DEFICIENCY: Primary | ICD-10-CM

## 2019-09-10 DIAGNOSIS — Z23 NEED FOR INFLUENZA VACCINATION: ICD-10-CM

## 2019-09-10 PROCEDURE — 96372 THER/PROPH/DIAG INJ SC/IM: CPT | Performed by: PHYSICIAN ASSISTANT

## 2019-09-10 PROCEDURE — 90686 IIV4 VACC NO PRSV 0.5 ML IM: CPT | Performed by: PHYSICIAN ASSISTANT

## 2019-09-10 PROCEDURE — 90471 IMMUNIZATION ADMIN: CPT | Performed by: PHYSICIAN ASSISTANT

## 2019-09-10 RX ORDER — CYANOCOBALAMIN 1000 UG/ML
1000 INJECTION INTRAMUSCULAR; SUBCUTANEOUS ONCE
Status: COMPLETED | OUTPATIENT
Start: 2019-09-10 | End: 2019-09-10

## 2019-09-10 RX ADMIN — CYANOCOBALAMIN 1000 MCG: 1000 INJECTION INTRAMUSCULAR; SUBCUTANEOUS at 09:26:00

## 2019-09-17 NOTE — PROGRESS NOTES
[place] David PA-C 3 minutes ago (2:36 PM)         Yes I have this pain on my right hip. It almost feels as if there is something on my hip bone. I don't have the lower back pain or anything with my ovaries.  Just this annoying ruthy

## 2019-09-19 NOTE — TELEPHONE ENCOUNTER
Regarding: RE: Test Results Question  Contact: [phone]  ----- Message from Victor Manuel Funez PA-C sent at 9/19/2019 12:08 PM CDT -----       ----- Message from Doug Urban to Jimmy Oconnor PA-C sent at 9/17/2019  2:36 PM -----   Yes I h

## 2019-09-24 DIAGNOSIS — M54.50 LUMBAR PAIN: ICD-10-CM

## 2019-09-24 RX ORDER — GABAPENTIN 300 MG/1
CAPSULE ORAL
Qty: 90 CAPSULE | Refills: 0 | Status: SHIPPED | OUTPATIENT
Start: 2019-09-24 | End: 2019-10-20

## 2019-09-24 RX ORDER — METHOCARBAMOL 750 MG/1
TABLET, FILM COATED ORAL
Qty: 60 TABLET | Refills: 0 | Status: SHIPPED | OUTPATIENT
Start: 2019-09-24 | End: 2019-12-23

## 2019-09-24 RX ORDER — ACETAMINOPHEN AND CODEINE PHOSPHATE 300; 30 MG/1; MG/1
TABLET ORAL
Qty: 45 TABLET | Refills: 0 | Status: SHIPPED | OUTPATIENT
Start: 2019-09-24 | End: 2019-11-21

## 2019-09-24 NOTE — TELEPHONE ENCOUNTER
Requested Medications      Name from pharmacy: [facility] Sandoval         Will file in chart as: METHOCARBAMOL 750 MG Oral Tab    Sig: TAKE 1 TABLET(750 MG) BY MOUTH THREE TIMES DAILY AS NEEDED.  MAY CAUSE DROWSINESS    Disp:  60 tablet    Refills:

## 2019-10-03 ENCOUNTER — OFFICE VISIT (OUTPATIENT)
Dept: SURGERY | Facility: CLINIC | Age: 39
End: 2019-10-03
Payer: COMMERCIAL

## 2019-10-03 VITALS
BODY MASS INDEX: 32.14 KG/M2 | HEART RATE: 82 BPM | DIASTOLIC BLOOD PRESSURE: 80 MMHG | WEIGHT: 200 LBS | HEIGHT: 66 IN | SYSTOLIC BLOOD PRESSURE: 114 MMHG

## 2019-10-03 DIAGNOSIS — R20.2 NUMBNESS AND TINGLING OF BOTH FEET: ICD-10-CM

## 2019-10-03 DIAGNOSIS — R20.0 NUMBNESS AND TINGLING IN LEFT HAND: ICD-10-CM

## 2019-10-03 DIAGNOSIS — R20.2 NUMBNESS AND TINGLING IN LEFT HAND: ICD-10-CM

## 2019-10-03 DIAGNOSIS — M54.12 CERVICAL RADICULOPATHY: Primary | ICD-10-CM

## 2019-10-03 DIAGNOSIS — R20.0 NUMBNESS AND TINGLING OF BOTH FEET: ICD-10-CM

## 2019-10-03 DIAGNOSIS — M79.602 LEFT ARM PAIN: ICD-10-CM

## 2019-10-03 DIAGNOSIS — M48.02 FORAMINAL STENOSIS OF CERVICAL REGION: ICD-10-CM

## 2019-10-03 DIAGNOSIS — R22.1 NODULE OF NECK: ICD-10-CM

## 2019-10-03 DIAGNOSIS — M50.20 CERVICAL DISC HERNIATION: ICD-10-CM

## 2019-10-03 DIAGNOSIS — M50.30 DDD (DEGENERATIVE DISC DISEASE), CERVICAL: ICD-10-CM

## 2019-10-03 PROCEDURE — 99214 OFFICE O/P EST MOD 30 MIN: CPT | Performed by: PHYSICIAN ASSISTANT

## 2019-10-03 NOTE — PROGRESS NOTES
Patient last seen 2/18/19 with 8 week follow-up recommendation. Feels no different than she did in February. Patient states that she did the three injections and they did nothing. Tried wall glides and traction.  Traction only feels good until she takes it

## 2019-10-10 ENCOUNTER — OFFICE VISIT (OUTPATIENT)
Dept: FAMILY MEDICINE CLINIC | Facility: CLINIC | Age: 39
End: 2019-10-10
Payer: COMMERCIAL

## 2019-10-10 VITALS
HEART RATE: 88 BPM | TEMPERATURE: 98 F | WEIGHT: 219 LBS | BODY MASS INDEX: 36.05 KG/M2 | DIASTOLIC BLOOD PRESSURE: 80 MMHG | OXYGEN SATURATION: 99 % | SYSTOLIC BLOOD PRESSURE: 122 MMHG | RESPIRATION RATE: 16 BRPM | HEIGHT: 65.5 IN

## 2019-10-10 DIAGNOSIS — M25.561 ACUTE PAIN OF RIGHT KNEE: Primary | ICD-10-CM

## 2019-10-10 DIAGNOSIS — E53.8 VITAMIN B12 DEFICIENCY: ICD-10-CM

## 2019-10-10 DIAGNOSIS — M54.12 CERVICAL RADICULOPATHY: ICD-10-CM

## 2019-10-10 DIAGNOSIS — M54.16 LUMBAR RADICULOPATHY: ICD-10-CM

## 2019-10-10 PROCEDURE — 99214 OFFICE O/P EST MOD 30 MIN: CPT | Performed by: PHYSICIAN ASSISTANT

## 2019-10-10 PROCEDURE — 96372 THER/PROPH/DIAG INJ SC/IM: CPT | Performed by: PHYSICIAN ASSISTANT

## 2019-10-10 RX ORDER — NAPROXEN 500 MG/1
500 TABLET ORAL 2 TIMES DAILY WITH MEALS
Qty: 30 TABLET | Refills: 0 | Status: SHIPPED | OUTPATIENT
Start: 2019-10-10 | End: 2019-10-20

## 2019-10-10 RX ORDER — CYANOCOBALAMIN 1000 UG/ML
1000 INJECTION INTRAMUSCULAR; SUBCUTANEOUS ONCE
Status: COMPLETED | OUTPATIENT
Start: 2019-10-10 | End: 2019-10-10

## 2019-10-10 RX ADMIN — CYANOCOBALAMIN 1000 MCG: 1000 INJECTION INTRAMUSCULAR; SUBCUTANEOUS at 09:57:00

## 2019-10-10 NOTE — PATIENT INSTRUCTIONS
Thank you for choosing Lelia Cuadra PA-C at [place] [address]  To Do: Luanne Cooney  1. Begin medications as prescribed  2. Start physical therapy  3. Referral for Ortho and chiropractor for neck and back  4.   Follow-up in 4 to 6 weeks sooner Once our office has called informing you that the insurance company approved your testing, please call Central Scheduling at [phone]  Please allow our office 5 business days to contact you regarding any testing results.     Refill policies:   Allow

## 2019-10-10 NOTE — PROGRESS NOTES
[facility] Internal Medicine Progress Note    CC:  Patient presents with:  Knee Pain: right knee x 2 weeks  Imm/Inj: B-12 #3      HPI:   HPI  Patient is a 40-year-old female here complaining of right knee pain x2 weeks.   Patient states that she d MAKE DROWSY Disp: 45 tablet Rfl: 0   Venlafaxine HCl ER 75 MG Oral Capsule SR 24 Hr TAKE 1 CAPSULE(75 MG) BY MOUTH DAILY Disp: 90 capsule Rfl: 3   Venlafaxine HCl  MG Oral Capsule SR 24 Hr Take 1 capsule (150 mg total) by mouth once daily.  Disp: 90 c tenderness noted. No LCL and no patellar tendon tenderness noted. Neurological: She is alert and oriented to person, place, and time. Skin: Skin is warm and dry. Psychiatric: Mood and affect normal.   Vitals reviewed.         Assessment and Plan:  Acu

## 2019-10-19 NOTE — TELEPHONE ENCOUNTER
Requested Prescriptions     Pending Prescriptions Disp Refills   • GABAPENTIN 300 MG Oral Cap [Pharmacy Med Name: GABAPENTIN 300MG CAPSULES] 90 capsule 0     Sig: TAKE 1 CAPSULE(300 MG) BY MOUTH EVERY NIGHT. START WITH EVERY DAY, CAN.  INCREASE TO 2 TIMES D

## 2019-10-20 ENCOUNTER — PATIENT MESSAGE (OUTPATIENT)
Dept: FAMILY MEDICINE CLINIC | Facility: CLINIC | Age: 39
End: 2019-10-20

## 2019-10-20 RX ORDER — GABAPENTIN 300 MG/1
CAPSULE ORAL
Qty: 90 CAPSULE | Refills: 0 | Status: SHIPPED | OUTPATIENT
Start: 2019-10-20 | End: 2019-11-18

## 2019-10-21 RX ORDER — TEMAZEPAM 30 MG/1
30 CAPSULE ORAL NIGHTLY PRN
Qty: 30 CAPSULE | Refills: 5 | Status: SHIPPED | OUTPATIENT
Start: 2019-10-21 | End: 2020-04-17

## 2019-10-21 RX ORDER — LORAZEPAM 0.5 MG/1
TABLET ORAL
Qty: 2 TABLET | Refills: 0 | Status: SHIPPED | OUTPATIENT
Start: 2019-10-21 | End: 2019-12-30 | Stop reason: ALTCHOICE

## 2019-10-21 RX ORDER — NAPROXEN 500 MG/1
500 TABLET ORAL 2 TIMES DAILY WITH MEALS
Qty: 30 TABLET | Refills: 0 | Status: SHIPPED | OUTPATIENT
Start: 2019-10-21 | End: 2019-12-23

## 2019-10-21 NOTE — TELEPHONE ENCOUNTER
From: Jin Castellanos  To: Horald Mcardle, PA-C  Sent: 10/20/2019 11:17 AM CDT  Subject: Prescription Question    I have an MRI scheduled for Nov. 2 and it has to be done in a closed MRI machine instead of an open MRI.  I am claustrophobic and will need

## 2019-10-21 NOTE — TELEPHONE ENCOUNTER
Requested Prescriptions     Pending Prescriptions Disp Refills   • temazepam 30 MG Oral Cap 30 capsule 5     Sig: Take 1 capsule (30 mg total) by mouth nightly as needed for Sleep.    • naproxen 500 MG Oral Tab 30 tablet 0     Sig: Take 1 tablet (500 mg tot

## 2019-10-21 NOTE — PROGRESS NOTES
Lorazepam 0.5mg 1 tab 30 min prior to procedure, if needed can take another one. Will have to have someone drive her there and home due to drowsiness.   Script in triage bin

## 2019-11-02 ENCOUNTER — HOSPITAL ENCOUNTER (OUTPATIENT)
Dept: MRI IMAGING | Age: 39
Discharge: HOME | End: 2019-11-02
Attending: PHYSICIAN ASSISTANT
Payer: COMMERCIAL

## 2019-11-02 DIAGNOSIS — M50.20 CERVICAL DISC HERNIATION: ICD-10-CM

## 2019-11-02 DIAGNOSIS — R20.0 NUMBNESS AND TINGLING IN LEFT HAND: ICD-10-CM

## 2019-11-02 DIAGNOSIS — M54.12 CERVICAL RADICULOPATHY: ICD-10-CM

## 2019-11-02 DIAGNOSIS — M50.30 DDD (DEGENERATIVE DISC DISEASE), CERVICAL: ICD-10-CM

## 2019-11-02 DIAGNOSIS — M79.602 LEFT ARM PAIN: ICD-10-CM

## 2019-11-02 DIAGNOSIS — R20.2 NUMBNESS AND TINGLING OF BOTH FEET: ICD-10-CM

## 2019-11-02 DIAGNOSIS — M48.02 FORAMINAL STENOSIS OF CERVICAL REGION: ICD-10-CM

## 2019-11-02 DIAGNOSIS — R22.1 NODULE OF NECK: ICD-10-CM

## 2019-11-02 DIAGNOSIS — R20.2 NUMBNESS AND TINGLING IN LEFT HAND: ICD-10-CM

## 2019-11-02 DIAGNOSIS — R20.0 NUMBNESS AND TINGLING OF BOTH FEET: ICD-10-CM

## 2019-11-02 PROCEDURE — 73218 MRI UPPER EXTREMITY W/O DYE: CPT | Performed by: PHYSICIAN ASSISTANT

## 2019-11-02 PROCEDURE — 72141 MRI NECK SPINE W/O DYE: CPT | Performed by: PHYSICIAN ASSISTANT

## 2019-11-12 ENCOUNTER — PATIENT MESSAGE (OUTPATIENT)
Dept: SURGERY | Facility: CLINIC | Age: 39
End: 2019-11-12

## 2019-11-12 ENCOUNTER — OFFICE VISIT (OUTPATIENT)
Dept: SURGERY | Facility: CLINIC | Age: 39
End: 2019-11-12
Payer: COMMERCIAL

## 2019-11-12 VITALS — DIASTOLIC BLOOD PRESSURE: 78 MMHG | SYSTOLIC BLOOD PRESSURE: 114 MMHG | HEART RATE: 80 BPM

## 2019-11-12 DIAGNOSIS — R20.2 NUMBNESS AND TINGLING OF BOTH FEET: ICD-10-CM

## 2019-11-12 DIAGNOSIS — M50.20 CERVICAL DISC HERNIATION: ICD-10-CM

## 2019-11-12 DIAGNOSIS — R20.0 NUMBNESS AND TINGLING IN LEFT HAND: ICD-10-CM

## 2019-11-12 DIAGNOSIS — M48.02 FORAMINAL STENOSIS OF CERVICAL REGION: ICD-10-CM

## 2019-11-12 DIAGNOSIS — M54.12 CERVICAL RADICULOPATHY: Primary | ICD-10-CM

## 2019-11-12 DIAGNOSIS — M48.02 CERVICAL STENOSIS OF SPINAL CANAL: ICD-10-CM

## 2019-11-12 DIAGNOSIS — R20.0 NUMBNESS AND TINGLING OF BOTH FEET: ICD-10-CM

## 2019-11-12 DIAGNOSIS — R20.2 NUMBNESS AND TINGLING IN LEFT HAND: ICD-10-CM

## 2019-11-12 DIAGNOSIS — M50.30 DDD (DEGENERATIVE DISC DISEASE), CERVICAL: ICD-10-CM

## 2019-11-12 PROCEDURE — 99213 OFFICE O/P EST LOW 20 MIN: CPT | Performed by: PHYSICIAN ASSISTANT

## 2019-11-12 NOTE — TELEPHONE ENCOUNTER
Hi Mrs. Schultz Starla,    Very reasonable, as you did not have relief prior. Let's continue with the rest of the plan as discussed. We'll see you for follow up in December with Dr. Luciana Dang. Please let me know if you have any further questions/concerns.      Have a gr

## 2019-11-12 NOTE — PROGRESS NOTES
Patient: Kvng Ardon  Medical Record Number: [id number]  YOB: 1980  PCP: Hortencia Zendejas MD    Reason for visit: Cervical follow up    HISTORY OF CHIEF COMPLAINT:    Kvng Ardon is a very pleasant 44year old female presenting for give her some relief.     The last 6 months she started developing neck pain having increasing spasms in her back and is in this capacity that she is here.     She complains of frontal headache which is pretty constant tinnitus.  She complains of right ear 4/23/2019    Performed by Ora Pope MD at [address] [facility] INJECTION N/A 3/19/2019    Performed by Ora Pope MD at [facility] [facility] INJECTION N/A 3/12/2019    Performed by Ora Pope 6 HOURS AS NEEDED FOR PAIN.  DO NOT TAKE AND DRIVE, WILL MAKE DROWSY 45 tablet 0   • Venlafaxine HCl ER 75 MG Oral Capsule SR 24 Hr TAKE 1 CAPSULE(75 MG) BY MOUTH DAILY 90 capsule 3   • Venlafaxine HCl  MG Oral Capsule SR 24 Hr Take 1 capsule (150 mg left arm. FINDINGS:  Exam is mildly degraded by motion. CERVICAL DISC LEVELS:  C2-C3:  No significant disc/facet abnormality, spinal stenosis, or foraminal narrowing.   C3-C4:  Slight asymmetric disc bulge lateralizing to the right without central c at 11:51         PROCEDURE:  MRI BRACHIAL PLEXUS SH(CPT=73218)  COMPARISON:  [place], US, US HEAD/NECK (JAX=25018), 11/08/2018, 9:57. INDICATIONS:  R22.1 Localized swelling, mass and lump, neck M79.602 Pain in left arm R20.2 Paresthesia of skin R20. spine:     - Assess for subluxation  3. Activity:    - PT, add cervical traction. Consider home traction device if improvement. 4. Pain management:   - No relief with DAR's earlier this year. Main complaint is left cervical radiculopathy.  Please conside

## 2019-11-12 NOTE — PROGRESS NOTES
Imaging done on 11/2/19  MRI spine cervical completed. Patient states she has numbness and tingling in fingers in both hands, when looking down shoots on left side. Pain in left neck. 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

## 2019-11-12 NOTE — TELEPHONE ENCOUNTER
From: Shady Keen  To:  Ghanshyam Bolivar PA-C  Sent: 11/12/2019 9:23 AM CST  Subject: Visit Follow-up Question    [place],    My  and I discussed the injection and have decided to decline it as we haven't had success in it the last time and hav

## 2019-11-13 ENCOUNTER — OFFICE VISIT (OUTPATIENT)
Dept: PHYSICAL THERAPY | Age: 39
End: 2019-11-13
Attending: PHYSICIAN ASSISTANT
Payer: COMMERCIAL

## 2019-11-13 DIAGNOSIS — M25.561 ACUTE PAIN OF RIGHT KNEE: ICD-10-CM

## 2019-11-13 PROCEDURE — 97110 THERAPEUTIC EXERCISES: CPT | Performed by: PHYSICAL THERAPIST

## 2019-11-13 PROCEDURE — 97140 MANUAL THERAPY 1/> REGIONS: CPT | Performed by: PHYSICAL THERAPIST

## 2019-11-13 PROCEDURE — 97161 PT EVAL LOW COMPLEX 20 MIN: CPT | Performed by: PHYSICAL THERAPIST

## 2019-11-13 NOTE — PROGRESS NOTES
SPINE EVALUATION:   Referring Physician: Dr. Alexandra Poole  Diagnosis: cervical pain, stenosis, DDD, radiculopathy Date of Service: 11/13/2019     PATIENT SUMMARY   Jody Redd is a 44year old female who presents to therapy today with complaints of neck Numbness and tingling of hand     Heaviness of upper extremity     Umbilical hernia     Primary insomnia     Anxiety and depression     Hemorrhagic cyst of ovary    Pt denies diplopia, dysarthria, dysphasia, dizziness, drop attacks, bowel/bladder changes relief with distraction and pain with radicular symptoms with compression    Cervical AROM: (* denotes performed with pain)  Flexion: 32*  Extension: 29*  Sidebending: R 25*; L 25*  Rotation: R 60*; L 40*    Palpation: max tightness along L UT, LS, scalene thoracic PA mobility to WNL to improve cervical ROM as well as promote upright 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

## 2019-11-18 ENCOUNTER — OFFICE VISIT (OUTPATIENT)
Dept: PHYSICAL THERAPY | Age: 39
End: 2019-11-18
Attending: PHYSICIAN ASSISTANT
Payer: COMMERCIAL

## 2019-11-18 DIAGNOSIS — M25.561 ACUTE PAIN OF RIGHT KNEE: ICD-10-CM

## 2019-11-18 PROCEDURE — 97110 THERAPEUTIC EXERCISES: CPT | Performed by: PHYSICAL THERAPIST

## 2019-11-18 PROCEDURE — 97140 MANUAL THERAPY 1/> REGIONS: CPT | Performed by: PHYSICAL THERAPIST

## 2019-11-18 NOTE — PROGRESS NOTES
Dx: cervical pain, stenosis, DDD, radiculopathy         Authorized # of Visits:  13         Next MD visit: none scheduled  Fall Risk: low        Precautions: NA             Subjective:   Pt reports that she did receive a call from vendor regarding the TENs putting dishes into overhead cabinets   · Pt will improve cervical AROM rotation to >10 degrees to improve tolerance for turning head to check blind spot while driving  · Pt will have improved thoracic PA mobility to WNL to improve cervical ROM as well as

## 2019-11-19 RX ORDER — GABAPENTIN 300 MG/1
CAPSULE ORAL
Qty: 90 CAPSULE | Refills: 0 | Status: SHIPPED | OUTPATIENT
Start: 2019-11-19 | End: 2020-01-17

## 2019-11-19 NOTE — TELEPHONE ENCOUNTER
Requested Prescriptions     Pending Prescriptions Disp Refills   • GABAPENTIN 300 MG Oral Cap [Pharmacy Med Name: GABAPENTIN 300MG CAPSULES] 90 capsule 0     Sig: TAKE ONE CAPSULE BY MOUTH EVERY NIGHT; START WITH EVERY NIGHT, CAN INCREASE TO TWICE DAILY AS

## 2019-11-20 ENCOUNTER — PATIENT MESSAGE (OUTPATIENT)
Dept: FAMILY MEDICINE CLINIC | Facility: CLINIC | Age: 39
End: 2019-11-20

## 2019-11-20 NOTE — TELEPHONE ENCOUNTER
From: Ga Benton  To: Casie Perez PA-C  Sent: 11/20/2019 8:24 AM CST  Subject: Prescription Question    Should I continue to get the B12 injections? I can't remember how long I was supposed to get them for?   Thank you,  Clare Delong

## 2019-11-21 ENCOUNTER — OFFICE VISIT (OUTPATIENT)
Dept: PHYSICAL THERAPY | Age: 39
End: 2019-11-21
Attending: PHYSICIAN ASSISTANT
Payer: COMMERCIAL

## 2019-11-21 DIAGNOSIS — M25.561 ACUTE PAIN OF RIGHT KNEE: ICD-10-CM

## 2019-11-21 PROCEDURE — 97110 THERAPEUTIC EXERCISES: CPT | Performed by: PHYSICAL THERAPIST

## 2019-11-21 PROCEDURE — 97014 ELECTRIC STIMULATION THERAPY: CPT | Performed by: PHYSICAL THERAPIST

## 2019-11-21 PROCEDURE — 97140 MANUAL THERAPY 1/> REGIONS: CPT | Performed by: PHYSICAL THERAPIST

## 2019-11-21 NOTE — PROGRESS NOTES
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

## 2019-11-22 ENCOUNTER — TELEPHONE (OUTPATIENT)
Dept: FAMILY MEDICINE CLINIC | Facility: CLINIC | Age: 39
End: 2019-11-22

## 2019-11-22 NOTE — TELEPHONE ENCOUNTER
[employer], spoke to pharmacist, they do have refills on Temazepam and will get the refill ready. LMOM for pt with above information including the [facility] address.

## 2019-11-22 NOTE — TELEPHONE ENCOUNTER
Patient called the pharmacy to refill her temazepam. She states pharmacy told her she has no refills  However on 10/21/19- 30 day supply with 5 refills was sent    temazepam 30 MG Oral Cap 30 capsule 5 10/21/2019     Sig - Route:  Take 1 capsule (30 mg tota

## 2019-11-24 RX ORDER — NAPROXEN 500 MG/1
500 TABLET ORAL 2 TIMES DAILY WITH MEALS
Qty: 30 TABLET | Refills: 0 | OUTPATIENT
Start: 2019-11-24

## 2019-11-24 RX ORDER — TEMAZEPAM 30 MG/1
30 CAPSULE ORAL NIGHTLY PRN
Qty: 30 CAPSULE | Refills: 5 | OUTPATIENT
Start: 2019-11-24

## 2019-11-25 ENCOUNTER — OFFICE VISIT (OUTPATIENT)
Dept: PHYSICAL THERAPY | Age: 39
End: 2019-11-25
Attending: PHYSICIAN ASSISTANT
Payer: COMMERCIAL

## 2019-11-25 DIAGNOSIS — M25.561 ACUTE PAIN OF RIGHT KNEE: ICD-10-CM

## 2019-11-25 PROCEDURE — 97140 MANUAL THERAPY 1/> REGIONS: CPT | Performed by: PHYSICAL THERAPIST

## 2019-11-25 PROCEDURE — 97012 MECHANICAL TRACTION THERAPY: CPT | Performed by: PHYSICAL THERAPIST

## 2019-11-25 RX ORDER — ACETAMINOPHEN AND CODEINE PHOSPHATE 300; 30 MG/1; MG/1
1 TABLET ORAL EVERY 6 HOURS PRN
Qty: 45 TABLET | Refills: 0 | Status: SHIPPED | OUTPATIENT
Start: 2019-11-25 | End: 2020-01-21

## 2019-11-25 NOTE — TELEPHONE ENCOUNTER
Requesting Acetaminophen-Codeine #3 300-30 MG Oral Tab  LOV: 10/10/19  RTC: 4-6 weeks  Last Relevant Labs:   Filled: 9/24/19 #45 with 0 refills    Future Appointments   Date Time Provider Emma Plummer   11/25/2019 10:00 AM Nicolasa BROWN PT [place]

## 2019-11-25 NOTE — TELEPHONE ENCOUNTER
Faxed RX for Tylenol #3 approved per Dr. Jones Gilford to [employer] [phone] with confirmation received. Task completed.

## 2019-11-27 ENCOUNTER — OFFICE VISIT (OUTPATIENT)
Dept: PHYSICAL THERAPY | Age: 39
End: 2019-11-27
Attending: PHYSICIAN ASSISTANT
Payer: COMMERCIAL

## 2019-11-27 ENCOUNTER — APPOINTMENT (OUTPATIENT)
Dept: PHYSICAL THERAPY | Age: 39
End: 2019-11-27
Attending: PHYSICIAN ASSISTANT
Payer: COMMERCIAL

## 2019-11-27 PROCEDURE — 97012 MECHANICAL TRACTION THERAPY: CPT | Performed by: PHYSICAL THERAPIST

## 2019-11-27 PROCEDURE — 97140 MANUAL THERAPY 1/> REGIONS: CPT | Performed by: PHYSICAL THERAPIST

## 2019-11-27 NOTE — PROGRESS NOTES
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    Date

## 2019-12-02 ENCOUNTER — HOSPITAL ENCOUNTER (OUTPATIENT)
Dept: GENERAL RADIOLOGY | Age: 39
Discharge: HOME | End: 2019-12-02
Attending: PHYSICIAN ASSISTANT
Payer: COMMERCIAL

## 2019-12-02 ENCOUNTER — OFFICE VISIT (OUTPATIENT)
Dept: PHYSICAL THERAPY | Age: 39
End: 2019-12-02
Attending: PHYSICIAN ASSISTANT
Payer: COMMERCIAL

## 2019-12-02 DIAGNOSIS — R20.0 NUMBNESS AND TINGLING IN LEFT HAND: ICD-10-CM

## 2019-12-02 DIAGNOSIS — M25.561 ACUTE PAIN OF RIGHT KNEE: ICD-10-CM

## 2019-12-02 DIAGNOSIS — R20.2 NUMBNESS AND TINGLING OF BOTH FEET: ICD-10-CM

## 2019-12-02 DIAGNOSIS — R20.0 NUMBNESS AND TINGLING OF BOTH FEET: ICD-10-CM

## 2019-12-02 DIAGNOSIS — M50.30 DDD (DEGENERATIVE DISC DISEASE), CERVICAL: ICD-10-CM

## 2019-12-02 DIAGNOSIS — M48.02 FORAMINAL STENOSIS OF CERVICAL REGION: ICD-10-CM

## 2019-12-02 DIAGNOSIS — M50.20 CERVICAL DISC HERNIATION: ICD-10-CM

## 2019-12-02 DIAGNOSIS — M48.02 CERVICAL STENOSIS OF SPINAL CANAL: ICD-10-CM

## 2019-12-02 DIAGNOSIS — M54.12 CERVICAL RADICULOPATHY: ICD-10-CM

## 2019-12-02 DIAGNOSIS — R20.2 NUMBNESS AND TINGLING IN LEFT HAND: ICD-10-CM

## 2019-12-02 PROCEDURE — 72050 X-RAY EXAM NECK SPINE 4/5VWS: CPT | Performed by: PHYSICIAN ASSISTANT

## 2019-12-02 PROCEDURE — 97140 MANUAL THERAPY 1/> REGIONS: CPT | Performed by: PHYSICAL THERAPIST

## 2019-12-04 ENCOUNTER — OFFICE VISIT (OUTPATIENT)
Dept: PHYSICAL THERAPY | Age: 39
End: 2019-12-04
Attending: PHYSICIAN ASSISTANT
Payer: COMMERCIAL

## 2019-12-04 DIAGNOSIS — M25.561 ACUTE PAIN OF RIGHT KNEE: ICD-10-CM

## 2019-12-04 PROCEDURE — 97140 MANUAL THERAPY 1/> REGIONS: CPT | Performed by: PHYSICAL THERAPIST

## 2019-12-04 NOTE — PROGRESS NOTES
Dx: cervical pain, stenosis, DDD, radiculopathy         Authorized # of Visits:  13         Next MD visit: neurosurgeon Amanda Dec 5th  Fall Risk: low        Precautions: NA             Subjective:   Pt reports that she did get some relief from application degrees to improve tolerance for looking down to tie shoes   · Pt will improve cervical AROM extension by 10 degrees to improve tolerance for putting dishes into overhead cabinets   · Pt will improve cervical AROM rotation to >10 degrees to improve toleran STM UT, LS, SB stretch  Manual: (50 mins)  Myofascial release with myobuddy to UT, LS and thoracic region. Suboccipital release, STM UT, LS, SB stretch   Leukotape to L UE. Manual: (60 mins)  Myofascial release with myobuddy to UT, LS and thoracic region.

## 2019-12-05 ENCOUNTER — TELEPHONE (OUTPATIENT)
Dept: SURGERY | Facility: CLINIC | Age: 39
End: 2019-12-05

## 2019-12-05 ENCOUNTER — OFFICE VISIT (OUTPATIENT)
Dept: SURGERY | Facility: CLINIC | Age: 39
End: 2019-12-05
Payer: COMMERCIAL

## 2019-12-05 VITALS — DIASTOLIC BLOOD PRESSURE: 80 MMHG | HEART RATE: 80 BPM | SYSTOLIC BLOOD PRESSURE: 138 MMHG

## 2019-12-05 DIAGNOSIS — M48.02 FORAMINAL STENOSIS OF CERVICAL REGION: ICD-10-CM

## 2019-12-05 DIAGNOSIS — M50.30 DDD (DEGENERATIVE DISC DISEASE), CERVICAL: Primary | ICD-10-CM

## 2019-12-05 DIAGNOSIS — R29.898 WEAKNESS OF BOTH UPPER EXTREMITIES: ICD-10-CM

## 2019-12-05 DIAGNOSIS — R29.898 WEAKNESS OF BOTH LOWER EXTREMITIES: ICD-10-CM

## 2019-12-05 DIAGNOSIS — R20.0 NUMBNESS AND TINGLING IN LEFT HAND: ICD-10-CM

## 2019-12-05 DIAGNOSIS — R20.2 NUMBNESS AND TINGLING IN LEFT HAND: ICD-10-CM

## 2019-12-05 DIAGNOSIS — R20.0 NUMBNESS AND TINGLING OF BOTH FEET: ICD-10-CM

## 2019-12-05 DIAGNOSIS — M50.20 CERVICAL DISC HERNIATION: ICD-10-CM

## 2019-12-05 DIAGNOSIS — M54.12 CERVICAL RADICULOPATHY: ICD-10-CM

## 2019-12-05 DIAGNOSIS — R20.2 NUMBNESS AND TINGLING OF BOTH FEET: ICD-10-CM

## 2019-12-05 DIAGNOSIS — M48.02 CERVICAL STENOSIS OF SPINAL CANAL: ICD-10-CM

## 2019-12-05 PROCEDURE — 99214 OFFICE O/P EST MOD 30 MIN: CPT | Performed by: PHYSICIAN ASSISTANT

## 2019-12-05 NOTE — TELEPHONE ENCOUNTER
PCP clearance letter sent to BRITNI Paredes at fax number:    [phone]    PCP clearance letter sent to Dr. Desirae Bah at fax number: [phone]    In surgery scheduling meeting, patient states she sees BRITNI Paredes for her PCP needs.

## 2019-12-05 NOTE — TELEPHONE ENCOUNTER
You are scheduled for C5-6 ACDF spinal fusion on 2/10/20 with Landon Perez. Pre-op instructions discussed with patient and surgical packet provided:    · You will need to contact the Pre-admission department at [phone] to schedule your pre-op testing. there are any questions regarding your collar you may reach [employer] at phone #: [phone]. · You may need an external bone growth stimulator. If ordered for your surgery SEGUNDO Diaz will contact you either before or after your surgery.  For any ques

## 2019-12-05 NOTE — PROGRESS NOTES
Pt is here for follow up. Xray of the cervical: Obtained     PT: Pt states that she has been doing PT 2 times weekly. Pt states that she is unsure if PT has been helping her. Pt states that she still the same since LOV.  No new symptome to be repo

## 2019-12-05 NOTE — PROGRESS NOTES
[facility] Neurosurgery follow-up      HISTORY OF PRESENT ILLNESS:Parisa Ralph is a 44year old LH female returns in follow-up. CS pain right sided neck swelling. N/T in arms and hands, but not the legs. Left rotation and flexion left arm shooting pain.  No She complains of frontal headache which is pretty constant tinnitus. She complains of right ear pain or pressure which he has been worked up for. No clear etiology at this time.   She denies double vision loss of vision numbness in the face difficulty swa Performed by Star Ortiz MD at [address] [place] [address] STEROID INJECTION N/A 3/12/2019    Performed by Star Ortiz MD at [facility] ENDOSCOPY   • OTHER Right 12/01/2017    thumb surgery    • OTHER SURGICAL HISTORY  08/12/2016    Cystoscopy Lower extremity strength:     Iliopsoas  Hamstrings   Quads    D-flexion P-flexion Eversion   Right       5         5       5         5 5    Left       5         5       5         5- 5      Reflexes DTRs: last exam    Biceps   Brachioradialis   Triceps Risks and benefits were discussed at length. To included expected outcome from surgery, unexpected outcomes from surgery, and associated risks with any surgical procedure.  To include infection, neurological injury, failure to resolve pain or symptoms, and

## 2019-12-05 NOTE — PATIENT INSTRUCTIONS
Refill policies:    • Allow 2-3 business days for refills; controlled substances may take longer.   • Contact your pharmacy at least 5 days prior to running out of medication and have them send an electronic request or submit request through the “request re Depending on your insurance carrier, approval may take 3-10 days. It is highly recommended patients contact their insurance carrier directly to determine coverage.   If test is done without insurance authorization, patient may be responsible for the entire patient and surgical packet provided:    · You will need to contact the Pre-admission department at [phone] to schedule your pre-op testing. They will get you scheduled for all the blood work, chest xray and EKG if needed.  You will be informed about an external bone growth stimulator. If ordered for your surgery SEGUNDO rodriguez- Latrice Miller will contact you either before or after your surgery. For any questions you may reach Latrice Miller at phone #: [phone].   · You may also need SCD's (Sequential Compression Device) to

## 2019-12-10 ENCOUNTER — APPOINTMENT (OUTPATIENT)
Dept: PHYSICAL THERAPY | Age: 39
End: 2019-12-10
Attending: PHYSICIAN ASSISTANT
Payer: COMMERCIAL

## 2019-12-12 ENCOUNTER — APPOINTMENT (OUTPATIENT)
Dept: PHYSICAL THERAPY | Age: 39
End: 2019-12-12
Attending: PHYSICIAN ASSISTANT
Payer: COMMERCIAL

## 2019-12-21 DIAGNOSIS — M54.50 LUMBAR PAIN: ICD-10-CM

## 2019-12-23 RX ORDER — NAPROXEN 500 MG/1
TABLET ORAL
Qty: 30 TABLET | Refills: 0 | Status: SHIPPED | OUTPATIENT
Start: 2019-12-23 | End: 2020-02-05 | Stop reason: ALTCHOICE

## 2019-12-23 RX ORDER — METHOCARBAMOL 750 MG/1
TABLET, FILM COATED ORAL
Qty: 60 TABLET | Refills: 1 | Status: ON HOLD | OUTPATIENT
Start: 2019-12-23 | End: 2020-02-07

## 2019-12-23 NOTE — TELEPHONE ENCOUNTER
Requesting METHOCARBAMOL 750 MG   LOV: 10/10/19  RTC: 3 months  Last Relevant Labs: 8/1/19  Filled:  9/24/19 # 60 with 0 refills    Future Appointments   Date Time Provider Emma Plummer   1/13/2020  8:30 AM Yeimi Bermudez MD [facility] [address] [facility] [address]   2/

## 2019-12-23 NOTE — TELEPHONE ENCOUNTER
Requested Prescriptions     Pending Prescriptions Disp Refills   • NAPROXEN 500 MG Oral Tab [Pharmacy Med Name: NAPROXEN 500MG TABLETS] 30 tablet 0     Sig: TAKE 1 TABLET(500 MG) BY MOUTH TWICE DAILY WITH MEALS     Non protocol medication    LOV: 10/10/201

## 2020-01-02 NOTE — TELEPHONE ENCOUNTER
Prior authorization request completed for: C5-6 ACDF Surgery  Authorization #No Prior Authorization/Nor Pre Determination is Required     Spoke with Nolan at [address] [place] [address] ([address] [place] [address]) [phone]  Reference #:[id number]     Time:08:51    Spoke with Ziyad Seo at [facility]

## 2020-01-13 ENCOUNTER — OFFICE VISIT (OUTPATIENT)
Dept: FAMILY MEDICINE CLINIC | Facility: CLINIC | Age: 40
End: 2020-01-13
Payer: COMMERCIAL

## 2020-01-13 ENCOUNTER — LAB ENCOUNTER (OUTPATIENT)
Dept: LAB | Age: 40
End: 2020-01-13
Attending: FAMILY MEDICINE
Payer: COMMERCIAL

## 2020-01-13 VITALS
HEART RATE: 86 BPM | DIASTOLIC BLOOD PRESSURE: 80 MMHG | TEMPERATURE: 98 F | SYSTOLIC BLOOD PRESSURE: 120 MMHG | BODY MASS INDEX: 36 KG/M2 | RESPIRATION RATE: 16 BRPM | HEIGHT: 66 IN | WEIGHT: 224 LBS

## 2020-01-13 DIAGNOSIS — M50.30 DDD (DEGENERATIVE DISC DISEASE), CERVICAL: ICD-10-CM

## 2020-01-13 DIAGNOSIS — M50.20 CERVICAL DISC HERNIATION: ICD-10-CM

## 2020-01-13 DIAGNOSIS — M54.12 CERVICAL RADICULOPATHY: ICD-10-CM

## 2020-01-13 DIAGNOSIS — Z01.818 PREOPERATIVE CLEARANCE: Primary | ICD-10-CM

## 2020-01-13 DIAGNOSIS — M48.02 CERVICAL STENOSIS OF SPINAL CANAL: ICD-10-CM

## 2020-01-13 LAB
ALBUMIN SERPL-MCNC: 3.9 G/DL (ref 3.4–5)
ALBUMIN/GLOB SERPL: 1.1 {RATIO} (ref 1–2)
ALP LIVER SERPL-CCNC: 90 U/L (ref 37–98)
ALT SERPL-CCNC: 24 U/L (ref 13–56)
ANION GAP SERPL CALC-SCNC: 7 MMOL/L (ref 0–18)
ANTIBODY SCREEN: NEGATIVE
APTT PPP: 30.6 SECONDS (ref 25.4–36.1)
AST SERPL-CCNC: 12 U/L (ref 15–37)
BASOPHILS # BLD AUTO: 0.07 X10(3) UL (ref 0–0.2)
BASOPHILS NFR BLD AUTO: 0.9 %
BILIRUB SERPL-MCNC: 0.5 MG/DL (ref 0.1–2)
BUN BLD-MCNC: 21 MG/DL (ref 7–18)
BUN/CREAT SERPL: 24.1 (ref 10–20)
CALCIUM BLD-MCNC: 9 MG/DL (ref 8.5–10.1)
CHLORIDE SERPL-SCNC: 107 MMOL/L (ref 98–112)
CO2 SERPL-SCNC: 24 MMOL/L (ref 21–32)
CREAT BLD-MCNC: 0.87 MG/DL (ref 0.55–1.02)
DEPRECATED RDW RBC AUTO: 42.5 FL (ref 35.1–46.3)
EOSINOPHIL # BLD AUTO: 0.07 X10(3) UL (ref 0–0.7)
EOSINOPHIL NFR BLD AUTO: 0.9 %
ERYTHROCYTE [DISTWIDTH] IN BLOOD BY AUTOMATED COUNT: 12.6 % (ref 11–15)
GLOBULIN PLAS-MCNC: 3.4 G/DL (ref 2.8–4.4)
GLUCOSE BLD-MCNC: 95 MG/DL (ref 70–99)
HCT VFR BLD AUTO: 41.3 % (ref 35–48)
HGB BLD-MCNC: 12.9 G/DL (ref 12–16)
IMM GRANULOCYTES # BLD AUTO: 0.03 X10(3) UL (ref 0–1)
IMM GRANULOCYTES NFR BLD: 0.4 %
INR BLD: 1.03 (ref 0.9–1.1)
LYMPHOCYTES # BLD AUTO: 2.25 X10(3) UL (ref 1–4)
LYMPHOCYTES NFR BLD AUTO: 28.4 %
M PROTEIN MFR SERPL ELPH: 7.3 G/DL (ref 6.4–8.2)
MCH RBC QN AUTO: 28.9 PG (ref 26–34)
MCHC RBC AUTO-ENTMCNC: 31.2 G/DL (ref 31–37)
MCV RBC AUTO: 92.4 FL (ref 80–100)
MONOCYTES # BLD AUTO: 0.5 X10(3) UL (ref 0.1–1)
MONOCYTES NFR BLD AUTO: 6.3 %
NEUTROPHILS # BLD AUTO: 5 X10 (3) UL (ref 1.5–7.7)
NEUTROPHILS # BLD AUTO: 5 X10(3) UL (ref 1.5–7.7)
NEUTROPHILS NFR BLD AUTO: 63.1 %
OSMOLALITY SERPL CALC.SUM OF ELEC: 289 MOSM/KG (ref 275–295)
PLATELET # BLD AUTO: 283 10(3)UL (ref 150–450)
POTASSIUM SERPL-SCNC: 4 MMOL/L (ref 3.5–5.1)
PSA SERPL DL<=0.01 NG/ML-MCNC: 13.9 SECONDS (ref 12.5–14.7)
RBC # BLD AUTO: 4.47 X10(6)UL (ref 3.8–5.3)
RH BLOOD TYPE: NEGATIVE
SODIUM SERPL-SCNC: 138 MMOL/L (ref 136–145)
WBC # BLD AUTO: 7.9 X10(3) UL (ref 4–11)

## 2020-01-13 PROCEDURE — 86900 BLOOD TYPING SEROLOGIC ABO: CPT | Performed by: NEUROLOGICAL SURGERY

## 2020-01-13 PROCEDURE — 86901 BLOOD TYPING SEROLOGIC RH(D): CPT | Performed by: NEUROLOGICAL SURGERY

## 2020-01-13 PROCEDURE — 36415 COLL VENOUS BLD VENIPUNCTURE: CPT | Performed by: NEUROLOGICAL SURGERY

## 2020-01-13 PROCEDURE — 85610 PROTHROMBIN TIME: CPT | Performed by: NEUROLOGICAL SURGERY

## 2020-01-13 PROCEDURE — 85025 COMPLETE CBC W/AUTO DIFF WBC: CPT | Performed by: NEUROLOGICAL SURGERY

## 2020-01-13 PROCEDURE — 85730 THROMBOPLASTIN TIME PARTIAL: CPT | Performed by: NEUROLOGICAL SURGERY

## 2020-01-13 PROCEDURE — 80053 COMPREHEN METABOLIC PANEL: CPT | Performed by: NEUROLOGICAL SURGERY

## 2020-01-13 PROCEDURE — 86850 RBC ANTIBODY SCREEN: CPT | Performed by: NEUROLOGICAL SURGERY

## 2020-01-13 PROCEDURE — 99214 OFFICE O/P EST MOD 30 MIN: CPT | Performed by: FAMILY MEDICINE

## 2020-01-13 PROCEDURE — 87081 CULTURE SCREEN ONLY: CPT | Performed by: NEUROLOGICAL SURGERY

## 2020-01-13 NOTE — H&P
Preoperative History and Physical Internal Medicine    CC: Patient presents with:  Pre-Op Exam: cervical disectomy/ Dr. Alan Dickerson is 44year old presenting for a preoperative exam.    This patient is having surgery on date: 2/10/20for pro SURGICAL HISTORY  08/12/2016    Cystoscopy and Stent Removal   • SACROILIAC JOINT INJECTION RIGHT OR LEFT Right 8/16/2018    Performed by Destiny Mackey MD at [address] [place] [address]   • TONSILLECTOMY     • TUBAL LIGATION         Social History:  Social History    T palpitations and leg swelling. Gastrointestinal: Negative for nausea, vomiting, abdominal pain, diarrhea and constipation. Genitourinary: Negative for difficulty urinating. Neurological: Positive for numbness.  Negative for dizziness, syncope, weaknes [employer] Expiration Date 08/27/2019 11/30/2019    • Urine Culture 08/27/2019 No Growth at 18-24 hrs. EKG done on 2/14/2019 which shows normal sinus rhythm and no ST-T changes.   Preop labs have been ordered but are still pending and will await results

## 2020-01-13 NOTE — PATIENT INSTRUCTIONS
Thank you for choosing Jeremiah Aktinson MD at [place] [address]  To Do: Christopher Cooney  1. Considerations in the Preoperative period:   Surgery is a big deal.  Anesthesia and your medicines and medical issues all stress out your body.   Therefore read a reuptake inhibitors like zoloft, effexor, paxil, prozac, citalopram, remeron etc. For mood should be held 3 weeks before surgery if high risk of bleeding as these may increase risk of bleeding  Most other psychiatric meds like antipsychotic meds, anxiety m done so.  All your results will post on there.  [email or web address]  • You can NOW use [employer] to book your appointments with us, or consider using open access scheduling which is through the [employer] website [email or web address] and type i please call Central Scheduling at [phone]  Please allow our office 5 business days to contact you regarding any testing results.     Refill policies:   Allow 3 business days for refills; controlled substances may take longer and must be picked up from

## 2020-01-14 NOTE — TELEPHONE ENCOUNTER
OV note per Dr. Kimberly Luna on 1/13/20:    \"Patient is medically cleared to undergo planned procedure. He is at low risk for developing perioperative and postoperative cardiac complications. Please contact my office if questions or concerns.  Patient was ad

## 2020-01-17 RX ORDER — GABAPENTIN 300 MG/1
CAPSULE ORAL
Qty: 90 CAPSULE | Refills: 0 | Status: ON HOLD | OUTPATIENT
Start: 2020-01-17 | End: 2020-02-07

## 2020-01-17 NOTE — TELEPHONE ENCOUNTER
Requested Prescriptions     Pending Prescriptions Disp Refills   • GABAPENTIN 300 MG Oral Cap [Pharmacy Med Name: GABAPENTIN 300MG CAPSULES] 90 capsule 0     Sig: TAKE 1 CAPSULE BY MOUTH EVERY NIGHT. START WITH EVERY NIGHT, CAN.  INCREASE TO 2 TIMES DAILY A

## 2020-01-21 RX ORDER — ACETAMINOPHEN AND CODEINE PHOSPHATE 300; 30 MG/1; MG/1
TABLET ORAL
Qty: 45 TABLET | Refills: 0 | Status: ON HOLD | OUTPATIENT
Start: 2020-01-21 | End: 2020-02-08

## 2020-01-21 NOTE — TELEPHONE ENCOUNTER
Requesting Tylenol #3  LOV: 1/13/2020 for Pre op  RTC: prn  Last Relevant Labs:   Filled: 11/25/19 #45 with 0 refills    Future Appointments   Date Time Provider Emma Plummer   2/3/2020  8:00 AM Erendira TORREZ PA-C [facility] [address] [facility] [address]   2/27/202

## 2020-02-03 NOTE — TELEPHONE ENCOUNTER
Called patient's  who stated that patient is willing to change surgery date to 2/7/20, with estimated arrival time @[phone] per OR  for 1030 am surgery.

## 2020-02-03 NOTE — TELEPHONE ENCOUNTER
Called patient and LMTCB per HIPPA protocol to cell phone and work phone voicemails.       Called  Lisa Burrell and explained that provider is requesting that patient please consider moving surgery date to 2/7/20, as Dr. Murtaza Niño has a case that has longer t

## 2020-02-03 NOTE — TELEPHONE ENCOUNTER
Surgery change request sent to OR, surgery date changed in [facility] calendar, changed on nursing surgery sheet.

## 2020-02-06 ENCOUNTER — ANESTHESIA EVENT (OUTPATIENT)
Dept: SURGERY | Facility: HOSPITAL | Age: 40
End: 2020-02-06
Payer: COMMERCIAL

## 2020-02-06 NOTE — ANESTHESIA PREPROCEDURE EVALUATION
PRE-OP EVALUATION    Patient Name: Ric Puckett    Pre-op Diagnosis: DDD (degenerative disc disease), cervical [M50.30]  Foraminal stenosis of cervical region [M48.02]  Cervical disc herniation [M50.20]  Cervical radiculopathy [M54.12]  Numbness and ti 3  Montelukast Sodium (SINGULAIR) 10 MG Oral Tab, Take 1 tablet (10 mg total) by mouth daily. , Disp: 90 tablet, Rfl: 3        Allergies: Patient has no known allergies. Anesthesia Evaluation    Patient summary reviewed.     Anesthetic Complications 01/13/2020    GLU 95 01/13/2020    CA 9.0 01/13/2020            Airway      Mallampati: II       Cardiovascular    Cardiovascular exam normal.         Dental    No notable dental history.          Pulmonary    Pulmonary exam normal.                 Other fi

## 2020-02-07 ENCOUNTER — ANESTHESIA (OUTPATIENT)
Dept: SURGERY | Facility: HOSPITAL | Age: 40
End: 2020-02-07
Payer: COMMERCIAL

## 2020-02-07 ENCOUNTER — HOSPITAL ENCOUNTER (OUTPATIENT)
Facility: HOSPITAL | Age: 40
Discharge: HOME | End: 2020-02-08
Attending: NEUROLOGICAL SURGERY | Admitting: NEUROLOGICAL SURGERY
Payer: COMMERCIAL

## 2020-02-07 ENCOUNTER — APPOINTMENT (OUTPATIENT)
Dept: GENERAL RADIOLOGY | Facility: HOSPITAL | Age: 40
End: 2020-02-07
Attending: NEUROLOGICAL SURGERY
Payer: COMMERCIAL

## 2020-02-07 DIAGNOSIS — R20.0 NUMBNESS AND TINGLING IN LEFT HAND: ICD-10-CM

## 2020-02-07 DIAGNOSIS — R20.2 NUMBNESS AND TINGLING IN LEFT HAND: ICD-10-CM

## 2020-02-07 DIAGNOSIS — M48.02 FORAMINAL STENOSIS OF CERVICAL REGION: ICD-10-CM

## 2020-02-07 DIAGNOSIS — M48.02 CERVICAL STENOSIS OF SPINAL CANAL: ICD-10-CM

## 2020-02-07 DIAGNOSIS — M50.20 CERVICAL DISC HERNIATION: ICD-10-CM

## 2020-02-07 DIAGNOSIS — R20.2 NUMBNESS AND TINGLING OF BOTH FEET: ICD-10-CM

## 2020-02-07 DIAGNOSIS — M50.30 DDD (DEGENERATIVE DISC DISEASE), CERVICAL: Primary | ICD-10-CM

## 2020-02-07 DIAGNOSIS — R20.0 NUMBNESS AND TINGLING OF BOTH FEET: ICD-10-CM

## 2020-02-07 DIAGNOSIS — M54.12 CERVICAL RADICULOPATHY: ICD-10-CM

## 2020-02-07 LAB
DEPRECATED RDW RBC AUTO: 41.1 FL (ref 35.1–46.3)
ERYTHROCYTE [DISTWIDTH] IN BLOOD BY AUTOMATED COUNT: 12.4 % (ref 11–15)
HCT VFR BLD AUTO: 40 % (ref 35–48)
HGB BLD-MCNC: 12.6 G/DL (ref 12–16)
MCH RBC QN AUTO: 28.8 PG (ref 26–34)
MCHC RBC AUTO-ENTMCNC: 31.5 G/DL (ref 31–37)
MCV RBC AUTO: 91.5 FL (ref 80–100)
PLATELET # BLD AUTO: 297 10(3)UL (ref 150–450)
POCT LOT NUMBER: NORMAL
POCT URINE PREGNANCY: NEGATIVE
PROCEDURE CONTROL: PRESENT
RBC # BLD AUTO: 4.37 X10(6)UL (ref 3.8–5.3)
WBC # BLD AUTO: 15.3 X10(3) UL (ref 4–11)

## 2020-02-07 PROCEDURE — 0RT30ZZ RESECTION OF CERVICAL VERTEBRAL DISC, OPEN APPROACH: ICD-10-PCS | Performed by: NEUROLOGICAL SURGERY

## 2020-02-07 PROCEDURE — 76000 FLUOROSCOPY <1 HR PHYS/QHP: CPT | Performed by: NEUROLOGICAL SURGERY

## 2020-02-07 PROCEDURE — 99203 OFFICE O/P NEW LOW 30 MIN: CPT | Performed by: HOSPITALIST

## 2020-02-07 PROCEDURE — 0RG10A0 FUSION OF CERVICAL VERTEBRAL JOINT WITH INTERBODY FUSION DEVICE, ANTERIOR APPROACH, ANTERIOR COLUMN, OPEN APPROACH: ICD-10-PCS | Performed by: NEUROLOGICAL SURGERY

## 2020-02-07 DEVICE — SCREW 3120313 4.0 X 13 SELF TAP VAR
Type: IMPLANTABLE DEVICE | Site: NECK | Status: FUNCTIONAL
Brand: ATLANTIS® ANTERIOR CERVICAL PLATE SYSTEM

## 2020-02-07 DEVICE — BONE CERV 5 LIFENET VG2C-T57: Type: IMPLANTABLE DEVICE | Site: NECK | Status: FUNCTIONAL

## 2020-02-07 RX ORDER — HYDROMORPHONE HYDROCHLORIDE 1 MG/ML
0.2 INJECTION, SOLUTION INTRAMUSCULAR; INTRAVENOUS; SUBCUTANEOUS EVERY 2 HOUR PRN
Status: DISCONTINUED | OUTPATIENT
Start: 2020-02-07 | End: 2020-02-08

## 2020-02-07 RX ORDER — HYDROMORPHONE HYDROCHLORIDE 1 MG/ML
0.8 INJECTION, SOLUTION INTRAMUSCULAR; INTRAVENOUS; SUBCUTANEOUS EVERY 2 HOUR PRN
Status: DISCONTINUED | OUTPATIENT
Start: 2020-02-07 | End: 2020-02-08

## 2020-02-07 RX ORDER — VENLAFAXINE HYDROCHLORIDE 75 MG/1
75 CAPSULE, EXTENDED RELEASE ORAL DAILY
COMMUNITY
End: 2020-07-19

## 2020-02-07 RX ORDER — SODIUM CHLORIDE, SODIUM LACTATE, POTASSIUM CHLORIDE, CALCIUM CHLORIDE 600; 310; 30; 20 MG/100ML; MG/100ML; MG/100ML; MG/100ML
INJECTION, SOLUTION INTRAVENOUS CONTINUOUS
Status: DISCONTINUED | OUTPATIENT
Start: 2020-02-07 | End: 2020-02-08

## 2020-02-07 RX ORDER — SODIUM CHLORIDE 9 MG/ML
INJECTION, SOLUTION INTRAVENOUS CONTINUOUS
Status: DISCONTINUED | OUTPATIENT
Start: 2020-02-07 | End: 2020-02-08

## 2020-02-07 RX ORDER — NALOXONE HYDROCHLORIDE 0.4 MG/ML
80 INJECTION, SOLUTION INTRAMUSCULAR; INTRAVENOUS; SUBCUTANEOUS AS NEEDED
Status: DISCONTINUED | OUTPATIENT
Start: 2020-02-07 | End: 2020-02-07 | Stop reason: HOSPADM

## 2020-02-07 RX ORDER — SODIUM PHOSPHATE, DIBASIC AND SODIUM PHOSPHATE, MONOBASIC 7; 19 G/133ML; G/133ML
1 ENEMA RECTAL ONCE AS NEEDED
Status: DISCONTINUED | OUTPATIENT
Start: 2020-02-07 | End: 2020-02-08

## 2020-02-07 RX ORDER — DEXAMETHASONE SODIUM PHOSPHATE 4 MG/ML
VIAL (ML) INJECTION AS NEEDED
Status: DISCONTINUED | OUTPATIENT
Start: 2020-02-07 | End: 2020-02-07 | Stop reason: SURG

## 2020-02-07 RX ORDER — HYDROCODONE BITARTRATE AND ACETAMINOPHEN 5; 325 MG/1; MG/1
1 TABLET ORAL AS NEEDED
Status: DISCONTINUED | OUTPATIENT
Start: 2020-02-07 | End: 2020-02-07

## 2020-02-07 RX ORDER — ONDANSETRON 2 MG/ML
INJECTION INTRAMUSCULAR; INTRAVENOUS AS NEEDED
Status: DISCONTINUED | OUTPATIENT
Start: 2020-02-07 | End: 2020-02-07 | Stop reason: SURG

## 2020-02-07 RX ORDER — SENNOSIDES 8.6 MG
17.2 TABLET ORAL NIGHTLY
Status: DISCONTINUED | OUTPATIENT
Start: 2020-02-07 | End: 2020-02-08

## 2020-02-07 RX ORDER — HYDROCODONE BITARTRATE AND ACETAMINOPHEN 5; 325 MG/1; MG/1
2 TABLET ORAL AS NEEDED
Status: DISCONTINUED | OUTPATIENT
Start: 2020-02-07 | End: 2020-02-07

## 2020-02-07 RX ORDER — IBUPROFEN 600 MG/1
600 TABLET ORAL ONCE AS NEEDED
Status: DISCONTINUED | OUTPATIENT
Start: 2020-02-07 | End: 2020-02-07 | Stop reason: HOSPADM

## 2020-02-07 RX ORDER — GABAPENTIN 300 MG/1
300 CAPSULE ORAL NIGHTLY
COMMUNITY
End: 2020-08-25

## 2020-02-07 RX ORDER — ROCURONIUM BROMIDE 10 MG/ML
INJECTION, SOLUTION INTRAVENOUS AS NEEDED
Status: DISCONTINUED | OUTPATIENT
Start: 2020-02-07 | End: 2020-02-07 | Stop reason: SURG

## 2020-02-07 RX ORDER — METOCLOPRAMIDE HYDROCHLORIDE 5 MG/ML
10 INJECTION INTRAMUSCULAR; INTRAVENOUS EVERY 6 HOURS PRN
Status: DISCONTINUED | OUTPATIENT
Start: 2020-02-07 | End: 2020-02-08

## 2020-02-07 RX ORDER — NEOSTIGMINE METHYLSULFATE 1 MG/ML
INJECTION INTRAVENOUS AS NEEDED
Status: DISCONTINUED | OUTPATIENT
Start: 2020-02-07 | End: 2020-02-07 | Stop reason: SURG

## 2020-02-07 RX ORDER — DIAZEPAM 10 MG/1
10 TABLET ORAL EVERY 8 HOURS PRN
Status: DISCONTINUED | OUTPATIENT
Start: 2020-02-07 | End: 2020-02-08

## 2020-02-07 RX ORDER — LABETALOL HYDROCHLORIDE 5 MG/ML
5 INJECTION, SOLUTION INTRAVENOUS EVERY 5 MIN PRN
Status: DISCONTINUED | OUTPATIENT
Start: 2020-02-07 | End: 2020-02-07 | Stop reason: HOSPADM

## 2020-02-07 RX ORDER — HYDROMORPHONE HYDROCHLORIDE 1 MG/ML
0.4 INJECTION, SOLUTION INTRAMUSCULAR; INTRAVENOUS; SUBCUTANEOUS EVERY 2 HOUR PRN
Status: DISCONTINUED | OUTPATIENT
Start: 2020-02-07 | End: 2020-02-08

## 2020-02-07 RX ORDER — HYDROMORPHONE HYDROCHLORIDE 1 MG/ML
0.4 INJECTION, SOLUTION INTRAMUSCULAR; INTRAVENOUS; SUBCUTANEOUS EVERY 5 MIN PRN
Status: DISCONTINUED | OUTPATIENT
Start: 2020-02-07 | End: 2020-02-07

## 2020-02-07 RX ORDER — METOCLOPRAMIDE HYDROCHLORIDE 5 MG/ML
10 INJECTION INTRAMUSCULAR; INTRAVENOUS AS NEEDED
Status: DISCONTINUED | OUTPATIENT
Start: 2020-02-07 | End: 2020-02-07

## 2020-02-07 RX ORDER — SCOLOPAMINE TRANSDERMAL SYSTEM 1 MG/1
PATCH, EXTENDED RELEASE TRANSDERMAL
Status: COMPLETED
Start: 2020-02-07 | End: 2020-02-07

## 2020-02-07 RX ORDER — TEMAZEPAM 15 MG/1
30 CAPSULE ORAL NIGHTLY PRN
Status: DISCONTINUED | OUTPATIENT
Start: 2020-02-07 | End: 2020-02-08

## 2020-02-07 RX ORDER — HYDROCODONE BITARTRATE AND ACETAMINOPHEN 10; 325 MG/1; MG/1
2 TABLET ORAL EVERY 4 HOURS PRN
Status: DISCONTINUED | OUTPATIENT
Start: 2020-02-07 | End: 2020-02-08

## 2020-02-07 RX ORDER — ACETAMINOPHEN 325 MG/1
650 TABLET ORAL EVERY 4 HOURS PRN
Status: DISCONTINUED | OUTPATIENT
Start: 2020-02-07 | End: 2020-02-08

## 2020-02-07 RX ORDER — HYDROCODONE BITARTRATE AND ACETAMINOPHEN 10; 325 MG/1; MG/1
1 TABLET ORAL EVERY 4 HOURS PRN
Status: DISCONTINUED | OUTPATIENT
Start: 2020-02-07 | End: 2020-02-08

## 2020-02-07 RX ORDER — ORPHENADRINE CITRATE 30 MG/ML
30 INJECTION INTRAMUSCULAR; INTRAVENOUS EVERY 6 HOURS
Status: DISCONTINUED | OUTPATIENT
Start: 2020-02-07 | End: 2020-02-08

## 2020-02-07 RX ORDER — CEFAZOLIN SODIUM/WATER 2 G/20 ML
2 SYRINGE (ML) INTRAVENOUS ONCE
Status: COMPLETED | OUTPATIENT
Start: 2020-02-07 | End: 2020-02-07

## 2020-02-07 RX ORDER — CEFAZOLIN SODIUM/WATER 2 G/20 ML
SYRINGE (ML) INTRAVENOUS
Status: DISPENSED
Start: 2020-02-07 | End: 2020-02-07

## 2020-02-07 RX ORDER — GLYCOPYRROLATE 0.2 MG/ML
INJECTION, SOLUTION INTRAMUSCULAR; INTRAVENOUS AS NEEDED
Status: DISCONTINUED | OUTPATIENT
Start: 2020-02-07 | End: 2020-02-07 | Stop reason: SURG

## 2020-02-07 RX ORDER — DOCUSATE SODIUM 100 MG/1
100 CAPSULE, LIQUID FILLED ORAL 2 TIMES DAILY
Status: DISCONTINUED | OUTPATIENT
Start: 2020-02-07 | End: 2020-02-08

## 2020-02-07 RX ORDER — DIPHENHYDRAMINE HCL 25 MG
25 CAPSULE ORAL EVERY 4 HOURS PRN
Status: DISCONTINUED | OUTPATIENT
Start: 2020-02-07 | End: 2020-02-08

## 2020-02-07 RX ORDER — POLYETHYLENE GLYCOL 3350 17 G/17G
17 POWDER, FOR SOLUTION ORAL DAILY PRN
Status: DISCONTINUED | OUTPATIENT
Start: 2020-02-07 | End: 2020-02-08

## 2020-02-07 RX ORDER — VENLAFAXINE HYDROCHLORIDE 75 MG/1
75 CAPSULE, EXTENDED RELEASE ORAL DAILY
Status: DISCONTINUED | OUTPATIENT
Start: 2020-02-08 | End: 2020-02-08

## 2020-02-07 RX ORDER — ONDANSETRON 2 MG/ML
4 INJECTION INTRAMUSCULAR; INTRAVENOUS AS NEEDED
Status: DISCONTINUED | OUTPATIENT
Start: 2020-02-07 | End: 2020-02-07 | Stop reason: HOSPADM

## 2020-02-07 RX ORDER — ACETAMINOPHEN 500 MG
1000 TABLET ORAL ONCE
Status: DISCONTINUED | OUTPATIENT
Start: 2020-02-07 | End: 2020-02-07

## 2020-02-07 RX ORDER — ONDANSETRON 2 MG/ML
4 INJECTION INTRAMUSCULAR; INTRAVENOUS EVERY 4 HOURS PRN
Status: DISCONTINUED | OUTPATIENT
Start: 2020-02-07 | End: 2020-02-08

## 2020-02-07 RX ORDER — SCOLOPAMINE TRANSDERMAL SYSTEM 1 MG/1
1 PATCH, EXTENDED RELEASE TRANSDERMAL
Status: COMPLETED | OUTPATIENT
Start: 2020-02-07 | End: 2020-02-07

## 2020-02-07 RX ORDER — MIDAZOLAM HYDROCHLORIDE 1 MG/ML
INJECTION INTRAMUSCULAR; INTRAVENOUS AS NEEDED
Status: DISCONTINUED | OUTPATIENT
Start: 2020-02-07 | End: 2020-02-07 | Stop reason: SURG

## 2020-02-07 RX ORDER — HYDROMORPHONE HYDROCHLORIDE 1 MG/ML
INJECTION, SOLUTION INTRAMUSCULAR; INTRAVENOUS; SUBCUTANEOUS
Status: COMPLETED
Start: 2020-02-07 | End: 2020-02-07

## 2020-02-07 RX ORDER — CEFAZOLIN SODIUM/WATER 2 G/20 ML
2 SYRINGE (ML) INTRAVENOUS EVERY 8 HOURS
Status: COMPLETED | OUTPATIENT
Start: 2020-02-07 | End: 2020-02-08

## 2020-02-07 RX ORDER — MONTELUKAST SODIUM 10 MG/1
10 TABLET ORAL DAILY
Status: DISCONTINUED | OUTPATIENT
Start: 2020-02-07 | End: 2020-02-08

## 2020-02-07 RX ORDER — VENLAFAXINE HYDROCHLORIDE 75 MG/1
150 CAPSULE, EXTENDED RELEASE ORAL DAILY
Status: DISCONTINUED | OUTPATIENT
Start: 2020-02-08 | End: 2020-02-08

## 2020-02-07 RX ORDER — BISACODYL 10 MG
10 SUPPOSITORY, RECTAL RECTAL
Status: DISCONTINUED | OUTPATIENT
Start: 2020-02-07 | End: 2020-02-08

## 2020-02-07 RX ORDER — BACITRACIN 50000 [USP'U]/1
INJECTION, POWDER, LYOPHILIZED, FOR SOLUTION INTRAMUSCULAR AS NEEDED
Status: DISCONTINUED | OUTPATIENT
Start: 2020-02-07 | End: 2020-02-07 | Stop reason: HOSPADM

## 2020-02-07 RX ORDER — METHOCARBAMOL 750 MG/1
750 TABLET, FILM COATED ORAL 3 TIMES DAILY PRN
COMMUNITY
End: 2020-03-12

## 2020-02-07 RX ORDER — EPHEDRINE SULFATE 50 MG/ML
INJECTION, SOLUTION INTRAVENOUS AS NEEDED
Status: DISCONTINUED | OUTPATIENT
Start: 2020-02-07 | End: 2020-02-07 | Stop reason: SURG

## 2020-02-07 RX ORDER — SODIUM CHLORIDE, SODIUM LACTATE, POTASSIUM CHLORIDE, CALCIUM CHLORIDE 600; 310; 30; 20 MG/100ML; MG/100ML; MG/100ML; MG/100ML
INJECTION, SOLUTION INTRAVENOUS CONTINUOUS
Status: DISCONTINUED | OUTPATIENT
Start: 2020-02-07 | End: 2020-02-07 | Stop reason: HOSPADM

## 2020-02-07 RX ORDER — DIPHENHYDRAMINE HYDROCHLORIDE 50 MG/ML
25 INJECTION INTRAMUSCULAR; INTRAVENOUS EVERY 4 HOURS PRN
Status: DISCONTINUED | OUTPATIENT
Start: 2020-02-07 | End: 2020-02-08

## 2020-02-07 RX ORDER — LIDOCAINE HYDROCHLORIDE 10 MG/ML
INJECTION, SOLUTION EPIDURAL; INFILTRATION; INTRACAUDAL; PERINEURAL AS NEEDED
Status: DISCONTINUED | OUTPATIENT
Start: 2020-02-07 | End: 2020-02-07 | Stop reason: SURG

## 2020-02-07 RX ORDER — DEXAMETHASONE SODIUM PHOSPHATE 4 MG/ML
4 VIAL (ML) INJECTION AS NEEDED
Status: DISCONTINUED | OUTPATIENT
Start: 2020-02-07 | End: 2020-02-07 | Stop reason: HOSPADM

## 2020-02-07 RX ADMIN — DEXAMETHASONE SODIUM PHOSPHATE 4 MG: 4 MG/ML VIAL (ML) INJECTION at 11:20:00

## 2020-02-07 RX ADMIN — MIDAZOLAM HYDROCHLORIDE 2 MG: 1 INJECTION INTRAMUSCULAR; INTRAVENOUS at 11:13:00

## 2020-02-07 RX ADMIN — NEOSTIGMINE METHYLSULFATE 3 MG: 1 INJECTION INTRAVENOUS at 14:01:00

## 2020-02-07 RX ADMIN — ROCURONIUM BROMIDE 10 MG: 10 INJECTION, SOLUTION INTRAVENOUS at 11:16:00

## 2020-02-07 RX ADMIN — LIDOCAINE HYDROCHLORIDE 50 MG: 10 INJECTION, SOLUTION EPIDURAL; INFILTRATION; INTRACAUDAL; PERINEURAL at 11:16:00

## 2020-02-07 RX ADMIN — GLYCOPYRROLATE 0.2 MG: 0.2 INJECTION, SOLUTION INTRAMUSCULAR; INTRAVENOUS at 14:01:00

## 2020-02-07 RX ADMIN — EPHEDRINE SULFATE 10 MG: 50 INJECTION, SOLUTION INTRAVENOUS at 12:22:00

## 2020-02-07 RX ADMIN — ROCURONIUM BROMIDE 40 MG: 10 INJECTION, SOLUTION INTRAVENOUS at 11:43:00

## 2020-02-07 RX ADMIN — CEFAZOLIN SODIUM/WATER 2 G: 2 G/20 ML SYRINGE (ML) INTRAVENOUS at 11:13:00

## 2020-02-07 RX ADMIN — SODIUM CHLORIDE, SODIUM LACTATE, POTASSIUM CHLORIDE, CALCIUM CHLORIDE: 600; 310; 30; 20 INJECTION, SOLUTION INTRAVENOUS at 14:29:00

## 2020-02-07 RX ADMIN — ONDANSETRON 4 MG: 2 INJECTION INTRAMUSCULAR; INTRAVENOUS at 11:20:00

## 2020-02-07 NOTE — H&P
History & Physical Examination    Patient Name: Roma Dc  MRN: [id number]  CSN: [id number]  YOB: 1980    Diagnosis: Cervical myelopathy with C5-6 disc and spondylosis.     Present Illness: Admitted for elective anterior cervical discect Continuous  ceFAZolin sodium (ANCEF/KEFZOL) 2 GM/20ML premix IV syringe 2 g, 2 g, Intravenous, Once  ceFAZolin sodium (ANCEF/KEFZOL) 2 GM/20ML premix IV syringe, , ,         Allergies: No Known Allergies    Past Medical History:   Diagnosis Date   • Anesth with the patient/family. They understand and agree to proceed with plan of care. [ x ] I have reviewed the History and Physical done within the last 30 days. Any changes noted above.     Blake Echeverria  2/7/2020  10:54 AM

## 2020-02-07 NOTE — ANESTHESIA PROCEDURE NOTES
Airway  Urgency: elective    Airway not difficult    General Information and Staff    Patient location during procedure: OR  Anesthesiologist: Boris Del Cid MD  Performed: anesthesiologist     Indications and Patient Condition  Indications for airway manag

## 2020-02-07 NOTE — ANESTHESIA POSTPROCEDURE EVALUATION
Estuardo Sierra Patient Status:  Outpatient in a Bed   Age/Gender 44year old female MRN [id number]   [facility] SURGERY Attending Rakel Quan MD   Hosp Day # 0 PCP Re Penn MD       Anesthesia Post-op Note

## 2020-02-07 NOTE — OPERATIVE REPORT
[facility]    OPERATIVE REPORT    Patient:  Ric Puckett;  YOB: 1980     CSN:  [id number]; Medical Record Number:  [id number]    Admission Date:  2/7/2020 Operation Date:  2/7/2020    . ..........................     Operating Physicia obtained to pick the proper skin crease in the anterior neck for exposure of the appropriate cervical disc level. . The anterior cervical region and C-arm were then prepped and draped in a sterile fashion.   The procedure was initiated with a transverse skin operative magnification and with the dura clearly in view a variety of curettes, vertebral body right angled dissectors, and Kerrison bone rongeurs were utilized to remove herniated disc material, osteophyte, and posterior longitudinal ligament from pedicl height assured a tight fit but did not over distract the interspace and restored the disc to an anatomical height.  The bone graft was tapped and countersunk into the interspace and a micro-nerve hook was placed down both sides of the graft to ensure [facility]

## 2020-02-07 NOTE — PLAN OF CARE
Pt arrived from PACU s/p ACDF. On 2L O2 via NC, VSS. Oriented to room. Call light within reach. Enforced \"call don't fall. \" Fall precautions in place. Will continue to monitor.

## 2020-02-08 ENCOUNTER — APPOINTMENT (OUTPATIENT)
Dept: GENERAL RADIOLOGY | Facility: HOSPITAL | Age: 40
End: 2020-02-08
Attending: NURSE PRACTITIONER
Payer: COMMERCIAL

## 2020-02-08 VITALS
WEIGHT: 216.5 LBS | BODY MASS INDEX: 34.79 KG/M2 | OXYGEN SATURATION: 93 % | RESPIRATION RATE: 16 BRPM | HEART RATE: 85 BPM | DIASTOLIC BLOOD PRESSURE: 77 MMHG | SYSTOLIC BLOOD PRESSURE: 131 MMHG | HEIGHT: 66 IN | TEMPERATURE: 98 F

## 2020-02-08 LAB
DEPRECATED RDW RBC AUTO: 42 FL (ref 35.1–46.3)
ERYTHROCYTE [DISTWIDTH] IN BLOOD BY AUTOMATED COUNT: 12.4 % (ref 11–15)
HCT VFR BLD AUTO: 36.5 % (ref 35–48)
HGB BLD-MCNC: 11.3 G/DL (ref 12–16)
MCH RBC QN AUTO: 28.5 PG (ref 26–34)
MCHC RBC AUTO-ENTMCNC: 31 G/DL (ref 31–37)
MCV RBC AUTO: 92.2 FL (ref 80–100)
PLATELET # BLD AUTO: 293 10(3)UL (ref 150–450)
RBC # BLD AUTO: 3.96 X10(6)UL (ref 3.8–5.3)
WBC # BLD AUTO: 14.5 X10(3) UL (ref 4–11)

## 2020-02-08 PROCEDURE — 72040 X-RAY EXAM NECK SPINE 2-3 VW: CPT | Performed by: NURSE PRACTITIONER

## 2020-02-08 RX ORDER — ORPHENADRINE CITRATE 100 MG/1
100 TABLET, EXTENDED RELEASE ORAL 2 TIMES DAILY PRN
Qty: 20 TABLET | Refills: 0 | Status: SHIPPED | OUTPATIENT
Start: 2020-02-08 | End: 2020-08-12

## 2020-02-08 RX ORDER — HYDROCODONE BITARTRATE AND ACETAMINOPHEN 10; 325 MG/1; MG/1
1 TABLET ORAL EVERY 4 HOURS PRN
Qty: 20 TABLET | Refills: 0 | Status: SHIPPED | OUTPATIENT
Start: 2020-02-08 | End: 2020-06-29

## 2020-02-08 RX ORDER — ONDANSETRON 4 MG/1
4 TABLET, ORALLY DISINTEGRATING ORAL EVERY 8 HOURS PRN
Qty: 20 TABLET | Refills: 0 | Status: SHIPPED | OUTPATIENT
Start: 2020-02-08 | End: 2020-07-20

## 2020-02-08 NOTE — PLAN OF CARE
DCI reviewed with pt and spouse. Spine education D/C video played for pt and spouse. All questions answered. IV removed. RX sent to outside pharmacy. D/C to home via family vehicle.

## 2020-02-08 NOTE — PLAN OF CARE
POD 1 ACDF 5-6, Pt is AAOX4, 2L O2 PRN , room air, HOB kept up, Aspen collar in place at all times, coverlet to site, scant old drainage present, CSD's, swallowing without difficulty, PT /OT eval today, some nausea early in the shift that prevented getting

## 2020-02-08 NOTE — PLAN OF CARE
Pt alert and oriented. C/o mild pain, scheduled norflex given. Tolerating clear/full liq diet, no N/V. No difficulty swallowing, passed dysphagia screening. On 2L O2 via NC. , IS encouraged and demonstrated. Collar on and aligned.  Coverlet to anterior n

## 2020-02-08 NOTE — OCCUPATIONAL THERAPY NOTE
OCCUPATIONAL THERAPY QUICK EVALUATION - INPATIENT    Room Number: 374/374-A  Evaluation Date: 2/8/2020     Type of Evaluation: Quick Eval  Presenting Problem: (s/p C5-6 ACDF)    Physician Order: IP Consult to Occupational Therapy  Reason for Therapy:  ADL/ PROFILE    HOME SITUATION  Type of Home: House  Home Layout: Two level  Lives With: Family(spouse and 3 daughters )    Toilet and Equipment: Standard height toilet  Shower/Tub and Equipment: Tub-shower combo    Occupation/Status: Director of [employer]   Sakshi Ashby CI    FUNCTIONAL TRANSFER ASSESSMENT  Supine to Sit : Supervision(with cues for sequencing of log roll technique)  Sit to Stand: Modified independent    Skilled Therapy Provided: Pt presented up in chair at start of OT evaluation; pt's spouse present at be dressing, toilet transfers, functional mobility, car transfer (simulated), tub transfer using tub-transfer bench at supervision level. The patient demonstrated good safety awareness, and good adherence to spinal precautions during ADLs.      The patient peres

## 2020-02-08 NOTE — PHYSICAL THERAPY NOTE
PHYSICAL THERAPY QUICK EVALUATION - INPATIENT    Room Number: 374/374-A  Evaluation Date: 2/8/2020  Presenting Problem: ACDF C5-C6 2/7/20  Physician Order: PT Eval and Treat     History related to current admission: Pt is 43 yo female admitted from home Level of [place]: Pt typically ind with self care and mobility. Works as  supervisor.     SUBJECTIVE  \"I'm doing pretty good\"    OBJECTIVE  Precautions: Spine  Fall Risk: Standard fall risk    WEIGHT BEARING RESTRICTION  Weight Bearing Restr <> supine with log roll with cueing with supervision. Gait x 300 ft with supervision, no AD, no LOB. Pt trained on stair negotiation as noted above, good return demo. Pt trained on simulated car/tub bench transfer, completes with supervision.  Pt returns to

## 2020-02-08 NOTE — PLAN OF CARE
Pt alert and oriented. Up to bedside commode with AM x1 assist with gait belt. Voiding. Last BM 2/6, miralax given. Passing flatus. Tolerating soft diet, no N/V this AM. Aspen collar on and aligned. Coverlet, D/I scant old drainage noted.  Maintains precaut

## 2020-02-08 NOTE — PROGRESS NOTES
[facility]   Neurosurgery Progress Note    Federico Gifford Patient Status:  Outpatient in a Bed    1980 MRN [id number]   [facility] 3SW-A Attending Alba March MD   Hosp Day # 0 PCP Nieves Durand MD       Subjective:  Carol Boothe

## 2020-02-08 NOTE — CONSULTS
[facility] HOSPITALIST  CONSULT     [facility] Patient Status:  Outpatient in a Bed    1980 MRN [id number]   [facility] 3SW-A Attending Mary Curran MD   Hosp Day # 0 PCP Nicole Lopez MD     Reason for consult: med mgmt Joseph Mina MD at [facility] MAIN OR   • TONSILLECTOMY     • TUBAL LIGATION         Social History:  reports that she has never smoked. She has never used smokeless tobacco. She reports that she does not drink alcohol or use drugs.     Family History:   Family Hist murmurs, rubs or gallops. Equal pulses. Chest and Back: No tenderness or deformity. Abdomen: Soft, nontender, nondistended. Positive bowel sounds. No rebound, guarding or organomegaly. Neurologic: No focal neurological deficits.  CNII-XII grossly intac

## 2020-02-10 ENCOUNTER — TELEPHONE (OUTPATIENT)
Dept: SURGERY | Facility: CLINIC | Age: 40
End: 2020-02-10

## 2020-02-10 ENCOUNTER — OFFICE VISIT (OUTPATIENT)
Dept: SURGERY | Facility: CLINIC | Age: 40
End: 2020-02-10
Payer: COMMERCIAL

## 2020-02-10 ENCOUNTER — TELEPHONE (OUTPATIENT)
Dept: FAMILY MEDICINE CLINIC | Facility: CLINIC | Age: 40
End: 2020-02-10

## 2020-02-10 VITALS — HEART RATE: 84 BPM | TEMPERATURE: 98 F | DIASTOLIC BLOOD PRESSURE: 80 MMHG | SYSTOLIC BLOOD PRESSURE: 116 MMHG

## 2020-02-10 DIAGNOSIS — M50.30 DDD (DEGENERATIVE DISC DISEASE), CERVICAL: Primary | ICD-10-CM

## 2020-02-10 DIAGNOSIS — M50.20 CERVICAL DISC HERNIATION: ICD-10-CM

## 2020-02-10 DIAGNOSIS — M54.12 CERVICAL RADICULOPATHY: ICD-10-CM

## 2020-02-10 DIAGNOSIS — Z98.1 S/P CERVICAL SPINAL FUSION: ICD-10-CM

## 2020-02-10 PROCEDURE — 99024 POSTOP FOLLOW-UP VISIT: CPT | Performed by: PHYSICIAN ASSISTANT

## 2020-02-10 RX ORDER — METHYLPREDNISOLONE 4 MG/1
TABLET ORAL
Qty: 1 PACKAGE | Refills: 0 | Status: SHIPPED | OUTPATIENT
Start: 2020-02-10 | End: 2020-06-29 | Stop reason: ALTCHOICE

## 2020-02-10 NOTE — TELEPHONE ENCOUNTER
Sushila Granado filled out paperwork in room at [facility].  Pt has original. Sent copy to scanning

## 2020-02-10 NOTE — PATIENT INSTRUCTIONS
Refill policies:    • Allow 2-3 business days for refills; controlled substances may take longer.   • Contact your pharmacy at least 5 days prior to running out of medication and have them send an electronic request or submit request through the “request re Depending on your insurance carrier, approval may take 3-10 days. It is highly recommended patients contact their insurance carrier directly to determine coverage.   If test is done without insurance authorization, patient may be responsible for the entire 2/27/2020  4. Any changes we can see her later this week. Signs and symptoms to watch for were discussed  5.   Continue with cervical brace

## 2020-02-10 NOTE — TELEPHONE ENCOUNTER
Called patient to offer an appointment for a skin check with Rocio Velasquez for today or tomorrow.   Patient accepted, transferred to  to schedule

## 2020-02-10 NOTE — PROGRESS NOTES
[facility] Neurosurgery follow-up      HISTORY OF PRESENT ILLNESS:Parisa Jesus is a 44year old LH female returns in follow-up after C5-6 ACDF 2/7/2020 Dr. Elvira Alegre.     He returns in follow-up states her neck pain is about a 4/10 she is had occasional shooting The last 6 months she started developing neck pain having increasing spasms in her back and is in this capacity that she is here. She complains of frontal headache which is pretty constant tinnitus.   She complains of right ear pain or pressure which he SKIN: Warm, dry, no rashes. Her right medial thigh in the middle third shows an area of slight discoloration insistent with pressure on the skin from surgical positioning. The skin is intact there is no redness or warmth or skin breakdown.   It is tender C5-C6:  There is bilateral uncinate hypertrophy with diffuse disc bulge with superimposed left and right paracentral disc protrusions extending to the uncovertebral joints bilaterally without significant change.   There is associated moderate bilateral neur

## 2020-02-10 NOTE — TELEPHONE ENCOUNTER
Pt reports bruise, right back thigh above knee that is red, raised, warm to touch. OR 2/7/2020 per Dr. Thai Bond, Cervical Fusion. Scheduled with Dr. Constanza Hawkins for today. Advised pt to notify surgeon. Task completed.

## 2020-02-10 NOTE — TELEPHONE ENCOUNTER
pt had sx on 2/7/20,has bruise or rash on back of r thigh. Talked to PCP, but they wanted her to call Dr Leanna Christopher. Had it since she got home. Pt states really painful, it's raised.

## 2020-02-10 NOTE — TELEPHONE ENCOUNTER
Patient is s/p ANTERIOR CERVICAL FUSION BG & INST 1 LEVEL 02/07.   Patient states she noticed a bruise Saturday after discharge to her right inner thigh, closer to the knee,  painful, red in color, \"feels like a deep bruise\", no numbness or tingling to lo

## 2020-02-14 ENCOUNTER — OFFICE VISIT (OUTPATIENT)
Dept: FAMILY MEDICINE CLINIC | Facility: CLINIC | Age: 40
End: 2020-02-14
Payer: COMMERCIAL

## 2020-02-14 VITALS
HEIGHT: 66 IN | OXYGEN SATURATION: 98 % | HEART RATE: 84 BPM | SYSTOLIC BLOOD PRESSURE: 108 MMHG | BODY MASS INDEX: 34.87 KG/M2 | WEIGHT: 217 LBS | TEMPERATURE: 99 F | DIASTOLIC BLOOD PRESSURE: 82 MMHG | RESPIRATION RATE: 16 BRPM

## 2020-02-14 DIAGNOSIS — F51.01 PRIMARY INSOMNIA: ICD-10-CM

## 2020-02-14 DIAGNOSIS — Z98.1 STATUS POST CERVICAL SPINAL FUSION: Primary | ICD-10-CM

## 2020-02-14 DIAGNOSIS — F32.A ANXIETY AND DEPRESSION: ICD-10-CM

## 2020-02-14 DIAGNOSIS — F41.9 ANXIETY AND DEPRESSION: ICD-10-CM

## 2020-02-14 PROCEDURE — 99214 OFFICE O/P EST MOD 30 MIN: CPT | Performed by: PHYSICIAN ASSISTANT

## 2020-02-14 NOTE — PATIENT INSTRUCTIONS
Thank you for choosing Geoffrey Hare PA-C at [place] [address]  To Do: Rachel Cooney  1. Continue medications as prescribed  2.  Follow-up 6 months, for annual physical     • Please signup for MY CHART, which is electronic access to your record if please call Central Scheduling at [phone]  Please allow our office 5 business days to contact you regarding any testing results.     Refill policies:   Allow 3 business days for refills; controlled substances may take longer and must be picked up from

## 2020-02-14 NOTE — PROGRESS NOTES
[facility] Internal Medicine Progress Note    CC:  Patient presents with:  Post-Op: 2/7/2020 CERVICAL DISCECTOMY.       HPI:   HPI  Pt is follow-up for cervical discectomy and fusion C5-6 on 2/7/20  She states she is doing well  Pain is manageable Rfl: 5  Venlafaxine HCl  MG Oral Capsule SR 24 Hr, Take 1 capsule (150 mg total) by mouth once daily. , Disp: 90 capsule, Rfl: 3  Montelukast Sodium (SINGULAIR) 10 MG Oral Tab, Take 1 tablet (10 mg total) by mouth daily. , Disp: 90 tablet, Rfl: 3  onda oriented to person, place, and time. Skin: Skin is warm and dry. Psychiatric: Mood and affect normal.   Vitals reviewed.         Assessment and Plan:  Status post cervical spinal fusion  (primary encounter diagnosis)  Healing well  Patient has follow-up

## 2020-02-18 ENCOUNTER — PATIENT MESSAGE (OUTPATIENT)
Dept: SURGERY | Facility: CLINIC | Age: 40
End: 2020-02-18

## 2020-02-18 RX ORDER — HYDROCODONE BITARTRATE AND ACETAMINOPHEN 5; 325 MG/1; MG/1
1 TABLET ORAL EVERY 8 HOURS PRN
Qty: 30 TABLET | Refills: 0 | Status: SHIPPED | OUTPATIENT
Start: 2020-02-18 | End: 2020-06-29

## 2020-02-18 NOTE — TELEPHONE ENCOUNTER
From: Bryson Dumont  To: Ruchi Casas MD  Sent: 2/18/2020 9:31 AM CST  Subject: Prescription Question    Dr. Elvira Alegre,    I am reaching out to figure out what I should use for the pain I continue to have after recovery from the cervical fusion.  I was u

## 2020-02-27 ENCOUNTER — OFFICE VISIT (OUTPATIENT)
Dept: SURGERY | Facility: CLINIC | Age: 40
End: 2020-02-27
Payer: COMMERCIAL

## 2020-02-27 VITALS — SYSTOLIC BLOOD PRESSURE: 136 MMHG | DIASTOLIC BLOOD PRESSURE: 82 MMHG | HEART RATE: 82 BPM

## 2020-02-27 DIAGNOSIS — M50.20 CERVICAL DISC HERNIATION: ICD-10-CM

## 2020-02-27 DIAGNOSIS — M48.02 CERVICAL STENOSIS OF SPINAL CANAL: ICD-10-CM

## 2020-02-27 DIAGNOSIS — Z98.1 S/P CERVICAL SPINAL FUSION: Primary | ICD-10-CM

## 2020-02-27 PROCEDURE — 99024 POSTOP FOLLOW-UP VISIT: CPT | Performed by: PHYSICIAN ASSISTANT

## 2020-02-27 NOTE — PROGRESS NOTES
[facility] Neurosurgery follow-up      HISTORY OF PRESENT ILLNESS:Parisa Ibrahim is a 44year old  female returns in follow-up after C5-6 ACDF 2/7/2020 Dr. Ida Morel.     She returns today in follow-up doing very well she has no neck pain currently at most he ge Upper extremity strength:       Deltoid    Triceps     Biceps        Wrist Extension  Finger extension Thumb Abduction  Thumb adduction Intrinsics   Right         5        5         5          5 5 5 5 5 5   Left         5        5         5          5 4.  Continue with the cervical collar she can relax it for meals showering and sleeping recommend she consider soft cervical collar for sleeping. Continue maximal lift of about 8 to 10 pounds.           Johnny Armando M.S., PA-C  [facility]

## 2020-02-27 NOTE — PATIENT INSTRUCTIONS
Refill policies:    • Allow 2-3 business days for refills; controlled substances may take longer.   • Contact your pharmacy at least 5 days prior to running out of medication and have them send an electronic request or submit request through the “request re Depending on your insurance carrier, approval may take 3-10 days. It is highly recommended patients contact their insurance carrier directly to determine coverage.   If test is done without insurance authorization, patient may be responsible for the entire let us know when she would like to be released to sedentary to light duty as a   3. Follow-up March 26  4.   Continue with the cervical collar she can relax it for meals showering and sleeping recommend she consider soft cervical collar f

## 2020-03-09 ENCOUNTER — PATIENT MESSAGE (OUTPATIENT)
Dept: SURGERY | Facility: CLINIC | Age: 40
End: 2020-03-09

## 2020-03-09 NOTE — TELEPHONE ENCOUNTER
From: Ivania Zelaya  To: Wanetta Najjar, [place]  Sent: 3/9/2020 7:06 AM CDT  Subject: Other    Heljimbo Elias,    I am returning to work today, Monday March 9th for 4 hours days.  Would it be possible to get a note for work stating that it is ok to retu

## 2020-03-12 ENCOUNTER — PATIENT MESSAGE (OUTPATIENT)
Dept: FAMILY MEDICINE CLINIC | Facility: CLINIC | Age: 40
End: 2020-03-12

## 2020-03-12 RX ORDER — METHOCARBAMOL 750 MG/1
TABLET, FILM COATED ORAL
Qty: 60 TABLET | Refills: 0 | Status: SHIPPED | OUTPATIENT
Start: 2020-03-12 | End: 2020-06-22

## 2020-03-12 RX ORDER — ACETAMINOPHEN AND CODEINE PHOSPHATE 300; 30 MG/1; MG/1
TABLET ORAL
Qty: 45 TABLET | Refills: 0 | OUTPATIENT
Start: 2020-03-12

## 2020-03-12 NOTE — TELEPHONE ENCOUNTER
From: Violetta Conti  To: Nash Ryan PA-C  Sent: 3/12/2020 9:21 AM CDT  Subject: Prescription Question    For Silvana Corporal,    I wanted to know if there is a way to adjust my night time medicine Temazepam because since the surgery it hasn't been working

## 2020-03-12 NOTE — TELEPHONE ENCOUNTER
Requested Prescriptions     Pending Prescriptions Disp Refills   • METHOCARBAMOL 750 MG Oral Tab [Pharmacy Med Name: METHOCARBAMOL 750MG TABLETS] 60 tablet 0     Sig: TAKE 1 TABLET BY MOUTH THREE TIMES DAILY AS NEEDED.  MAY CAUSE DROWSINESS     Non protocol

## 2020-03-13 NOTE — TELEPHONE ENCOUNTER
She is on the highest dose of the temazepam 30mg. Has it been more difficult to sleep because of the pain, is anxiety worse.

## 2020-03-23 ENCOUNTER — TELEPHONE (OUTPATIENT)
Dept: SURGERY | Facility: CLINIC | Age: 40
End: 2020-03-23

## 2020-03-25 ENCOUNTER — PATIENT MESSAGE (OUTPATIENT)
Dept: SURGERY | Facility: CLINIC | Age: 40
End: 2020-03-25

## 2020-03-25 NOTE — TELEPHONE ENCOUNTER
At [address] [place] [address] on 2/27/20 per MARGARITA Norris:    \"ASSESSMENT:  1. C5-6 ACDF 2/7/2020     PLAN:  1. X-rays of the cervical spine before next appointment  2.   She will continue off work for now she will let us know when she would like to be released to seden

## 2020-03-25 NOTE — TELEPHONE ENCOUNTER
From: Alessandro Mejia  To: Ubaldo Urban MD  Sent: 3/25/2020 9:53 AM CDT  Subject: Visit Follow-up Question    Dr. Sheryl Lassiter,    With my appointment being reschedule for June, I do have a few questions.  Before, I was restricted to not lifting over 8lbs and

## 2020-04-03 ENCOUNTER — PATIENT MESSAGE (OUTPATIENT)
Dept: SURGERY | Facility: CLINIC | Age: 40
End: 2020-04-03

## 2020-04-03 NOTE — TELEPHONE ENCOUNTER
Patient called. Side alk uneven, tripped and fell. States felt as if a dizzy spell. Landed as described by nursing. Did not hit her head. No LOC. No shooting arm pain. Occasional numbness and tingling, not worsened since fall and improving since surgery.  Tai File

## 2020-04-03 NOTE — TELEPHONE ENCOUNTER
From: Shady Keen  To: MARGARITA Lim  Sent: 4/3/2020 1:08 PM CDT  Subject: Visit Follow-up Question    Hello,    I am sure you are bombarded with questions.  I wanted to let you know of a situation that happen yesterday, to see if I should be lo

## 2020-04-17 RX ORDER — TEMAZEPAM 30 MG/1
CAPSULE ORAL
Qty: 30 CAPSULE | Refills: 0 | Status: SHIPPED | OUTPATIENT
Start: 2020-04-17 | End: 2020-05-19

## 2020-05-18 NOTE — TELEPHONE ENCOUNTER
Requested Prescriptions     Pending Prescriptions Disp Refills   • TEMAZEPAM 30 MG Oral Cap [Pharmacy Med Name: TEMAZEPAM 30MG CAPSULES] 30 capsule 0     Sig: TAKE 1 CAPSULE BY MOUTH EVERY NIGHT AS NEEDED FOR SLEEP       LOV: 1/13/20 for pre-op exam  RTC:

## 2020-05-19 RX ORDER — TEMAZEPAM 30 MG/1
CAPSULE ORAL
Qty: 30 CAPSULE | Refills: 0 | Status: SHIPPED | OUTPATIENT
Start: 2020-05-19 | End: 2020-06-24

## 2020-05-20 ENCOUNTER — PATIENT MESSAGE (OUTPATIENT)
Dept: FAMILY MEDICINE CLINIC | Facility: CLINIC | Age: 40
End: 2020-05-20

## 2020-05-21 NOTE — TELEPHONE ENCOUNTER
From: Madeline Brewster  To: Mine Tellez PA-C  Sent: 5/20/2020 10:36 PM CDT  Subject: Non-Urgent Medical Question    For [employer],     I have been having severe knee pain and stiffness in Right knee.  I keep thinking it may be arthritis as that has been

## 2020-05-27 ENCOUNTER — VIRTUAL PHONE E/M (OUTPATIENT)
Dept: FAMILY MEDICINE CLINIC | Facility: CLINIC | Age: 40
End: 2020-05-27
Payer: COMMERCIAL

## 2020-05-27 DIAGNOSIS — M25.561 ACUTE PAIN OF RIGHT KNEE: Primary | ICD-10-CM

## 2020-05-27 PROCEDURE — 99213 OFFICE O/P EST LOW 20 MIN: CPT | Performed by: FAMILY MEDICINE

## 2020-05-27 RX ORDER — METHYLPREDNISOLONE 4 MG/1
TABLET ORAL
Qty: 1 KIT | Refills: 0 | Status: SHIPPED | OUTPATIENT
Start: 2020-05-27 | End: 2020-06-29 | Stop reason: ALTCHOICE

## 2020-05-27 NOTE — PROGRESS NOTES
Virtual Telephone Check-In    Jody Redd verbally consents to a Virtual/Telephone Check-In visit on 05/27/20. Patient has been referred to the [facility] website at [email or web address] to review the yearly Consent to Treat document.     Patient unders

## 2020-05-27 NOTE — PROGRESS NOTES
HPI:    Patient ID: [place] is a 44year old female. HPI  Ms. Amauri Albright female with known history of arthritis and neuropathy presenting for a phone visit.   She has been experiencing right knee pain which she localizes on the anterior aspe 20 tablet 0   • gabapentin 300 MG Oral Cap Take 300 mg by mouth nightly. START WITH EVERY NIGHT, CAN. INCREASE TO 2 TIMES DAILY AS NEEDED     • Acetaminophen (ACETAMINOPHEN EXTRA STRENGTH) 500 MG Oral Cap Take 1,000 mg by mouth one time.      • Venlafaxine

## 2020-05-27 NOTE — PATIENT INSTRUCTIONS
Thank you for choosing Angel Morales MD at [place] [address]  To Do: Dana Cooney  1. Please take meds as directed. [facility] is located in [address]. Monday, Tuesday & Friday – 8 a.m. to 4 p.m. Wednesday, Thursday – 7 a.m. to 3 p. outweigh those potential risks and we strive to make you healthier and to improve your quality of life.     Referrals, and Radiology Information:    If your insurance requires a referral to a specialist, please allow 5 business days to process your referral

## 2020-06-01 ENCOUNTER — HOSPITAL ENCOUNTER (OUTPATIENT)
Dept: GENERAL RADIOLOGY | Age: 40
Discharge: HOME | End: 2020-06-01
Attending: PHYSICIAN ASSISTANT
Payer: COMMERCIAL

## 2020-06-01 DIAGNOSIS — Z98.1 S/P CERVICAL SPINAL FUSION: ICD-10-CM

## 2020-06-01 DIAGNOSIS — M48.02 CERVICAL STENOSIS OF SPINAL CANAL: ICD-10-CM

## 2020-06-01 DIAGNOSIS — M50.20 CERVICAL DISC HERNIATION: ICD-10-CM

## 2020-06-01 PROCEDURE — 72040 X-RAY EXAM NECK SPINE 2-3 VW: CPT | Performed by: PHYSICIAN ASSISTANT

## 2020-06-04 ENCOUNTER — OFFICE VISIT (OUTPATIENT)
Dept: SURGERY | Facility: CLINIC | Age: 40
End: 2020-06-04
Payer: COMMERCIAL

## 2020-06-04 ENCOUNTER — TELEPHONE (OUTPATIENT)
Dept: SURGERY | Facility: CLINIC | Age: 40
End: 2020-06-04

## 2020-06-04 DIAGNOSIS — Z98.1 S/P CERVICAL SPINAL FUSION: Primary | ICD-10-CM

## 2020-06-04 DIAGNOSIS — M50.30 DDD (DEGENERATIVE DISC DISEASE), CERVICAL: ICD-10-CM

## 2020-06-04 PROCEDURE — 99024 POSTOP FOLLOW-UP VISIT: CPT | Performed by: PHYSICIAN ASSISTANT

## 2020-06-04 NOTE — PROGRESS NOTES
[facility] Neurosurgery follow-up      HISTORY OF PRESENT ILLNESS:Parisa Bravo is a 36year old  female returns in follow-up after C5-6 ACDF 2/7/2020 Dr. Wilda Emery. No pain, no numbness, no weakness feels really good.     Last history  She returns today in Deltoid    Triceps     Biceps        Wrist Extension  Finger extension Thumb Abduction  Thumb adduction Intrinsics   Right         5        5         5          5 5 5 5 5 5   Left         5        5         5          5 5 5 5 5 5     Lower extremit [address] [facility], [address] [facility] [place]hang Yoo, [address]  [phone]

## 2020-06-04 NOTE — PATIENT INSTRUCTIONS
PLAN:  1. X-rays of the cervical spine before next appointment  2.  FU 3 months  3.  Call with changes

## 2020-06-04 NOTE — TELEPHONE ENCOUNTER
LMTCB on identified VM per check out notes on 06/04 appt: Follow-up disposition: Return in about 3 months (around 9/4/2020) for [place].

## 2020-06-22 ENCOUNTER — TELEPHONE (OUTPATIENT)
Dept: ORTHOPEDICS CLINIC | Facility: CLINIC | Age: 40
End: 2020-06-22

## 2020-06-22 DIAGNOSIS — M25.561 CHRONIC PAIN OF BOTH KNEES: Primary | ICD-10-CM

## 2020-06-22 DIAGNOSIS — M25.562 CHRONIC PAIN OF BOTH KNEES: Primary | ICD-10-CM

## 2020-06-22 DIAGNOSIS — G89.29 CHRONIC PAIN OF BOTH KNEES: Primary | ICD-10-CM

## 2020-06-22 RX ORDER — METHOCARBAMOL 750 MG/1
TABLET, FILM COATED ORAL
Qty: 60 TABLET | Refills: 0 | Status: SHIPPED | OUTPATIENT
Start: 2020-06-22 | End: 2020-07-20

## 2020-06-22 NOTE — TELEPHONE ENCOUNTER
Requesting METHOCARBAMOL 750 MG   LOV: 2/14/20   RTC:   Last Relevant Labs: 1/13/20  Filled: 3/12/20  # 60 with 0 refills    Future Appointments   Date Time Provider Emma Plummer   8/12/2020  9:40 AM Ophelia TORREZ PA-C [facility] [address]   9/1

## 2020-06-22 NOTE — TELEPHONE ENCOUNTER
Requesting Temazepam 30mg  LOV: 5/27/2020 for Knee pain  RTC: prn  Last Relevant Labs: 8/27/19  Filled: 5/19/2020 #30 with 0 refills    Future Appointments   Date Time Provider Emma Plummer   8/12/2020  9:40 AM Richy TORREZ PA-C [facility]

## 2020-06-24 ENCOUNTER — HOSPITAL ENCOUNTER (OUTPATIENT)
Dept: GENERAL RADIOLOGY | Age: 40
Discharge: HOME | End: 2020-06-24
Attending: NURSE PRACTITIONER
Payer: COMMERCIAL

## 2020-06-24 DIAGNOSIS — M25.562 CHRONIC PAIN OF BOTH KNEES: ICD-10-CM

## 2020-06-24 DIAGNOSIS — G89.29 CHRONIC PAIN OF BOTH KNEES: ICD-10-CM

## 2020-06-24 DIAGNOSIS — M25.561 CHRONIC PAIN OF BOTH KNEES: ICD-10-CM

## 2020-06-24 PROCEDURE — 73564 X-RAY EXAM KNEE 4 OR MORE: CPT | Performed by: NURSE PRACTITIONER

## 2020-06-24 RX ORDER — TEMAZEPAM 30 MG/1
CAPSULE ORAL
Qty: 30 CAPSULE | Refills: 0 | Status: SHIPPED | OUTPATIENT
Start: 2020-06-24 | End: 2020-07-20

## 2020-06-29 ENCOUNTER — OFFICE VISIT (OUTPATIENT)
Dept: ORTHOPEDICS CLINIC | Facility: CLINIC | Age: 40
End: 2020-06-29

## 2020-06-29 VITALS — HEART RATE: 96 BPM | OXYGEN SATURATION: 99 %

## 2020-06-29 DIAGNOSIS — M22.41 PATELLA, CHONDROMALACIA, RIGHT: ICD-10-CM

## 2020-06-29 DIAGNOSIS — M22.2X1 PATELLOFEMORAL PAIN SYNDROME OF RIGHT KNEE: Primary | ICD-10-CM

## 2020-06-29 PROCEDURE — 99203 OFFICE O/P NEW LOW 30 MIN: CPT | Performed by: NURSE PRACTITIONER

## 2020-06-29 RX ORDER — MELOXICAM 7.5 MG/1
7.5 TABLET ORAL DAILY
Qty: 30 TABLET | Refills: 0 | Status: SHIPPED | OUTPATIENT
Start: 2020-06-29 | End: 2020-06-29

## 2020-06-29 RX ORDER — MELOXICAM 7.5 MG/1
TABLET ORAL
Qty: 90 TABLET | Refills: 0 | Status: SHIPPED | OUTPATIENT
Start: 2020-06-29 | End: 2020-09-22

## 2020-06-29 NOTE — PROGRESS NOTES
[facility] New Patient Note    CC: Patient presents with:  Knee Pain: Has been experiencing rt knee pain with mild swlling no redness. Started about 6 weeks ago. Hx of arthritis.        HPI: This 36year old female presents today with complaints of rig EVERY NIGHT AS NEEDED FOR SLEEP 30 capsule 0   • METHOCARBAMOL 750 MG Oral Tab TAKE 1 TABLET BY MOUTH THREE TIMES DAILY AS NEEDED.  MAY CAUSE DROWSINESS 60 tablet 0   • ondansetron (ZOFRAN ODT) 4 MG Oral Tablet Dispersible Take 1 tablet (4 mg total) by mout the lateral patellofemoral compartment. She has no medial lateral joint line tenderness she has no laxity valgus varus stress and a negative Lockman's. She is neurovascular intact distally.     IMAGING  Right knee x-rays were personally reviewed today cleo

## 2020-07-15 NOTE — TELEPHONE ENCOUNTER
Requested Prescriptions     Pending Prescriptions Disp Refills   • VENLAFAXINE HCL  MG Oral Capsule SR 24 Hr [Pharmacy Med Name: VENLAFAXINE 150MG ER CAPSULES] 90 capsule 3     Sig: TAKE 1 CAPSULE(150 MG) BY MOUTH EVERY DAY   • VENLAFAXINE HCL ER 75

## 2020-07-19 RX ORDER — VENLAFAXINE HYDROCHLORIDE 150 MG/1
CAPSULE, EXTENDED RELEASE ORAL
Qty: 90 CAPSULE | Refills: 0 | Status: SHIPPED | OUTPATIENT
Start: 2020-07-19 | End: 2020-08-12

## 2020-07-19 RX ORDER — MONTELUKAST SODIUM 10 MG/1
TABLET ORAL
Qty: 90 TABLET | Refills: 3 | Status: SHIPPED | OUTPATIENT
Start: 2020-07-19 | End: 2021-08-02

## 2020-07-19 RX ORDER — VENLAFAXINE HYDROCHLORIDE 75 MG/1
CAPSULE, EXTENDED RELEASE ORAL
Qty: 90 CAPSULE | Refills: 0 | Status: SHIPPED | OUTPATIENT
Start: 2020-07-19 | End: 2020-08-12

## 2020-07-20 RX ORDER — METHOCARBAMOL 750 MG/1
TABLET, FILM COATED ORAL
Qty: 60 TABLET | Refills: 0 | Status: SHIPPED | OUTPATIENT
Start: 2020-07-20 | End: 2020-08-25

## 2020-07-20 NOTE — TELEPHONE ENCOUNTER
Requesting METHOCARBAMOL 750 MG   LOV: 1/13/20  RTC:   Last Relevant Labs:   Filled: 6/22/20  # 60 with 0 refills    Future Appointments   Date Time Provider Emma Plummer   8/12/2020  9:40 AM Travis TORREZ PA-C [facility] [address]   9/10/2020 1

## 2020-07-21 RX ORDER — TEMAZEPAM 30 MG/1
CAPSULE ORAL
Qty: 30 CAPSULE | Refills: 0 | Status: SHIPPED | OUTPATIENT
Start: 2020-07-21 | End: 2020-08-12

## 2020-08-12 ENCOUNTER — OFFICE VISIT (OUTPATIENT)
Dept: FAMILY MEDICINE CLINIC | Facility: CLINIC | Age: 40
End: 2020-08-12
Payer: COMMERCIAL

## 2020-08-12 VITALS
TEMPERATURE: 98 F | BODY MASS INDEX: 37.49 KG/M2 | DIASTOLIC BLOOD PRESSURE: 84 MMHG | OXYGEN SATURATION: 97 % | SYSTOLIC BLOOD PRESSURE: 122 MMHG | HEART RATE: 87 BPM | HEIGHT: 65 IN | RESPIRATION RATE: 16 BRPM | WEIGHT: 225 LBS

## 2020-08-12 DIAGNOSIS — F41.9 ANXIETY: ICD-10-CM

## 2020-08-12 DIAGNOSIS — Z12.31 ENCOUNTER FOR SCREENING MAMMOGRAM FOR MALIGNANT NEOPLASM OF BREAST: ICD-10-CM

## 2020-08-12 DIAGNOSIS — Z00.00 WELLNESS EXAMINATION: Primary | ICD-10-CM

## 2020-08-12 DIAGNOSIS — E53.8 B12 DEFICIENCY: ICD-10-CM

## 2020-08-12 DIAGNOSIS — Z12.4 SCREENING FOR CERVICAL CANCER: ICD-10-CM

## 2020-08-12 DIAGNOSIS — Z00.00 LABORATORY EXAM ORDERED AS PART OF ROUTINE GENERAL MEDICAL EXAMINATION: ICD-10-CM

## 2020-08-12 DIAGNOSIS — E55.9 VITAMIN D DEFICIENCY: ICD-10-CM

## 2020-08-12 DIAGNOSIS — F51.01 PRIMARY INSOMNIA: ICD-10-CM

## 2020-08-12 PROCEDURE — 3074F SYST BP LT 130 MM HG: CPT | Performed by: PHYSICIAN ASSISTANT

## 2020-08-12 PROCEDURE — 88175 CYTOPATH C/V AUTO FLUID REDO: CPT | Performed by: PHYSICIAN ASSISTANT

## 2020-08-12 PROCEDURE — 87624 HPV HI-RISK TYP POOLED RSLT: CPT | Performed by: PHYSICIAN ASSISTANT

## 2020-08-12 PROCEDURE — 3079F DIAST BP 80-89 MM HG: CPT | Performed by: PHYSICIAN ASSISTANT

## 2020-08-12 PROCEDURE — 99396 PREV VISIT EST AGE 40-64: CPT | Performed by: PHYSICIAN ASSISTANT

## 2020-08-12 PROCEDURE — 3008F BODY MASS INDEX DOCD: CPT | Performed by: PHYSICIAN ASSISTANT

## 2020-08-12 RX ORDER — VENLAFAXINE HYDROCHLORIDE 150 MG/1
150 CAPSULE, EXTENDED RELEASE ORAL DAILY
Qty: 90 CAPSULE | Refills: 1 | Status: SHIPPED | OUTPATIENT
Start: 2020-08-12 | End: 2021-02-20

## 2020-08-12 RX ORDER — VENLAFAXINE HYDROCHLORIDE 75 MG/1
75 CAPSULE, EXTENDED RELEASE ORAL DAILY
Qty: 90 CAPSULE | Refills: 1 | Status: SHIPPED | OUTPATIENT
Start: 2020-08-12 | End: 2021-02-20

## 2020-08-12 RX ORDER — TEMAZEPAM 30 MG/1
30 CAPSULE ORAL NIGHTLY PRN
Qty: 30 CAPSULE | Refills: 5 | Status: SHIPPED | OUTPATIENT
Start: 2020-08-12 | End: 2021-02-24

## 2020-08-12 RX ORDER — MULTIVIT-MIN/IRON FUM/FOLIC AC 7.5 MG-4
1 TABLET ORAL DAILY
COMMUNITY

## 2020-08-12 NOTE — PROGRESS NOTES
REASON FOR VISIT:    Robert Broussard is a 36year old female who presents for an [address].     Has been following with neuro for her neck    Doing well on Effexor XR 225mg, had some ups and downs but thinks it was more situational.    Doesn SERVICES  INDICATIONS AND SCHEDULE Recommendation Internal Lab or Procedure   Colonoscopy Screen Every 10 years There are no preventive care reminders to display for this patient.    Flex Sigmoidoscopy Screen  Every 5 years No results found for this or any (150 mg total) by mouth daily. 90 capsule 1   • Venlafaxine HCl ER 75 MG Oral Capsule SR 24 Hr Take 1 capsule (75 mg total) by mouth daily. 90 capsule 1   • METHOCARBAMOL 750 MG Oral Tab TAKE 1 TABLET BY MOUTH THREE TIMES DAILY AS NEEDED.  MAY CAUSE Liz Donnell Use      Smoking status: Never Smoker      Smokeless tobacco: Never Used    Alcohol use: No      Frequency: Never      Binge frequency: Never    Drug use: No    Occ:       REVIEW OF SYSTEMS:   GENERAL: feels well otherwise  SKIN: denies any unusual OTHER RELEVANT CHRONIC CONDITIONS:   Pritesh Robbins is a 36year old female who presents for an [address].      PLAN SUMMARY:   Diagnoses and all orders for this visit:    Wellness examination  PHQ-2 score 0  CAGE score 0  Updated cervical reminders to display for this patient.   Shingles:     Influenza Annually   Pneumococcal if high risk   Td/Tdap once then every 10 years   HPV Males 11-21   Zoster (Shingles) 60 and older: one dose   Varicella 2 doses if not immune   MMR 1-2 doses if born a

## 2020-08-12 NOTE — PATIENT INSTRUCTIONS
Thank you for choosing Kamila Isaac PA-C at [place] [address]  To Do: Sammi Cooney  1. Continue medications as prescribed  2. Get lab work done  3.  Referral for mammogram    • Please signup for MY CHART, which is electronic access to your recor testing, please call Central Scheduling at [phone]  Please allow our office 5 business days to contact you regarding any testing results.     Refill policies:   Allow 3 business days for refills; controlled substances may take longer and must be picked

## 2020-08-13 LAB — HPV I/H RISK 1 DNA SPEC QL NAA+PROBE: NEGATIVE

## 2020-08-25 RX ORDER — METHOCARBAMOL 750 MG/1
TABLET, FILM COATED ORAL
Qty: 60 TABLET | Refills: 0 | Status: SHIPPED | OUTPATIENT
Start: 2020-08-25 | End: 2020-09-22

## 2020-08-25 RX ORDER — TEMAZEPAM 30 MG/1
CAPSULE ORAL
Qty: 30 CAPSULE | Refills: 0 | OUTPATIENT
Start: 2020-08-25

## 2020-08-25 RX ORDER — GABAPENTIN 300 MG/1
CAPSULE ORAL
Qty: 90 CAPSULE | Refills: 0 | Status: SHIPPED | OUTPATIENT
Start: 2020-08-25 | End: 2020-11-12

## 2020-08-25 NOTE — TELEPHONE ENCOUNTER
Patient is requesting a refill on Temazepam 30 mg # 30  LOV- 8/12/20  Last Refill- 8/12/20  Refused RX was already sent in on 8/12/20

## 2020-08-25 NOTE — TELEPHONE ENCOUNTER
Name from pharmacy: GABAPENTIN 300MG CAPSULES         Will file in chart as: GABAPENTIN 300 MG Oral Cap         Sig: TAKE 1 CAPSULE BY MOUTH EVERY NIGHT. START WITH EVERY NIGHT, CAN.  INCREASE TO 2 TIMES DAILY AS NEEDED    Disp:  90 capsule    Refills:  0

## 2020-09-22 RX ORDER — MELOXICAM 7.5 MG/1
7.5 TABLET ORAL DAILY
Qty: 90 TABLET | Refills: 0 | Status: SHIPPED | OUTPATIENT
Start: 2020-09-22 | End: 2020-11-12

## 2020-09-22 RX ORDER — METHOCARBAMOL 750 MG/1
750 TABLET, FILM COATED ORAL 3 TIMES DAILY PRN
Qty: 60 TABLET | Refills: 2 | Status: SHIPPED | OUTPATIENT
Start: 2020-09-22 | End: 2020-12-01

## 2020-09-22 NOTE — TELEPHONE ENCOUNTER
Requested Prescriptions     Pending Prescriptions Disp Refills   • methocarbamol 750 MG Oral Tab 60 tablet 2     Sig: Take 1 tablet (750 mg total) by mouth 3 (three) times daily as needed.    • Meloxicam 7.5 MG Oral Tab 90 tablet 0     Sig: Take 1 tablet (7

## 2020-09-23 RX ORDER — MELOXICAM 7.5 MG/1
7.5 TABLET ORAL DAILY
Qty: 90 TABLET | Refills: 0 | OUTPATIENT
Start: 2020-09-23

## 2020-09-23 RX ORDER — METHOCARBAMOL 750 MG/1
750 TABLET, FILM COATED ORAL 3 TIMES DAILY PRN
Qty: 60 TABLET | Refills: 2 | OUTPATIENT
Start: 2020-09-23

## 2020-09-23 NOTE — TELEPHONE ENCOUNTER
methocarbamol 750 MG Oral Tab          Sig: Take 1 tablet (750 mg total) by mouth 3 (three) times daily as needed.     Disp:  60 tablet    Refills:  2    Start: 9/22/2020    Class: Normal    Non-formulary    Last ordered: Yesterday by Rajesh Blanchard MD

## 2020-10-19 ENCOUNTER — PATIENT MESSAGE (OUTPATIENT)
Dept: FAMILY MEDICINE CLINIC | Facility: CLINIC | Age: 40
End: 2020-10-19

## 2020-10-19 NOTE — TELEPHONE ENCOUNTER
From: Angelique Alert  To: Wm Casey PA-C  Sent: 10/19/2020 12:26 PM CDT  Subject: Non-Urgent Medical Question    For the past 5 days I have been experiencing shoulder pain in my right shoulder. It is on-going and more of an aching pain.  It also f

## 2020-10-20 NOTE — TELEPHONE ENCOUNTER
Spoke to patient. Has had right shoulder pain for past 6 days. Aching pain is constant. Sometimes has sharp burning pain down arm. Tylenol and meloxicam not helping. Patient scheduled appt for tomorrow.    Future Appointments   Date Time Provider Murtaza Alves

## 2020-10-21 ENCOUNTER — OFFICE VISIT (OUTPATIENT)
Dept: FAMILY MEDICINE CLINIC | Facility: CLINIC | Age: 40
End: 2020-10-21

## 2020-10-21 VITALS
WEIGHT: 219 LBS | BODY MASS INDEX: 36.49 KG/M2 | HEIGHT: 65 IN | DIASTOLIC BLOOD PRESSURE: 74 MMHG | RESPIRATION RATE: 16 BRPM | HEART RATE: 102 BPM | TEMPERATURE: 98 F | OXYGEN SATURATION: 96 % | SYSTOLIC BLOOD PRESSURE: 118 MMHG

## 2020-10-21 DIAGNOSIS — M25.511 ACUTE PAIN OF RIGHT SHOULDER: Primary | ICD-10-CM

## 2020-10-21 PROCEDURE — 3078F DIAST BP <80 MM HG: CPT | Performed by: PHYSICIAN ASSISTANT

## 2020-10-21 PROCEDURE — 3074F SYST BP LT 130 MM HG: CPT | Performed by: PHYSICIAN ASSISTANT

## 2020-10-21 PROCEDURE — 3008F BODY MASS INDEX DOCD: CPT | Performed by: PHYSICIAN ASSISTANT

## 2020-10-21 PROCEDURE — 99213 OFFICE O/P EST LOW 20 MIN: CPT | Performed by: PHYSICIAN ASSISTANT

## 2020-10-21 RX ORDER — METHYLPREDNISOLONE 4 MG/1
TABLET ORAL
Qty: 1 KIT | Refills: 0 | Status: SHIPPED | OUTPATIENT
Start: 2020-10-21 | End: 2020-12-01 | Stop reason: ALTCHOICE

## 2020-10-21 NOTE — PROGRESS NOTES
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 Respiratory: Negative for cough and shortness of breath. Cardiovascular: Negative for chest pain. Gastrointestinal: Negative for nausea and vomiting.    Musculoskeletal: Positive for arthralgias • methylPREDNISolone (MEDROL) 4 MG Oral Tablet Therapy Pack 1 kit 0     Sig: As directed. Imaging & Consults:  None     Patient/Caregiver Education: Patient/Caregiver Education: There are no barriers to learning. Medical education done.  Outcome: Becki

## 2020-10-29 ENCOUNTER — TELEMEDICINE (OUTPATIENT)
Dept: FAMILY MEDICINE CLINIC | Facility: CLINIC | Age: 40
End: 2020-10-29

## 2020-10-29 ENCOUNTER — TELEPHONE (OUTPATIENT)
Dept: FAMILY MEDICINE CLINIC | Facility: CLINIC | Age: 40
End: 2020-10-29

## 2020-10-29 DIAGNOSIS — R68.83 CHILLS: Primary | ICD-10-CM

## 2020-10-29 DIAGNOSIS — M79.10 MYALGIA: ICD-10-CM

## 2020-10-29 PROCEDURE — 99213 OFFICE O/P EST LOW 20 MIN: CPT | Performed by: FAMILY MEDICINE

## 2020-10-29 NOTE — TELEPHONE ENCOUNTER
Future Appointments   Date Time Provider Emma Plummer   10/29/2020  3:15 PM Harika Estrada MD [facility] [address] [facility] [address]     Patient verbally consents to a virtual/telephone check-in service for the date and time noted above.  Patient understands and accept

## 2020-10-29 NOTE — PATIENT INSTRUCTIONS
Thank you for choosing Jomar Lopez MD at [place] [address]  To Do: Arelia Severance Budny  1. Coronavirus Disease 2019 (COVID-19)     [facility] is committed to the safety and well-being of our patients, members, employees, and communities.  As 2. Monitor your symptoms carefully. If your symptoms get worse, call your healthcare provider immediately. 3. Get rest and stay hydrated.    4. If you have a medical appointment, call the healthcare provider ahead of time and tell them that you have or may ? At least 24 hours have passed since recovery defined as resolution of fever without the use of fever-reducing medications; and  · Improvement in respiratory symptoms (e.g., cough, shortness of breath); and  · At least 10 days have passed since symptoms f If you would be interested in donating your plasma to help treat others diagnosed with the virus, please contact Andressa directly on their website: [email or web address]    Who is eligible to donate convalescent plasma? • You can NOW use [employer] to book your appointments with us, or consider using open access scheduling which is through the edward website [email or web address] and type in Sherley Stahl MD and follow the links for \"Schedule Online Now\"    •To sc Please allow our office 5 business days to contact you regarding any testing results. Refill policies:   Allow 3 business days for refills; controlled substances may take longer and must be picked up from the office in person.   Narcotic medications can

## 2020-10-29 NOTE — PROGRESS NOTES
Video Check-In    Equilla File verbally consents to a Virtual/Telephone Check-In service on 10/29/20. Patient understands and accepts financial responsibility for any deductible, co-insurance and/or co-pays associated with this service.     Duration of

## 2020-10-30 ENCOUNTER — APPOINTMENT (OUTPATIENT)
Dept: LAB | Age: 40
End: 2020-10-30
Attending: FAMILY MEDICINE
Payer: OTHER GOVERNMENT

## 2020-10-30 DIAGNOSIS — R68.83 CHILLS: ICD-10-CM

## 2020-10-30 DIAGNOSIS — M79.10 MYALGIA: ICD-10-CM

## 2020-11-03 ENCOUNTER — PATIENT MESSAGE (OUTPATIENT)
Dept: FAMILY MEDICINE CLINIC | Facility: CLINIC | Age: 40
End: 2020-11-03

## 2020-11-03 ENCOUNTER — TELEMEDICINE (OUTPATIENT)
Dept: FAMILY MEDICINE CLINIC | Facility: CLINIC | Age: 40
End: 2020-11-03

## 2020-11-03 ENCOUNTER — TELEPHONE (OUTPATIENT)
Dept: FAMILY MEDICINE CLINIC | Facility: CLINIC | Age: 40
End: 2020-11-03

## 2020-11-03 DIAGNOSIS — U07.1 COVID-19: Primary | ICD-10-CM

## 2020-11-03 PROCEDURE — 99213 OFFICE O/P EST LOW 20 MIN: CPT | Performed by: FAMILY MEDICINE

## 2020-11-03 NOTE — TELEPHONE ENCOUNTER
----- Message from Nayeli Oleary MD sent at 11/2/2020  8:00 PM CST -----  Covid positive   Please practice precautions   Fu with me virtually

## 2020-11-03 NOTE — PROGRESS NOTES
HPI:    Patient ID: [place] Sandoval is a 36year old female. HPI  Ms. Ish Minor pleasant 51-year-old female following up for a video visit today after she tested positive for COVID-19 in 10/30/2020.   She is feeling better but has residual congestion and lo Constitutional: No distress. She is alert, coherent, is able speak in full sentences with no difficulty.               ASSESSMENT/PLAN:   Covid-19  (primary encounter diagnosis)  -Clinically improved symptom continue self quarantine for the next several

## 2020-11-03 NOTE — TELEPHONE ENCOUNTER
From: Zoila Shahid  To: Urszula Luna MD  Sent: 11/3/2020 11:08 AM CST  Subject: Visit Follow-up Question    I forgot to ask if I could get a note stating I am positive for Covid to give to my work?  I will also have paperwork that will need to be jose

## 2020-11-03 NOTE — TELEPHONE ENCOUNTER
Spoke to patient. Patient advised. Verbalized understanding.    Future Appointments   Date Time Provider Emma Kavitha   11/3/2020 11:00 AM Harika Estrada MD [facility] [address] [facility]     Pritesh Robbins verbally consents to a Virtual/Telephone Check-In se

## 2020-11-11 ENCOUNTER — TELEPHONE (OUTPATIENT)
Dept: SURGERY | Facility: CLINIC | Age: 40
End: 2020-11-11

## 2020-11-12 RX ORDER — MELOXICAM 7.5 MG/1
TABLET ORAL
Qty: 90 TABLET | Refills: 0 | Status: SHIPPED | OUTPATIENT
Start: 2020-11-12 | End: 2020-12-29

## 2020-11-12 RX ORDER — GABAPENTIN 300 MG/1
300 CAPSULE ORAL NIGHTLY
Qty: 90 CAPSULE | Refills: 1 | Status: SHIPPED | OUTPATIENT
Start: 2020-11-12 | End: 2021-06-01

## 2020-11-12 NOTE — TELEPHONE ENCOUNTER
Requested Prescriptions     Pending Prescriptions Disp Refills   • GABAPENTIN 300 MG Oral Cap [Pharmacy Med Name: Gabapentin 300 Mg Cap Nort] 90 capsule 0     Sig: TAKE ONE CAPSULE BY MOUTH NIGHTLY AT BEDTIME TO START MAY INCREASE TO 1 CAPSULE TWO TIMES A

## 2020-11-13 ENCOUNTER — TELEPHONE (OUTPATIENT)
Dept: FAMILY MEDICINE CLINIC | Facility: CLINIC | Age: 40
End: 2020-11-13

## 2020-11-13 NOTE — TELEPHONE ENCOUNTER
Recd ppw from [address] [place] [address] Form. Please complete and fax to [phone]. Form placed in MA folder.

## 2020-11-14 ENCOUNTER — PATIENT MESSAGE (OUTPATIENT)
Dept: FAMILY MEDICINE CLINIC | Facility: CLINIC | Age: 40
End: 2020-11-14

## 2020-11-16 NOTE — TELEPHONE ENCOUNTER
From: Federico Gifford  To: Nieves Durand MD  Sent: 11/14/2020 6:47 PM CST  Subject: Non-Urgent Medical Question    Dr. Sharmin Mcclelland I faxed the office with disability papers. These are for short term disability.  Would you be able to fill out

## 2020-11-23 ENCOUNTER — PATIENT MESSAGE (OUTPATIENT)
Dept: FAMILY MEDICINE CLINIC | Facility: CLINIC | Age: 40
End: 2020-11-23

## 2020-11-23 NOTE — TELEPHONE ENCOUNTER
From: Sarah Meléndez  To:  Mara Reyez MD  Sent: 11/23/2020 12:09 AM CST  Subject: Visit Follow-up Question    Hello,  After having COVID-19 i have felt very fatigue, but also noticed a pain in my shoulder and at the middle of my skull it feels as tho

## 2020-12-01 ENCOUNTER — OFFICE VISIT (OUTPATIENT)
Dept: FAMILY MEDICINE CLINIC | Facility: CLINIC | Age: 40
End: 2020-12-01

## 2020-12-01 VITALS
HEIGHT: 65 IN | DIASTOLIC BLOOD PRESSURE: 80 MMHG | RESPIRATION RATE: 16 BRPM | HEART RATE: 84 BPM | OXYGEN SATURATION: 99 % | WEIGHT: 216 LBS | TEMPERATURE: 98 F | SYSTOLIC BLOOD PRESSURE: 126 MMHG | BODY MASS INDEX: 35.99 KG/M2

## 2020-12-01 DIAGNOSIS — H10.32 ACUTE CONJUNCTIVITIS OF LEFT EYE, UNSPECIFIED ACUTE CONJUNCTIVITIS TYPE: ICD-10-CM

## 2020-12-01 DIAGNOSIS — Z23 NEED FOR VACCINATION: ICD-10-CM

## 2020-12-01 DIAGNOSIS — M54.9 LEFT-SIDED BACK PAIN, UNSPECIFIED BACK LOCATION, UNSPECIFIED CHRONICITY: Primary | ICD-10-CM

## 2020-12-01 PROCEDURE — 90471 IMMUNIZATION ADMIN: CPT | Performed by: FAMILY MEDICINE

## 2020-12-01 PROCEDURE — 3079F DIAST BP 80-89 MM HG: CPT | Performed by: FAMILY MEDICINE

## 2020-12-01 PROCEDURE — 3008F BODY MASS INDEX DOCD: CPT | Performed by: FAMILY MEDICINE

## 2020-12-01 PROCEDURE — 90686 IIV4 VACC NO PRSV 0.5 ML IM: CPT | Performed by: FAMILY MEDICINE

## 2020-12-01 PROCEDURE — 99214 OFFICE O/P EST MOD 30 MIN: CPT | Performed by: FAMILY MEDICINE

## 2020-12-01 PROCEDURE — 3074F SYST BP LT 130 MM HG: CPT | Performed by: FAMILY MEDICINE

## 2020-12-01 RX ORDER — CYCLOBENZAPRINE HCL 10 MG
10 TABLET ORAL 3 TIMES DAILY PRN
Qty: 30 TABLET | Refills: 1 | Status: SHIPPED | OUTPATIENT
Start: 2020-12-01 | End: 2021-01-25

## 2020-12-01 RX ORDER — OFLOXACIN 3 MG/ML
1 SOLUTION/ DROPS OPHTHALMIC 4 TIMES DAILY
Qty: 10 ML | Refills: 0 | Status: SHIPPED | OUTPATIENT
Start: 2020-12-01 | End: 2021-03-23 | Stop reason: ALTCHOICE

## 2020-12-01 NOTE — PROGRESS NOTES
HPI:    Patient ID: Kvng Ardon is a 36year old female.     HPI  Ms. Nadine Cui is a pleasant 44-year-old female with history of cervical disc herniation and degenerative disc disease and stenosis status post C5-C6 surgery here today for left-sided back pa • MELOXICAM 7.5 MG Oral Tab TAKE ONE TABLET BY MOUTH ONCE DAILY 90 tablet 0   • Multiple Vitamins-Minerals (MULTI-VITAMIN/MINERALS) Oral Tab Take 1 tablet by mouth daily.      • temazepam 30 MG Oral Cap Take 1 capsule (30 mg total) by mouth nightly as neede -We will stop methocarbamol; continue with gabapentin and meloxicam; will put on trial of Flexeril as I believe that this is more muscular in nature; however I did encourage her to follow-up with her spine surgeon  Acute conjunctivitis of left eye, unspeci

## 2020-12-01 NOTE — PATIENT INSTRUCTIONS
Thank you for choosing Nayeli Oleary MD at [place] [address]  To Do: Ora Cooney  1. Please take meds as directed. J[facility] Ry Ramirez is located in [address]. Monday, Tuesday & Friday – 8 a.m. to 4 p.m. Wednesday, Thursday – 7 a.m. to 3 p. outweigh those potential risks and we strive to make you healthier and to improve your quality of life.     Referrals, and Radiology Information:    If your insurance requires a referral to a specialist, please allow 5 business days to process your referral

## 2020-12-29 RX ORDER — MELOXICAM 7.5 MG/1
TABLET ORAL
Qty: 90 TABLET | Refills: 0 | Status: SHIPPED | OUTPATIENT
Start: 2020-12-29 | End: 2021-06-28

## 2020-12-29 NOTE — TELEPHONE ENCOUNTER
Requesting MELOXICAM 7.5 MG   LOV: 12/1/20   RTC:   Last Relevant Labs: lab orders pending  Filled: 11/12/20  #90 with 0 refills    No future appointments.

## 2021-01-25 RX ORDER — CYCLOBENZAPRINE HCL 10 MG
TABLET ORAL
Qty: 30 TABLET | Refills: 1 | Status: SHIPPED | OUTPATIENT
Start: 2021-01-25 | End: 2021-03-15

## 2021-01-25 NOTE — TELEPHONE ENCOUNTER
Requesting CYCLOBENZAPRINE 10 MG   LOV: 12/1/20   RTC:   Last Relevant Labs:   Filled: 12/1/20  #30 with 1 refills    No future appointments.

## 2021-02-01 ENCOUNTER — PATIENT MESSAGE (OUTPATIENT)
Dept: FAMILY MEDICINE CLINIC | Facility: CLINIC | Age: 41
End: 2021-02-01

## 2021-02-01 DIAGNOSIS — M72.2 PLANTAR FASCIITIS: Primary | ICD-10-CM

## 2021-02-02 NOTE — TELEPHONE ENCOUNTER
From: Fermin Bell  To: Gareth Umaña MD  Sent: 2/1/2021 8:55 PM CST  Subject: Non-Urgent Medical Question    My plantar fasciitis has flared up about a month or so ago. I have been using support pads under my arches during my work days.  Now I have been

## 2021-02-04 ENCOUNTER — HOSPITAL ENCOUNTER (OUTPATIENT)
Dept: GENERAL RADIOLOGY | Age: 41
Discharge: HOME | End: 2021-02-04
Attending: PODIATRIST
Payer: COMMERCIAL

## 2021-02-04 ENCOUNTER — OFFICE VISIT (OUTPATIENT)
Dept: ORTHOPEDICS CLINIC | Facility: CLINIC | Age: 41
End: 2021-02-04

## 2021-02-04 DIAGNOSIS — M76.61 TENDONITIS, ACHILLES, RIGHT: ICD-10-CM

## 2021-02-04 DIAGNOSIS — M72.2 PLANTAR FASCIITIS, BILATERAL: Primary | ICD-10-CM

## 2021-02-04 DIAGNOSIS — M72.2 PLANTAR FASCIITIS: ICD-10-CM

## 2021-02-04 PROCEDURE — 73650 X-RAY EXAM OF HEEL: CPT | Performed by: PODIATRIST

## 2021-02-04 PROCEDURE — 99203 OFFICE O/P NEW LOW 30 MIN: CPT | Performed by: PODIATRIST

## 2021-02-04 NOTE — PROGRESS NOTES
[facility] New Patient Note    CC: Patient presents with:   Foot Pain: bilateral heels pain/xrays today      HPI: The patient is a 36year old female who presents today with complaints of 2months hx of heel pain, right first and now posterior ri • Multiple Vitamins-Minerals (MULTI-VITAMIN/MINERALS) Oral Tab Take 1 tablet by mouth daily. • temazepam 30 MG Oral Cap Take 1 capsule (30 mg total) by mouth nightly as needed for Sleep.  30 capsule 5   • Venlafaxine HCl  MG Oral Capsule SR 24 H injecction after therapy      Marlyn Covarrubias. Tabby De La Fuente, DPM  [facility]      This document was partially prepared using [employer].

## 2021-02-08 ENCOUNTER — TELEPHONE (OUTPATIENT)
Dept: ORTHOPEDICS CLINIC | Facility: CLINIC | Age: 41
End: 2021-02-08

## 2021-02-08 NOTE — TELEPHONE ENCOUNTER
I left a voice mail message for the patient, letting her know that she can schedule an appointment for an orthoics casting.

## 2021-02-20 RX ORDER — VENLAFAXINE HYDROCHLORIDE 150 MG/1
CAPSULE, EXTENDED RELEASE ORAL
Qty: 90 CAPSULE | Refills: 1 | Status: SHIPPED | OUTPATIENT
Start: 2021-02-20 | End: 2021-08-31

## 2021-02-20 RX ORDER — VENLAFAXINE HYDROCHLORIDE 75 MG/1
CAPSULE, EXTENDED RELEASE ORAL
Qty: 90 CAPSULE | Refills: 1 | Status: SHIPPED | OUTPATIENT
Start: 2021-02-20 | End: 2021-08-31

## 2021-02-20 NOTE — TELEPHONE ENCOUNTER
Requested Prescriptions     Pending Prescriptions Disp Refills   • VENLAFAXINE HCL  MG Oral Capsule SR 24 Hr [Pharmacy Med Name: Venlafaxine Hcl Er 24hr 150 Mg Cap Auro] 90 capsule 0     Sig: TAKE ONE CAPSULE BY MOUTH ONE TIME DAILY   • VENLAFAXINE H

## 2021-02-24 RX ORDER — TEMAZEPAM 30 MG/1
CAPSULE ORAL
Qty: 30 CAPSULE | Refills: 0 | Status: SHIPPED | OUTPATIENT
Start: 2021-02-24 | End: 2021-03-22

## 2021-02-24 NOTE — TELEPHONE ENCOUNTER
Requesting Temazepam 30mg  LOV: 12/1/2020  RTC: prn  Last Relevant Labs: 8/27/19  Filled: 7/21/2020 #30 with 0 refills    No future appointments.     Per [place] , last dispensed 1/23/21 #30    Rx pended and routed for approval/denial

## 2021-03-15 RX ORDER — CYCLOBENZAPRINE HCL 10 MG
TABLET ORAL
Qty: 30 TABLET | Refills: 2 | Status: SHIPPED | OUTPATIENT
Start: 2021-03-15 | End: 2021-03-23

## 2021-03-15 NOTE — TELEPHONE ENCOUNTER
Requested Prescriptions     Pending Prescriptions Disp Refills   • CYCLOBENZAPRINE 10 MG Oral Tab [Pharmacy Med Name: Cyclobenzaprine Hydrochloride 10 Mg Tab Trup] 30 tablet 0     Sig: Take 1 tablet by mouth three times daily as needed for Muscle spasms.

## 2021-03-22 RX ORDER — TEMAZEPAM 30 MG/1
CAPSULE ORAL
Qty: 30 CAPSULE | Refills: 0 | Status: SHIPPED | OUTPATIENT
Start: 2021-03-22 | End: 2021-05-01

## 2021-03-22 NOTE — TELEPHONE ENCOUNTER
Requesting Temazepam 30mg  LOV: 12/1/2020 acute visit  RTC: prn  Last Relevant Labs: 8/1/19  Filled: 2/24/21 #30 with 0 refills    No future appointments.     Per [place] , last dispensed 2/24/21 #30    Rx pended and routed for approval/denial

## 2021-03-23 ENCOUNTER — OFFICE VISIT (OUTPATIENT)
Dept: FAMILY MEDICINE CLINIC | Facility: CLINIC | Age: 41
End: 2021-03-23

## 2021-03-23 VITALS
BODY MASS INDEX: 38.15 KG/M2 | RESPIRATION RATE: 16 BRPM | HEIGHT: 65 IN | DIASTOLIC BLOOD PRESSURE: 84 MMHG | HEART RATE: 80 BPM | TEMPERATURE: 98 F | OXYGEN SATURATION: 98 % | WEIGHT: 229 LBS | SYSTOLIC BLOOD PRESSURE: 120 MMHG

## 2021-03-23 DIAGNOSIS — S76.011A STRAIN OF GLUTEUS MEDIUS OF RIGHT LOWER EXTREMITY, INITIAL ENCOUNTER: Primary | ICD-10-CM

## 2021-03-23 PROCEDURE — 99213 OFFICE O/P EST LOW 20 MIN: CPT | Performed by: FAMILY MEDICINE

## 2021-03-23 PROCEDURE — 3074F SYST BP LT 130 MM HG: CPT | Performed by: FAMILY MEDICINE

## 2021-03-23 PROCEDURE — 3008F BODY MASS INDEX DOCD: CPT | Performed by: FAMILY MEDICINE

## 2021-03-23 PROCEDURE — 3079F DIAST BP 80-89 MM HG: CPT | Performed by: FAMILY MEDICINE

## 2021-03-23 RX ORDER — CYCLOBENZAPRINE HCL 5 MG
5 TABLET ORAL NIGHTLY PRN
Qty: 10 TABLET | Refills: 0 | Status: SHIPPED | OUTPATIENT
Start: 2021-03-23 | End: 2021-04-13

## 2021-03-23 NOTE — PROGRESS NOTES
HPI/Subjective:   Patient ID: Zoila Shahid is a 36year old female. HPI  Ms. Justice Poole is a pleasant 51-year-old female here today for right-sided lower back pain which started yesterday while she was at work.   She was reaching for it objects at the att Physical Exam  Vitals reviewed. Musculoskeletal:      Lumbar back: Tenderness present. No swelling, edema or spasms. Normal range of motion. Legs:    Neurological:      Mental Status: She is alert.          Assessment & Plan:   Strain of gluteus me

## 2021-03-23 NOTE — PATIENT INSTRUCTIONS
Thank you for choosing Jolene Ruiz MD at [place] [address]  To Do: Marshall Cooney  1. Please take meds as directed. [facility] Pont is located in [address]. Monday, Tuesday & Friday – 8 a.m. to 4 p.m. Wednesday, Thursday – 7 a.m. to 3 p. outweigh those potential risks and we strive to make you healthier and to improve your quality of life.     Referrals, and Radiology Information:    If your insurance requires a referral to a specialist, please allow 5 business days to process your referral apply heat (warm shower or warm bath) for 15 to 20 minutes several times a day, or alternate ice and heat. · You may use over-the-counter pain medicine to control pain, unless another medicine was prescribed.  If you have chronic liver or kidney disease or

## 2021-04-13 ENCOUNTER — OFFICE VISIT (OUTPATIENT)
Dept: FAMILY MEDICINE CLINIC | Facility: CLINIC | Age: 41
End: 2021-04-13

## 2021-04-13 ENCOUNTER — PATIENT MESSAGE (OUTPATIENT)
Dept: FAMILY MEDICINE CLINIC | Facility: CLINIC | Age: 41
End: 2021-04-13

## 2021-04-13 VITALS
RESPIRATION RATE: 16 BRPM | DIASTOLIC BLOOD PRESSURE: 82 MMHG | HEIGHT: 65 IN | BODY MASS INDEX: 38.82 KG/M2 | WEIGHT: 233 LBS | HEART RATE: 86 BPM | TEMPERATURE: 98 F | OXYGEN SATURATION: 98 % | SYSTOLIC BLOOD PRESSURE: 126 MMHG

## 2021-04-13 DIAGNOSIS — S03.40XA SPRAIN OF TEMPOROMANDIBULAR JOINT, INITIAL ENCOUNTER: Primary | ICD-10-CM

## 2021-04-13 PROCEDURE — 99213 OFFICE O/P EST LOW 20 MIN: CPT | Performed by: FAMILY MEDICINE

## 2021-04-13 PROCEDURE — 3074F SYST BP LT 130 MM HG: CPT | Performed by: FAMILY MEDICINE

## 2021-04-13 PROCEDURE — 3008F BODY MASS INDEX DOCD: CPT | Performed by: FAMILY MEDICINE

## 2021-04-13 PROCEDURE — 3079F DIAST BP 80-89 MM HG: CPT | Performed by: FAMILY MEDICINE

## 2021-04-13 RX ORDER — TRAMADOL HYDROCHLORIDE 50 MG/1
50 TABLET ORAL EVERY 6 HOURS PRN
Qty: 30 TABLET | Refills: 1 | Status: SHIPPED | OUTPATIENT
Start: 2021-04-13 | End: 2021-08-18 | Stop reason: ALTCHOICE

## 2021-04-13 RX ORDER — ORPHENADRINE CITRATE 100 MG/1
100 TABLET, EXTENDED RELEASE ORAL NIGHTLY PRN
Qty: 10 TABLET | Refills: 0 | Status: SHIPPED | OUTPATIENT
Start: 2021-04-13 | End: 2021-05-01

## 2021-04-13 NOTE — PATIENT INSTRUCTIONS
Thank you for choosing Mara Reyez MD at [place] [address]  To Do: Nicki Cooney  1. Please take meds as directed. [facility] Ry Ramirez is located in [address]. Monday, Tuesday & Friday – 8 a.m. to 4 p.m. Wednesday, Thursday – 7 a.m. to 3 p. outweigh those potential risks and we strive to make you healthier and to improve your quality of life.     Referrals, and Radiology Information:    If your insurance requires a referral to a specialist, please allow 5 business days to process your referral temporomandibular joints (TMJs) allow the lower jaw to move smoothly. · The lower jaw (mandible) supports the bottom row of teeth and gives shape to the lower face and chin. This is the bone that moves as the mouth opens and closes.   · The upper jaw (maxi difficult to make certain sounds or to speak clearly. · Breathing problems. If the airway is narrow or blocked, breathing may be noisy or difficult. You may have sleep apnea (breathing that stops during sleep).   · Problems with appearance. You may be [place]

## 2021-04-13 NOTE — TELEPHONE ENCOUNTER
From: Cortez Rodarte  To: Jolene Ruiz MD  Sent: 4/13/2021 6:59 AM CDT  Subject: Other    I have had severe jaw pain for 2 days on my left side of my mouth.  Not sure if I see a doctor for this or a dentist but I can't bite down and it's locking up on

## 2021-04-13 NOTE — PROGRESS NOTES
HPI/Subjective:   Patient ID: Piyush Gaona is a 36year old female. HPI  Ms. Lizette Frias is a pleasant 80-year-old female here today for left-sided jaw pain which started yesterday described as dull nonradiating constant mild to moderate intensity.   This tablet 3     Allergies:No Known Allergies    Objective:   Physical Exam  Vitals reviewed. Constitutional:       General: She is not in acute distress. HENT:      Head: Normocephalic. Jaw: There is normal jaw occlusion.  Tenderness and pain on moveme

## 2021-04-30 ENCOUNTER — PATIENT MESSAGE (OUTPATIENT)
Dept: FAMILY MEDICINE CLINIC | Facility: CLINIC | Age: 41
End: 2021-04-30

## 2021-04-30 DIAGNOSIS — M79.601 RIGHT ARM PAIN: Primary | ICD-10-CM

## 2021-05-01 RX ORDER — ORPHENADRINE CITRATE 100 MG/1
TABLET, EXTENDED RELEASE ORAL
Qty: 10 TABLET | Refills: 0 | Status: SHIPPED | OUTPATIENT
Start: 2021-05-01 | End: 2021-08-18 | Stop reason: ALTCHOICE

## 2021-05-01 RX ORDER — TEMAZEPAM 30 MG/1
CAPSULE ORAL
Qty: 30 CAPSULE | Refills: 0 | Status: SHIPPED | OUTPATIENT
Start: 2021-05-01 | End: 2021-06-01

## 2021-05-01 NOTE — TELEPHONE ENCOUNTER
Requested Prescriptions     Pending Prescriptions Disp Refills   • ORPHENADRINE CITRATE  MG Oral Tablet 12 Hr [Pharmacy Med Name: Orphenadrine Citrate Er 12hr 100 Mg Tab Lupi] 10 tablet 0     Sig: Take one tablet by mouth nightly as needed   • Masood Dave

## 2021-05-01 NOTE — TELEPHONE ENCOUNTER
From: Ivania Zelaya  To: Aleida Peng MD  Sent: 4/30/2021 10:15 PM CDT  Subject: Non-Urgent Medical Question    Dr. Pinky Scott,    For the past couple of month my tennis elbow has gotten really bad.  It has been a few years since I experienced pain in m

## 2021-05-20 ENCOUNTER — TELEMEDICINE (OUTPATIENT)
Dept: FAMILY MEDICINE CLINIC | Facility: CLINIC | Age: 41
End: 2021-05-20

## 2021-05-20 DIAGNOSIS — J01.90 ACUTE SINUSITIS, RECURRENCE NOT SPECIFIED, UNSPECIFIED LOCATION: Primary | ICD-10-CM

## 2021-05-20 PROCEDURE — 99213 OFFICE O/P EST LOW 20 MIN: CPT | Performed by: FAMILY MEDICINE

## 2021-05-20 RX ORDER — CEFDINIR 300 MG/1
300 CAPSULE ORAL 2 TIMES DAILY
Qty: 20 CAPSULE | Refills: 0 | Status: SHIPPED | OUTPATIENT
Start: 2021-05-20 | End: 2021-05-30

## 2021-05-20 NOTE — PATIENT INSTRUCTIONS
Thank you for choosing Katie Solis MD at [place] [address]  To Do: Viri Cooney  1. Please take meds as directed. [facility] is located in [address]. Monday, Tuesday & Friday – 8 a.m. to 4 p.m. Wednesday, Thursday – 7 a.m. to 3 p. outweigh those potential risks and we strive to make you healthier and to improve your quality of life.     Referrals, and Radiology Information:    If your insurance requires a referral to a specialist, please allow 5 business days to process your referral

## 2021-05-20 NOTE — PROGRESS NOTES
HPI/Subjective:   Patient ID: Ivania Zelaya is a 36year old female. HPI  Ms. Cindy Bonilla is a pleasant 60-year-old female presenting for a video visit today for sinus congestion for the past 3 days.  This is associated with ear pain and ear congestion and Allergies:No Known Allergies    Objective:   Physical Exam  Constitutional:       Comments: She is alert, coherent and comfortable is able speak in full sentences with ease. Neurological:      Mental Status: She is alert.          Assessment & Plan:

## 2021-06-01 RX ORDER — GABAPENTIN 300 MG/1
CAPSULE ORAL
Qty: 90 CAPSULE | Refills: 0 | Status: SHIPPED | OUTPATIENT
Start: 2021-06-01

## 2021-06-01 RX ORDER — TEMAZEPAM 30 MG/1
CAPSULE ORAL
Qty: 30 CAPSULE | Refills: 0 | Status: SHIPPED | OUTPATIENT
Start: 2021-06-01 | End: 2021-06-28

## 2021-06-24 ENCOUNTER — OFFICE VISIT (OUTPATIENT)
Dept: ORTHOPEDICS CLINIC | Facility: CLINIC | Age: 41
End: 2021-06-24
Payer: COMMERCIAL

## 2021-06-24 VITALS — HEART RATE: 81 BPM | OXYGEN SATURATION: 100 %

## 2021-06-24 DIAGNOSIS — M77.11 LATERAL EPICONDYLITIS OF RIGHT ELBOW: Primary | ICD-10-CM

## 2021-06-24 PROCEDURE — 20551 NJX 1 TENDON ORIGIN/INSJ: CPT | Performed by: ORTHOPAEDIC SURGERY

## 2021-06-24 PROCEDURE — 99203 OFFICE O/P NEW LOW 30 MIN: CPT | Performed by: ORTHOPAEDIC SURGERY

## 2021-06-24 RX ORDER — TRIAMCINOLONE ACETONIDE 40 MG/ML
40 INJECTION, SUSPENSION INTRA-ARTICULAR; INTRAMUSCULAR ONCE
Status: COMPLETED | OUTPATIENT
Start: 2021-06-24 | End: 2021-06-24

## 2021-06-24 RX ADMIN — TRIAMCINOLONE ACETONIDE 40 MG: 40 INJECTION, SUSPENSION INTRA-ARTICULAR; INTRAMUSCULAR at 09:46:00

## 2021-06-25 NOTE — TELEPHONE ENCOUNTER
Requesting Temazepam 30mg  LOV: 5/20/21  RTC: prn  Last Relevant Labs:   Filled: 6/1/21 #30 with 0 refills    Requesting Meloxicam 7.5mg  LOV: 5/20/21  RTC: prn  Last Relevant Labs:   Filled: 12/29/2020 #90 with 0 refills    Future Appointments   Date Time

## 2021-06-28 RX ORDER — MELOXICAM 7.5 MG/1
TABLET ORAL
Qty: 90 TABLET | Refills: 0 | Status: SHIPPED | OUTPATIENT
Start: 2021-06-28 | End: 2021-08-31

## 2021-06-28 RX ORDER — TEMAZEPAM 30 MG/1
CAPSULE ORAL
Qty: 30 CAPSULE | Refills: 0 | Status: SHIPPED | OUTPATIENT
Start: 2021-06-28 | End: 2021-08-02

## 2021-07-08 NOTE — PATIENT INSTRUCTIONS
-- DO NOT REPLY / DO NOT REPLY ALL --  -- Message is from the [facility]--    Patient is requesting a medication refill - medication is on active medication list    Patient is currently OUT of the requested medication.    Was Medication Pended?  Yes.    Rx Name and Dose:  HYDROcodone-acetaminophen (NORCO) 5-325 MG per tablet    Duration: 30 days    Pharmacy  [id number] - [place], [place] - [address]    Patient confirmed the above pharmacy as correct?  Yes    Caller Information       Type Contact Phone    07/08/2021 06:40 PM CDT Phone (Incoming) Anton Landin (Self) [phone] (M)          Alternative phone number: patient says he will be back in august and will make an appointment to see doctor     Turnaround time given to caller:   \"This message will be sent to [state Provider's name]. The clinical team will fulfill your request as soon as they review your message when the office opens tomorrow.\"   Thank you for choosing Lelia Cuadra PA-C at [place] [address]  To Do: Luanne Cooney  1. Begin medication as prescribed  2. Follow-up if symptoms persist or increase  3.  Hold meloxicam while on steroid    • Please signup for MY CHART, which is el insurance company approved your testing, please call Central Scheduling at [phone]  Please allow our office 5 business days to contact you regarding any testing results.     Refill policies:   Allow 3 business days for refills; controlled substances ma

## 2021-07-25 NOTE — H&P
[facility] Note    CC: Right elbow pain    HPI: This 39year old left-hand-dominant female presents with right elbow pain that started about 1 year ago. It flared up about 6 months ago. Her pain is moderate to severe. Pain is worse at night.   She Tab TAKE 1 TABLET(10 MG) BY MOUTH DAILY 90 tablet 3   • TEMAZEPAM 30 MG Oral Cap TAKE ONE CAPSULE BY MOUTH NIGHTLY AT BEDTIME AS NEEDED FOR SLEEP 30 capsule 0   • MELOXICAM 7.5 MG Oral Tab TAKE ONE TABLET BY MOUTH ONCE DAILY 90 tablet 0     No Known Allerg the Tenex procedure. The patient understood and agreed with the plan. Teja Nguyen MD  Hand, Wrist, & Elbow Surgery  [facility]  [facility], [address] [facility], [address] [place] [address]  [email or web address]  t: [id number]

## 2021-07-25 NOTE — PROCEDURES
Written consent was obtained. Skin was prepped with ChloraPrep. 1 mL of 40 mg of Kenalog and 1 mL of 1% lidocaine was injected into the area of ECRB of origin. Patient tolerated the procedure. No complications were encountered. Band-Aid was applied.

## 2021-07-26 NOTE — H&P
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 They understand and agree to proceed with plan of care. [ x ] I have reviewed the History and Physical done within the last 30 days. Any changes noted above. Rituxan Counseling:  I discussed with the patient the risks of Rituxan infusions. Side effects can include infusion reactions, severe drug rashes including mucocutaneous reactions, reactivation of latent hepatitis and other infections and rarely progressive multifocal leukoencephalopathy.  All of the patient's questions and concerns were addressed.

## 2021-08-02 RX ORDER — MONTELUKAST SODIUM 10 MG/1
TABLET ORAL
Qty: 90 TABLET | Refills: 3 | Status: SHIPPED | OUTPATIENT
Start: 2021-08-02

## 2021-08-02 RX ORDER — TEMAZEPAM 30 MG/1
CAPSULE ORAL
Qty: 30 CAPSULE | Refills: 0 | Status: SHIPPED | OUTPATIENT
Start: 2021-08-02 | End: 2021-08-18

## 2021-08-02 NOTE — TELEPHONE ENCOUNTER
Asthma & COPD Medication Protocol [id number]/01/2021 09:21 PM   Asthma Action Score greater than or equal to 20    AAP/ACT given in last 12 months    Appointment in past 6 or next 3 months      Refill protocol failed because the patient did not meet the prot

## 2021-08-18 ENCOUNTER — OFFICE VISIT (OUTPATIENT)
Dept: FAMILY MEDICINE CLINIC | Facility: CLINIC | Age: 41
End: 2021-08-18
Payer: COMMERCIAL

## 2021-08-18 VITALS
DIASTOLIC BLOOD PRESSURE: 80 MMHG | RESPIRATION RATE: 16 BRPM | HEART RATE: 82 BPM | TEMPERATURE: 98 F | HEIGHT: 65 IN | SYSTOLIC BLOOD PRESSURE: 124 MMHG | WEIGHT: 232 LBS | OXYGEN SATURATION: 99 % | BODY MASS INDEX: 38.65 KG/M2

## 2021-08-18 DIAGNOSIS — Z00.00 WELLNESS EXAMINATION: Primary | ICD-10-CM

## 2021-08-18 DIAGNOSIS — F51.01 PRIMARY INSOMNIA: ICD-10-CM

## 2021-08-18 DIAGNOSIS — F41.9 ANXIETY: ICD-10-CM

## 2021-08-18 DIAGNOSIS — Z12.31 ENCOUNTER FOR SCREENING MAMMOGRAM FOR MALIGNANT NEOPLASM OF BREAST: ICD-10-CM

## 2021-08-18 PROCEDURE — 3074F SYST BP LT 130 MM HG: CPT | Performed by: FAMILY MEDICINE

## 2021-08-18 PROCEDURE — 3008F BODY MASS INDEX DOCD: CPT | Performed by: FAMILY MEDICINE

## 2021-08-18 PROCEDURE — 99213 OFFICE O/P EST LOW 20 MIN: CPT | Performed by: FAMILY MEDICINE

## 2021-08-18 PROCEDURE — 3079F DIAST BP 80-89 MM HG: CPT | Performed by: FAMILY MEDICINE

## 2021-08-18 PROCEDURE — 99396 PREV VISIT EST AGE 40-64: CPT | Performed by: FAMILY MEDICINE

## 2021-08-18 RX ORDER — ZOLPIDEM TARTRATE 10 MG/1
10 TABLET ORAL NIGHTLY
Qty: 90 TABLET | Refills: 3 | Status: SHIPPED | OUTPATIENT
Start: 2021-08-18 | End: 2022-08-13

## 2021-08-18 RX ORDER — ALPRAZOLAM 0.25 MG/1
0.25 TABLET ORAL DAILY PRN
Qty: 30 TABLET | Refills: 1 | Status: SHIPPED | OUTPATIENT
Start: 2021-08-18

## 2021-08-18 NOTE — PATIENT INSTRUCTIONS
Thank you for choosing Viji Lawrence MD at [place] [address]  To Do: Yaz Cooney  1. Please see age appropriate health prevention below    [facility] is located in [address]. Monday, Tuesday & Friday – 8 a.m. to 4 p.m.   Wednesday, [employer] the benefits outweigh those potential risks and we strive to make you healthier and to improve your quality of life.     Referrals, and Radiology Information:    If your insurance requires a referral to a specialist, please allow 5 business days to process age who are overweight or obese and have 1 or more additional risk factors for diabetes At least every 3 years1   Type 2 diabetes or prediabetes All women diagnosed with gestational diabetes Lifelong testing every 3 years   Type 2 diabetes All women with p this age group At routine exams   Gonorrhea Sexually active women at increased risk for infection At routine exams   Hepatitis C Anyone at increased risk; 1 time for those born between [facility] At routine exams   High cholesterol or triglycerides All Pneumococcal conjugate vaccine (PCV13) and pneumococcal polysaccharide vaccine (PPSV23) Women at increased risk for infection–talk with your healthcare provider 1 or 2 doses   Tetanus/diphtheria/pertussis (Td/Tdap) booster All women in this age group A 1

## 2021-08-18 NOTE — PROGRESS NOTES
Wellness Exam    REASON FOR VISIT:    Ga Benton is a 39year old female who presents for an [address]     Current Complaints: Ms. Jose Garcia is here for her wellness exam  Flu shot: see immunization record  Health Maintenance Topics with d your drinking?: No    Guilty: Have you ever felt bad or Guilty about your drinking?: No    Eye Opener: Have you ever had a drink first thing in the morning to steady your nerves or to get rid of a hangover (Eye opener)?: No    Scoring  Total Score: 0 GONOCOCCUS    HIV Screening For all adults age 22-65, older adults at increased risk No results found for: HIV    Syphilis Screening Screen if pregnant or high risk No results found for: RPR    Hepatitis C Screening Screen those at high risk plus screen on Stent Removal   • TONSILLECTOMY     • TUBAL LIGATION        Family History   Problem Relation Age of Onset   • Diabetes Mother       SOCIAL HISTORY:   Social History    Tobacco Use      Smoking status: Never Smoker      Smokeless tobacco: Never Used    Vap wheezes. She has no rales. Abdominal: Soft. Bowel sounds are normal. There is no tenderness. Musculoskeletal: Normal range of motion. She exhibits no edema. Lymphadenopathy:    She has no cervical adenopathy or supraclavicular adenopathy.    Neurologi errors during dictation discrepancies may still exist            Orders Placed This Encounter      CBC With Differential With Platelet      Comp Metabolic Panel (14)      Lipid Panel      TSH W Reflex To Free T4      Imaging & Consults:  TREY AKHTAR 2D+3D SCR extremity     Umbilical hernia     Primary insomnia     Anxiety and depression     Hemorrhagic cyst of ovary     DDD (degenerative disc disease), cervical     Cervical disc herniation     Cervical radiculopathy     Anxiety     Foraminal stenosis of cervica

## 2021-08-27 ENCOUNTER — PATIENT MESSAGE (OUTPATIENT)
Dept: ORTHOPEDICS CLINIC | Facility: CLINIC | Age: 41
End: 2021-08-27

## 2021-08-30 NOTE — TELEPHONE ENCOUNTER
Candie Calderon., ROMEL  You 30 minutes ago (4:06 PM)     I sent her a personal note but think she should be seen.  You can put her in this week somewhere. Thanks,   JF      Verified voicemail reached with verbal consent signed.  Attempted to call Maria Elena Preston

## 2021-08-31 RX ORDER — METHOCARBAMOL 750 MG/1
TABLET, FILM COATED ORAL
Qty: 60 TABLET | Refills: 0 | Status: SHIPPED | OUTPATIENT
Start: 2021-08-31

## 2021-08-31 RX ORDER — MELOXICAM 7.5 MG/1
TABLET ORAL
Qty: 90 TABLET | Refills: 0 | Status: SHIPPED | OUTPATIENT
Start: 2021-08-31 | End: 2021-11-29

## 2021-08-31 RX ORDER — VENLAFAXINE HYDROCHLORIDE 75 MG/1
CAPSULE, EXTENDED RELEASE ORAL
Qty: 90 CAPSULE | Refills: 0 | Status: SHIPPED | OUTPATIENT
Start: 2021-08-31 | End: 2021-11-29

## 2021-08-31 RX ORDER — VENLAFAXINE HYDROCHLORIDE 150 MG/1
CAPSULE, EXTENDED RELEASE ORAL
Qty: 90 CAPSULE | Refills: 0 | Status: SHIPPED | OUTPATIENT
Start: 2021-08-31 | End: 2021-11-29

## 2021-08-31 NOTE — TELEPHONE ENCOUNTER
Patient scheduled.   Future Appointments   Date Time Provider Emma Kavitha   9/3/2021 11:00 AM Alfredo Weiss DPM [facility] [place]

## 2021-09-03 ENCOUNTER — OFFICE VISIT (OUTPATIENT)
Dept: ORTHOPEDICS CLINIC | Facility: CLINIC | Age: 41
End: 2021-09-03
Payer: COMMERCIAL

## 2021-09-03 VITALS — WEIGHT: 215 LBS | HEIGHT: 65 IN | BODY MASS INDEX: 35.82 KG/M2

## 2021-09-03 DIAGNOSIS — M72.2 PLANTAR FASCIITIS: Primary | ICD-10-CM

## 2021-09-03 DIAGNOSIS — M67.88 ACHILLES TENDINOSIS OF RIGHT LOWER EXTREMITY: ICD-10-CM

## 2021-09-03 PROCEDURE — 3008F BODY MASS INDEX DOCD: CPT | Performed by: PODIATRIST

## 2021-09-03 PROCEDURE — 99213 OFFICE O/P EST LOW 20 MIN: CPT | Performed by: PODIATRIST

## 2021-09-03 NOTE — PROGRESS NOTES
[facility] Progress Note    Subjective:   Taryn Dove is up today with a new issue involving her right Achilles tendon. She has felt an enlargement or a bump in the back of the tendon over the last several weeks with no injury.   She had been dealing w

## 2021-09-08 ENCOUNTER — PATIENT MESSAGE (OUTPATIENT)
Dept: ORTHOPEDICS CLINIC | Facility: CLINIC | Age: 41
End: 2021-09-08

## 2021-09-09 NOTE — TELEPHONE ENCOUNTER
Antwan Sommers., ROMEL 9/9/2021 12:08 PM CDT    You can tell her that the swelling is probably from inactivity in the boot. That is not unusual.  JF  ----- Message -----  From: Gwendolyn Sutton RN  Sent: 9/8/2021 3:46 PM CDT  To: Nahum Joseph.  KRISS Taylor

## 2021-09-16 ENCOUNTER — HOSPITAL ENCOUNTER (OUTPATIENT)
Dept: MRI IMAGING | Age: 41
Discharge: HOME | End: 2021-09-16
Attending: PODIATRIST
Payer: COMMERCIAL

## 2021-09-16 DIAGNOSIS — M67.88 ACHILLES TENDINOSIS OF RIGHT LOWER EXTREMITY: ICD-10-CM

## 2021-09-16 DIAGNOSIS — M72.2 PLANTAR FASCIITIS: ICD-10-CM

## 2021-09-16 PROCEDURE — 73721 MRI JNT OF LWR EXTRE W/O DYE: CPT | Performed by: PODIATRIST

## 2021-10-07 ENCOUNTER — PATIENT MESSAGE (OUTPATIENT)
Dept: FAMILY MEDICINE CLINIC | Facility: CLINIC | Age: 41
End: 2021-10-07

## 2021-10-08 NOTE — TELEPHONE ENCOUNTER
From: Equilla File  To: Tano Anderson MD  Sent: 10/7/2021 7:39 PM CDT  Subject: Vertigo    For the past couple of weeks k have this sudden sense that I am going to trip when I am standing. I am about to walk and then I feel that I am going to trip.  [place]

## 2021-11-29 RX ORDER — VENLAFAXINE HYDROCHLORIDE 150 MG/1
CAPSULE, EXTENDED RELEASE ORAL
Qty: 90 CAPSULE | Refills: 0 | Status: SHIPPED | OUTPATIENT
Start: 2021-11-29

## 2021-11-29 RX ORDER — VENLAFAXINE HYDROCHLORIDE 75 MG/1
CAPSULE, EXTENDED RELEASE ORAL
Qty: 90 CAPSULE | Refills: 0 | Status: SHIPPED | OUTPATIENT
Start: 2021-11-29

## 2021-11-29 RX ORDER — MELOXICAM 7.5 MG/1
TABLET ORAL
Qty: 90 TABLET | Refills: 0 | Status: SHIPPED | OUTPATIENT
Start: 2021-11-29

## 2022-01-19 ENCOUNTER — TELEMEDICINE (OUTPATIENT)
Dept: FAMILY MEDICINE CLINIC | Facility: CLINIC | Age: 42
End: 2022-01-19

## 2022-01-19 DIAGNOSIS — K21.9 GASTROESOPHAGEAL REFLUX DISEASE WITHOUT ESOPHAGITIS: Primary | ICD-10-CM

## 2022-01-19 PROCEDURE — 99213 OFFICE O/P EST LOW 20 MIN: CPT | Performed by: FAMILY MEDICINE

## 2022-01-19 RX ORDER — OMEPRAZOLE 40 MG/1
40 CAPSULE, DELAYED RELEASE ORAL DAILY
Qty: 90 CAPSULE | Refills: 3 | Status: SHIPPED | OUTPATIENT
Start: 2022-01-19 | End: 2023-01-14

## 2022-01-19 NOTE — PROGRESS NOTES
Subjective:   Patient ID: Kia España is a 39year old female. HPI  Ms. Luis Longo is a pleasant 44-year-old female presenting for video visit today.   Since Monday of this week she has been experiencing heartburn described further as burning sensation i Physical Exam  She is alert, coherent and comfortable is able speak in full sentences with ease.   Assessment & Plan:   Gastroesophageal reflux disease without esophagitis  (primary encounter diagnosis)  -We will put on trial of omeprazole 40 mg daily; el

## 2022-01-19 NOTE — PATIENT INSTRUCTIONS
Thank you for choosing Abdelrahman Stock MD at [place] [address]  To Do: [place] Co[place]  1. Please take meds as directed. [facility] is located in [address]. Monday, Tuesday & Friday – 8 a.m. to 4 p.m. Wednesday, Thursday – 7 a.m. to 3 p. outweigh those potential risks and we strive to make you healthier and to improve your quality of life.     Referrals, and Radiology Information:    If your insurance requires a referral to a specialist, please allow 5 business days to process your referral a wedge under your mattress to raise it.    Other changes  · Lose weight, if you need to  · Don’t exercise near bedtime  · Don't wear tight-fitting clothes  · Limit aspirin and ibuprofen  · Stop smoking    Merrill last reviewed this educational content on

## 2022-02-02 ENCOUNTER — HOSPITAL ENCOUNTER (OUTPATIENT)
Dept: MAMMOGRAPHY | Age: 42
Discharge: HOME | End: 2022-02-02
Attending: FAMILY MEDICINE
Payer: COMMERCIAL

## 2022-02-02 DIAGNOSIS — Z12.31 ENCOUNTER FOR SCREENING MAMMOGRAM FOR MALIGNANT NEOPLASM OF BREAST: ICD-10-CM

## 2022-02-02 PROCEDURE — 77067 SCR MAMMO BI INCL CAD: CPT | Performed by: FAMILY MEDICINE

## 2022-02-02 PROCEDURE — 77063 BREAST TOMOSYNTHESIS BI: CPT | Performed by: FAMILY MEDICINE

## 2022-02-03 ENCOUNTER — PATIENT MESSAGE (OUTPATIENT)
Dept: FAMILY MEDICINE CLINIC | Facility: CLINIC | Age: 42
End: 2022-02-03

## 2022-02-03 NOTE — TELEPHONE ENCOUNTER
From: Dorothy Angulo  To: Savita Dallas MD  Sent: 2/3/2022 10:43 AM CST  Subject: Question regarding TREY HERMILO 2D+3D SCREENING BILAT (CPT=77067/32698)    Regarding the mammogram, could I call to schedule the next appointment?  Is there anything I should be concerned about regarding my first test?     Thank you,  Darya Ramos

## 2022-02-18 ENCOUNTER — HOSPITAL ENCOUNTER (OUTPATIENT)
Dept: ULTRASOUND IMAGING | Age: 42
Discharge: HOME | End: 2022-02-18
Attending: FAMILY MEDICINE
Payer: COMMERCIAL

## 2022-02-18 DIAGNOSIS — R92.2 INCONCLUSIVE MAMMOGRAM: ICD-10-CM

## 2022-02-18 PROCEDURE — 76641 ULTRASOUND BREAST COMPLETE: CPT | Performed by: FAMILY MEDICINE

## 2022-02-28 RX ORDER — VENLAFAXINE HYDROCHLORIDE 150 MG/1
CAPSULE, EXTENDED RELEASE ORAL
Qty: 90 CAPSULE | Refills: 0 | Status: SHIPPED | OUTPATIENT
Start: 2022-02-28

## 2022-02-28 RX ORDER — VENLAFAXINE HYDROCHLORIDE 75 MG/1
CAPSULE, EXTENDED RELEASE ORAL
Qty: 90 CAPSULE | Refills: 0 | Status: SHIPPED | OUTPATIENT
Start: 2022-02-28

## 2022-02-28 RX ORDER — MELOXICAM 7.5 MG/1
TABLET ORAL
Qty: 90 TABLET | Refills: 0 | Status: SHIPPED | OUTPATIENT
Start: 2022-02-28

## 2022-03-18 ENCOUNTER — TELEMEDICINE (OUTPATIENT)
Dept: FAMILY MEDICINE CLINIC | Facility: CLINIC | Age: 42
End: 2022-03-18

## 2022-03-18 DIAGNOSIS — J40 BRONCHITIS: Primary | ICD-10-CM

## 2022-03-18 PROCEDURE — 99213 OFFICE O/P EST LOW 20 MIN: CPT | Performed by: FAMILY MEDICINE

## 2022-03-18 RX ORDER — METHYLPREDNISOLONE 4 MG/1
TABLET ORAL
Qty: 21 EACH | Refills: 0 | Status: SHIPPED | OUTPATIENT
Start: 2022-03-18

## 2022-04-05 ENCOUNTER — PATIENT MESSAGE (OUTPATIENT)
Dept: ORTHOPEDICS CLINIC | Facility: CLINIC | Age: 42
End: 2022-04-05

## 2022-04-05 NOTE — TELEPHONE ENCOUNTER
From: Benjamin Hector  To: Isabela Knight MD  Sent: 4/5/2022 8:30 AM CDT  Subject: Tennis Elbow returned    Hello,  I looked back and saw it has been almost a year since I had the injection in my right elbow. The last week I have felt the pain coming back. Do you recommend waiting to make an appointment to see if it goes away or heals on its own, or based on past experience make an appt? What would be the next steps?    Thank you,   Ishan Huynh

## 2022-04-05 NOTE — TELEPHONE ENCOUNTER
Advised pt to schedule an appt for further evaluation   LOV 6/24/21   Assessment/Plan:  39year old with right lateral epicondylitis that has failed braces, oral steroids, NSAIDs, and rest.  Recommend trying a corticosteroid injection. If this fails to provide durable pain relief will recommend the patient to radiology for the Tenex procedure. The patient understood and agreed with the plan.

## 2022-04-11 RX ORDER — METHOCARBAMOL 750 MG/1
TABLET, FILM COATED ORAL
Qty: 60 TABLET | Refills: 0 | Status: SHIPPED | OUTPATIENT
Start: 2022-04-11

## 2022-04-13 ENCOUNTER — PATIENT MESSAGE (OUTPATIENT)
Dept: SURGERY | Facility: CLINIC | Age: 42
End: 2022-04-13

## 2022-04-14 NOTE — TELEPHONE ENCOUNTER
From: Kam Bill  To: Urbano Mcpherson MD  Sent: 4/13/2022 10:06 PM CDT  Subject: Neck pain    Hello, right before the pandemic I had my disc fusion. Everything went well. But about 2 weeks ago I started getting pain in my shoulders. Then this week I started having pain at top of my spine and right at the skull. It feels like swollen lymph nodes. I am not sure if this is nerves again or something completely different.    Thank you,  Yakelin Martel

## 2022-04-14 NOTE — TELEPHONE ENCOUNTER
S:  Noted that patient has messaged with an update. B:  Patient underwent surgery:  C5-6 ACDF 2/7/2020 Dr. Rob Damon. A:  Called patient to discuss her concerns, reached voicemail. Left detailed message on personalized voicemail. MyChart message sent to patient as well. R:  Awaiting feedback from patient. Reminder placed to conduct additional outreach if needed.

## 2022-04-29 ENCOUNTER — OFFICE VISIT (OUTPATIENT)
Dept: SURGERY | Facility: CLINIC | Age: 42
End: 2022-04-29
Payer: COMMERCIAL

## 2022-04-29 VITALS
SYSTOLIC BLOOD PRESSURE: 130 MMHG | HEART RATE: 80 BPM | DIASTOLIC BLOOD PRESSURE: 80 MMHG | BODY MASS INDEX: 38.65 KG/M2 | WEIGHT: 232 LBS | HEIGHT: 65 IN

## 2022-04-29 DIAGNOSIS — M54.81 OCCIPITAL NEURALGIA OF RIGHT SIDE: ICD-10-CM

## 2022-04-29 DIAGNOSIS — M50.30 DDD (DEGENERATIVE DISC DISEASE), CERVICAL: Primary | ICD-10-CM

## 2022-04-29 DIAGNOSIS — Z98.1 S/P CERVICAL SPINAL FUSION: ICD-10-CM

## 2022-04-29 PROCEDURE — 3075F SYST BP GE 130 - 139MM HG: CPT | Performed by: PHYSICIAN ASSISTANT

## 2022-04-29 PROCEDURE — 3079F DIAST BP 80-89 MM HG: CPT | Performed by: PHYSICIAN ASSISTANT

## 2022-04-29 PROCEDURE — 99214 OFFICE O/P EST MOD 30 MIN: CPT | Performed by: PHYSICIAN ASSISTANT

## 2022-04-29 PROCEDURE — 3008F BODY MASS INDEX DOCD: CPT | Performed by: PHYSICIAN ASSISTANT

## 2022-04-29 RX ORDER — METHYLPREDNISOLONE 4 MG/1
TABLET ORAL
Qty: 1 EACH | Refills: 0 | Status: SHIPPED | OUTPATIENT
Start: 2022-04-29

## 2022-04-29 NOTE — PROGRESS NOTES
States she having pain at the top of her skull and it on the right side and goes down to her shoulders

## 2022-05-09 RX ORDER — ZOLPIDEM TARTRATE 10 MG/1
10 TABLET ORAL NIGHTLY
Qty: 90 TABLET | Refills: 0 | Status: SHIPPED | OUTPATIENT
Start: 2022-05-09 | End: 2023-05-04

## 2022-05-13 ENCOUNTER — OFFICE VISIT (OUTPATIENT)
Dept: ORTHOPEDICS CLINIC | Facility: CLINIC | Age: 42
End: 2022-05-13
Payer: COMMERCIAL

## 2022-05-13 VITALS — HEIGHT: 65.5 IN | WEIGHT: 220 LBS | BODY MASS INDEX: 36.21 KG/M2

## 2022-05-13 DIAGNOSIS — M77.11 LATERAL EPICONDYLITIS OF RIGHT ELBOW: Primary | ICD-10-CM

## 2022-05-13 PROCEDURE — 99213 OFFICE O/P EST LOW 20 MIN: CPT | Performed by: ORTHOPAEDIC SURGERY

## 2022-05-13 PROCEDURE — 3008F BODY MASS INDEX DOCD: CPT | Performed by: ORTHOPAEDIC SURGERY

## 2022-05-13 PROCEDURE — 20551 NJX 1 TENDON ORIGIN/INSJ: CPT | Performed by: ORTHOPAEDIC SURGERY

## 2022-05-13 RX ORDER — BETAMETHASONE SODIUM PHOSPHATE AND BETAMETHASONE ACETATE 3; 3 MG/ML; MG/ML
6 INJECTION, SUSPENSION INTRA-ARTICULAR; INTRALESIONAL; INTRAMUSCULAR; SOFT TISSUE ONCE
Status: DISCONTINUED | OUTPATIENT
Start: 2022-05-13 | End: 2022-05-13

## 2022-05-13 RX ORDER — TRIAMCINOLONE ACETONIDE 40 MG/ML
40 INJECTION, SUSPENSION INTRA-ARTICULAR; INTRAMUSCULAR ONCE
Status: COMPLETED | OUTPATIENT
Start: 2022-05-13 | End: 2022-05-13

## 2022-05-13 RX ADMIN — TRIAMCINOLONE ACETONIDE 40 MG: 40 INJECTION, SUSPENSION INTRA-ARTICULAR; INTRAMUSCULAR at 08:00:00

## 2022-05-30 RX ORDER — METHOCARBAMOL 750 MG/1
TABLET, FILM COATED ORAL
Qty: 60 TABLET | Refills: 0 | Status: SHIPPED | OUTPATIENT
Start: 2022-05-30

## 2022-05-30 RX ORDER — VENLAFAXINE HYDROCHLORIDE 75 MG/1
CAPSULE, EXTENDED RELEASE ORAL
Qty: 90 CAPSULE | Refills: 0 | Status: SHIPPED | OUTPATIENT
Start: 2022-05-30

## 2022-05-30 RX ORDER — VENLAFAXINE HYDROCHLORIDE 150 MG/1
CAPSULE, EXTENDED RELEASE ORAL
Qty: 90 CAPSULE | Refills: 0 | Status: SHIPPED | OUTPATIENT
Start: 2022-05-30

## 2022-06-16 ENCOUNTER — TELEMEDICINE (OUTPATIENT)
Dept: FAMILY MEDICINE CLINIC | Facility: CLINIC | Age: 42
End: 2022-06-16

## 2022-06-16 DIAGNOSIS — J01.90 ACUTE SINUSITIS, RECURRENCE NOT SPECIFIED, UNSPECIFIED LOCATION: Primary | ICD-10-CM

## 2022-06-16 PROCEDURE — 99213 OFFICE O/P EST LOW 20 MIN: CPT | Performed by: FAMILY MEDICINE

## 2022-06-16 RX ORDER — AZITHROMYCIN 250 MG/1
TABLET, FILM COATED ORAL
Qty: 6 TABLET | Refills: 0 | Status: SHIPPED | OUTPATIENT
Start: 2022-06-16 | End: 2022-06-21

## 2022-06-20 ENCOUNTER — APPOINTMENT (OUTPATIENT)
Dept: CT IMAGING | Age: 42
End: 2022-06-20
Attending: EMERGENCY MEDICINE
Payer: COMMERCIAL

## 2022-06-20 ENCOUNTER — HOSPITAL ENCOUNTER (EMERGENCY)
Age: 42
Discharge: HOME | End: 2022-06-21
Attending: EMERGENCY MEDICINE
Payer: COMMERCIAL

## 2022-06-20 DIAGNOSIS — S09.90XA INJURY OF HEAD, INITIAL ENCOUNTER: Primary | ICD-10-CM

## 2022-06-20 PROCEDURE — 99284 EMERGENCY DEPT VISIT MOD MDM: CPT | Performed by: EMERGENCY MEDICINE

## 2022-06-20 PROCEDURE — 93005 ELECTROCARDIOGRAM TRACING: CPT

## 2022-06-20 PROCEDURE — 70450 CT HEAD/BRAIN W/O DYE: CPT | Performed by: EMERGENCY MEDICINE

## 2022-06-20 PROCEDURE — 93010 ELECTROCARDIOGRAM REPORT: CPT | Performed by: EMERGENCY MEDICINE

## 2022-06-21 VITALS
BODY MASS INDEX: 36.65 KG/M2 | RESPIRATION RATE: 16 BRPM | HEIGHT: 65 IN | TEMPERATURE: 98 F | OXYGEN SATURATION: 97 % | HEART RATE: 84 BPM | SYSTOLIC BLOOD PRESSURE: 121 MMHG | DIASTOLIC BLOOD PRESSURE: 73 MMHG | WEIGHT: 220 LBS

## 2022-06-21 LAB
ATRIAL RATE: 77 BPM
P AXIS: 57 DEGREES
P-R INTERVAL: 120 MS
Q-T INTERVAL: 404 MS
QRS DURATION: 88 MS
QTC CALCULATION (BEZET): 457 MS
R AXIS: 49 DEGREES
T AXIS: 40 DEGREES
VENTRICULAR RATE: 77 BPM

## 2022-06-21 NOTE — ED INITIAL ASSESSMENT (HPI)
Patient fell off her hammock around 1800 today. She states, \"I think I passed out for a couple minutes\". Nausea present, no vomiting. Headache present, last took tylenol at 2000.

## 2022-07-03 ENCOUNTER — PATIENT MESSAGE (OUTPATIENT)
Dept: ORTHOPEDICS CLINIC | Facility: CLINIC | Age: 42
End: 2022-07-03

## 2022-07-04 RX ORDER — MELOXICAM 7.5 MG/1
TABLET ORAL
Qty: 90 TABLET | Refills: 0 | Status: SHIPPED | OUTPATIENT
Start: 2022-07-04

## 2022-07-05 NOTE — TELEPHONE ENCOUNTER
Bg Rosenberg MD 7/5/2022 9:00 AM CDT    Let's have her come in to be re-eval    ----- Message -----  From: Kwesi Raza RN  Sent: 7/5/2022 8:45 AM CDT  To: Carole Rock MD  Subject: FW: Tennis/pain in elbow       ----- Message -----  From: Chaparro Enciso  Sent: 7/3/2022 5:20 PM CDT  To: [facility] Clinical [facility]  Subject: Tennis/pain in elbow     A few weeks ago there has been a bruised feeling in my right elbow but the muscle doesn't hurt its the bone area that hurts now. This is where I had the last injection but it is getting difficult to lift things again. Not sure if something else is happening or if this is my tennis elbow back in a different form. Should I be seen or wait it put?     Thank you  Tobi Napier

## 2022-07-31 RX ORDER — MONTELUKAST SODIUM 10 MG/1
TABLET ORAL
Qty: 90 TABLET | Refills: 0 | Status: SHIPPED | OUTPATIENT
Start: 2022-07-31

## 2022-08-01 RX ORDER — ZOLPIDEM TARTRATE 10 MG/1
TABLET ORAL
Qty: 90 TABLET | Refills: 0 | Status: SHIPPED | OUTPATIENT
Start: 2022-08-01

## 2022-08-01 RX ORDER — METHOCARBAMOL 750 MG/1
TABLET, FILM COATED ORAL
Qty: 60 TABLET | Refills: 0 | Status: SHIPPED | OUTPATIENT
Start: 2022-08-01

## 2022-08-12 ENCOUNTER — OFFICE VISIT (OUTPATIENT)
Dept: ORTHOPEDICS CLINIC | Facility: CLINIC | Age: 42
End: 2022-08-12
Payer: COMMERCIAL

## 2022-08-12 VITALS — HEIGHT: 65 IN | BODY MASS INDEX: 34.99 KG/M2 | WEIGHT: 210 LBS

## 2022-08-12 DIAGNOSIS — M77.11 LATERAL EPICONDYLITIS OF RIGHT ELBOW: Primary | ICD-10-CM

## 2022-08-12 PROCEDURE — 3008F BODY MASS INDEX DOCD: CPT | Performed by: ORTHOPAEDIC SURGERY

## 2022-08-12 PROCEDURE — 99213 OFFICE O/P EST LOW 20 MIN: CPT | Performed by: ORTHOPAEDIC SURGERY

## 2022-08-22 NOTE — IMAGING NOTE
DISCHARGE INSTRUCTIONS  Procedure: Ultrasound Tenotomy of the Elbow Right (Epicondylitis)  Date: 8/23/22  Radiologist: Dr Maryam Becerra instructions:  1. No driving for 24 hours after the procedure  2. Keep bandages and procedure site clean and dry for 3 days after the procedure. 3. May apply ice pack to the procedure site for 20 min as needed for discomfort  4. May take over the counter pain medication such as Tylenol or Advil as directed by the manufacture. 5. Avoid submerging in water (Swimming, Tub bath) for 5 days. 6. Radiology department will call in 2 weeks for a follow up check up  Date: 9/6/22     Time: Before 6PM  Specific patient instruction:  1. Rest your  Elbow today (day of the procedure)  2. Wear the sling for 2 weeks. May remove for bathing and sleep  3. Start daily gentle non repetitive/ non weight bearing range of motion exercises on the 3rd day post procedure  4. Make physical therapy appoint to start in a week after the procedure  5. May begin stretching exercises as directed by the Physical therapist  6. No ?lifting objections with arm/hand greater than 5 pounds for 6 weeks. 7. After 6 week follow up visit with your physician and you may resume normal activity as tolerated. Contact Radiology RN office at [phone] from 730 am-6 pm Monday thru Friday  1. If area becomes red or hot to touch  2. Increase swelling  3. For drainage from site  4. For temperature [id number]-degree F  In case of emergency, contact your physician or go to the nearest emergency department.

## 2022-08-23 ENCOUNTER — HOSPITAL ENCOUNTER (OUTPATIENT)
Dept: ULTRASOUND IMAGING | Facility: HOSPITAL | Age: 42
Discharge: HOME | End: 2022-08-23
Attending: ORTHOPAEDIC SURGERY
Payer: COMMERCIAL

## 2022-08-23 DIAGNOSIS — M77.11 LATERAL EPICONDYLITIS OF RIGHT ELBOW: ICD-10-CM

## 2022-08-23 PROCEDURE — 76942 ECHO GUIDE FOR BIOPSY: CPT | Performed by: ORTHOPAEDIC SURGERY

## 2022-08-23 PROCEDURE — 24357 REPAIR ELBOW PERC: CPT | Performed by: ORTHOPAEDIC SURGERY

## 2022-08-25 ENCOUNTER — ORDER TRANSCRIPTION (OUTPATIENT)
Dept: PHYSICAL THERAPY | Facility: HOSPITAL | Age: 42
End: 2022-08-25

## 2022-08-25 ENCOUNTER — PATIENT MESSAGE (OUTPATIENT)
Dept: PHYSICAL THERAPY | Facility: HOSPITAL | Age: 42
End: 2022-08-25

## 2022-08-25 DIAGNOSIS — M77.11 EPICONDYLITIS, LATERAL, RIGHT: Primary | ICD-10-CM

## 2022-08-28 RX ORDER — VENLAFAXINE HYDROCHLORIDE 150 MG/1
CAPSULE, EXTENDED RELEASE ORAL
Qty: 90 CAPSULE | Refills: 0 | Status: SHIPPED | OUTPATIENT
Start: 2022-08-28

## 2022-08-28 RX ORDER — VENLAFAXINE HYDROCHLORIDE 75 MG/1
CAPSULE, EXTENDED RELEASE ORAL
Qty: 90 CAPSULE | Refills: 0 | Status: SHIPPED | OUTPATIENT
Start: 2022-08-28

## 2022-08-29 DIAGNOSIS — F51.01 PRIMARY INSOMNIA: ICD-10-CM

## 2022-08-29 DIAGNOSIS — F41.9 ANXIETY: ICD-10-CM

## 2022-08-30 RX ORDER — CLONAZEPAM 1 MG/1
1 TABLET ORAL NIGHTLY PRN
Qty: 30 TABLET | Refills: 0 | Status: SHIPPED | OUTPATIENT
Start: 2022-08-30

## 2022-08-31 ENCOUNTER — TELEPHONE (OUTPATIENT)
Dept: PHYSICAL THERAPY | Facility: HOSPITAL | Age: 42
End: 2022-08-31

## 2022-09-02 ENCOUNTER — OFFICE VISIT (OUTPATIENT)
Dept: OCCUPATIONAL MEDICINE | Age: 42
End: 2022-09-02
Attending: EMERGENCY MEDICINE
Payer: COMMERCIAL

## 2022-09-02 DIAGNOSIS — M77.11 EPICONDYLITIS, LATERAL, RIGHT: ICD-10-CM

## 2022-09-02 PROCEDURE — 97110 THERAPEUTIC EXERCISES: CPT

## 2022-09-02 PROCEDURE — 97165 OT EVAL LOW COMPLEX 30 MIN: CPT

## 2022-09-09 ENCOUNTER — OFFICE VISIT (OUTPATIENT)
Dept: OCCUPATIONAL MEDICINE | Age: 42
End: 2022-09-09
Attending: EMERGENCY MEDICINE
Payer: COMMERCIAL

## 2022-09-09 DIAGNOSIS — M77.11 EPICONDYLITIS, LATERAL, RIGHT: ICD-10-CM

## 2022-09-09 PROCEDURE — 97140 MANUAL THERAPY 1/> REGIONS: CPT

## 2022-09-09 PROCEDURE — 97110 THERAPEUTIC EXERCISES: CPT

## 2022-09-13 ENCOUNTER — APPOINTMENT (OUTPATIENT)
Dept: OCCUPATIONAL MEDICINE | Age: 42
End: 2022-09-13
Attending: EMERGENCY MEDICINE
Payer: COMMERCIAL

## 2022-09-16 ENCOUNTER — APPOINTMENT (OUTPATIENT)
Dept: OCCUPATIONAL MEDICINE | Age: 42
End: 2022-09-16
Attending: EMERGENCY MEDICINE
Payer: COMMERCIAL

## 2022-09-20 ENCOUNTER — OFFICE VISIT (OUTPATIENT)
Dept: OCCUPATIONAL MEDICINE | Age: 42
End: 2022-09-20
Attending: EMERGENCY MEDICINE
Payer: COMMERCIAL

## 2022-09-20 DIAGNOSIS — M77.11 EPICONDYLITIS, LATERAL, RIGHT: ICD-10-CM

## 2022-09-20 PROCEDURE — 97140 MANUAL THERAPY 1/> REGIONS: CPT

## 2022-09-20 PROCEDURE — 97110 THERAPEUTIC EXERCISES: CPT

## 2022-09-23 ENCOUNTER — APPOINTMENT (OUTPATIENT)
Dept: OCCUPATIONAL MEDICINE | Age: 42
End: 2022-09-23
Attending: EMERGENCY MEDICINE
Payer: COMMERCIAL

## 2022-09-25 DIAGNOSIS — F51.01 PRIMARY INSOMNIA: ICD-10-CM

## 2022-09-25 DIAGNOSIS — F41.9 ANXIETY: ICD-10-CM

## 2022-09-25 RX ORDER — MELOXICAM 7.5 MG/1
TABLET ORAL
Qty: 90 TABLET | Refills: 0 | Status: SHIPPED | OUTPATIENT
Start: 2022-09-25

## 2022-09-25 RX ORDER — CLONAZEPAM 1 MG/1
1 TABLET ORAL NIGHTLY PRN
Qty: 30 TABLET | Refills: 0 | Status: SHIPPED | OUTPATIENT
Start: 2022-09-25

## 2022-09-25 RX ORDER — METHOCARBAMOL 750 MG/1
TABLET, FILM COATED ORAL
Qty: 60 TABLET | Refills: 0 | Status: SHIPPED | OUTPATIENT
Start: 2022-09-25

## 2022-09-27 ENCOUNTER — APPOINTMENT (OUTPATIENT)
Dept: OCCUPATIONAL MEDICINE | Age: 42
End: 2022-09-27
Attending: EMERGENCY MEDICINE
Payer: COMMERCIAL

## 2022-09-30 ENCOUNTER — OFFICE VISIT (OUTPATIENT)
Dept: OCCUPATIONAL MEDICINE | Age: 42
End: 2022-09-30
Attending: EMERGENCY MEDICINE
Payer: COMMERCIAL

## 2022-09-30 DIAGNOSIS — M77.11 EPICONDYLITIS, LATERAL, RIGHT: ICD-10-CM

## 2022-09-30 PROCEDURE — 97140 MANUAL THERAPY 1/> REGIONS: CPT

## 2022-09-30 PROCEDURE — 97110 THERAPEUTIC EXERCISES: CPT

## 2022-10-04 ENCOUNTER — APPOINTMENT (OUTPATIENT)
Dept: OCCUPATIONAL MEDICINE | Age: 42
End: 2022-10-04
Attending: EMERGENCY MEDICINE
Payer: COMMERCIAL

## 2022-10-07 ENCOUNTER — APPOINTMENT (OUTPATIENT)
Dept: OCCUPATIONAL MEDICINE | Age: 42
End: 2022-10-07
Attending: EMERGENCY MEDICINE
Payer: COMMERCIAL

## 2022-10-11 ENCOUNTER — APPOINTMENT (OUTPATIENT)
Dept: OCCUPATIONAL MEDICINE | Age: 42
End: 2022-10-11
Attending: EMERGENCY MEDICINE
Payer: COMMERCIAL

## 2022-10-14 ENCOUNTER — APPOINTMENT (OUTPATIENT)
Dept: OCCUPATIONAL MEDICINE | Age: 42
End: 2022-10-14
Attending: EMERGENCY MEDICINE
Payer: COMMERCIAL

## 2022-10-23 DIAGNOSIS — F41.9 ANXIETY: ICD-10-CM

## 2022-10-23 DIAGNOSIS — F51.01 PRIMARY INSOMNIA: ICD-10-CM

## 2022-10-23 RX ORDER — CLONAZEPAM 1 MG/1
TABLET ORAL
Qty: 30 TABLET | Refills: 0 | Status: SHIPPED | OUTPATIENT
Start: 2022-10-23

## 2022-10-27 ENCOUNTER — TELEPHONE (OUTPATIENT)
Dept: FAMILY MEDICINE CLINIC | Facility: CLINIC | Age: 42
End: 2022-10-27

## 2022-10-27 DIAGNOSIS — E53.8 B12 DEFICIENCY: ICD-10-CM

## 2022-10-27 DIAGNOSIS — E55.9 VITAMIN D DEFICIENCY: Primary | ICD-10-CM

## 2022-10-27 DIAGNOSIS — Z00.00 LABORATORY EXAMINATION ORDERED AS PART OF A ROUTINE GENERAL MEDICAL EXAMINATION: ICD-10-CM

## 2022-10-27 NOTE — TELEPHONE ENCOUNTER
Future Appointments   Date Time Provider [place]um[place]   11/1/2022 10:00 AM Arsen Ng MD [facility] [address] [facility] [address]     Labs placed. Patient has been notified via [employer] System[employer] reminder her to have her labs done 1-2 days prior to appt.

## 2022-10-28 ENCOUNTER — LAB ENCOUNTER (OUTPATIENT)
Dept: LAB | Age: 42
End: 2022-10-28
Attending: FAMILY MEDICINE
Payer: COMMERCIAL

## 2022-10-28 DIAGNOSIS — E55.9 VITAMIN D DEFICIENCY: ICD-10-CM

## 2022-10-28 DIAGNOSIS — Z00.00 LABORATORY EXAMINATION ORDERED AS PART OF A ROUTINE GENERAL MEDICAL EXAMINATION: ICD-10-CM

## 2022-10-28 DIAGNOSIS — E53.8 B12 DEFICIENCY: ICD-10-CM

## 2022-10-28 LAB
ALBUMIN SERPL-MCNC: 3.4 G/DL (ref 3.4–5)
ALBUMIN/GLOB SERPL: 0.9 {RATIO} (ref 1–2)
ALP LIVER SERPL-CCNC: 102 U/L
ALT SERPL-CCNC: 26 U/L
ANION GAP SERPL CALC-SCNC: 5 MMOL/L (ref 0–18)
AST SERPL-CCNC: 21 U/L (ref 15–37)
BASOPHILS # BLD AUTO: 0.06 X10(3) UL (ref 0–0.2)
BASOPHILS NFR BLD AUTO: 0.7 %
BILIRUB SERPL-MCNC: 0.8 MG/DL (ref 0.1–2)
BUN BLD-MCNC: 17 MG/DL (ref 7–18)
CALCIUM BLD-MCNC: 9 MG/DL (ref 8.5–10.1)
CHLORIDE SERPL-SCNC: 109 MMOL/L (ref 98–112)
CHOLEST SERPL-MCNC: 135 MG/DL (ref ?–200)
CO2 SERPL-SCNC: 25 MMOL/L (ref 21–32)
CREAT BLD-MCNC: 0.94 MG/DL
EOSINOPHIL # BLD AUTO: 0.06 X10(3) UL (ref 0–0.7)
EOSINOPHIL NFR BLD AUTO: 0.7 %
ERYTHROCYTE [DISTWIDTH] IN BLOOD BY AUTOMATED COUNT: 13.2 %
FASTING PATIENT LIPID ANSWER: YES
FASTING STATUS PATIENT QL REPORTED: YES
GFR SERPLBLD BASED ON 1.73 SQ M-ARVRAT: 78 ML/MIN/1.73M2 (ref 60–?)
GLOBULIN PLAS-MCNC: 3.6 G/DL (ref 2.8–4.4)
GLUCOSE BLD-MCNC: 97 MG/DL (ref 70–99)
HCT VFR BLD AUTO: 43.4 %
HDLC SERPL-MCNC: 34 MG/DL (ref 40–59)
HGB BLD-MCNC: 12.8 G/DL
IMM GRANULOCYTES # BLD AUTO: 0.04 X10(3) UL (ref 0–1)
IMM GRANULOCYTES NFR BLD: 0.5 %
LDLC SERPL CALC-MCNC: 86 MG/DL (ref ?–100)
LYMPHOCYTES # BLD AUTO: 2.19 X10(3) UL (ref 1–4)
LYMPHOCYTES NFR BLD AUTO: 27 %
MCH RBC QN AUTO: 28.6 PG (ref 26–34)
MCHC RBC AUTO-ENTMCNC: 29.5 G/DL (ref 31–37)
MCV RBC AUTO: 97.1 FL
MONOCYTES # BLD AUTO: 0.52 X10(3) UL (ref 0.1–1)
MONOCYTES NFR BLD AUTO: 6.4 %
NEUTROPHILS # BLD AUTO: 5.23 X10 (3) UL (ref 1.5–7.7)
NEUTROPHILS # BLD AUTO: 5.23 X10(3) UL (ref 1.5–7.7)
NEUTROPHILS NFR BLD AUTO: 64.7 %
NONHDLC SERPL-MCNC: 101 MG/DL (ref ?–130)
OSMOLALITY SERPL CALC.SUM OF ELEC: 289 MOSM/KG (ref 275–295)
PLATELET # BLD AUTO: 291 10(3)UL (ref 150–450)
POTASSIUM SERPL-SCNC: 4.4 MMOL/L (ref 3.5–5.1)
PROT SERPL-MCNC: 7 G/DL (ref 6.4–8.2)
RBC # BLD AUTO: 4.47 X10(6)UL
SODIUM SERPL-SCNC: 139 MMOL/L (ref 136–145)
TRIGL SERPL-MCNC: 75 MG/DL (ref 30–149)
TSI SER-ACNC: 1.72 MIU/ML (ref 0.36–3.74)
VIT B12 SERPL-MCNC: 348 PG/ML (ref 193–986)
VIT D+METAB SERPL-MCNC: 33.9 NG/ML (ref 30–100)
VLDLC SERPL CALC-MCNC: 12 MG/DL (ref 0–30)
WBC # BLD AUTO: 8.1 X10(3) UL (ref 4–11)

## 2022-10-28 PROCEDURE — 80061 LIPID PANEL: CPT

## 2022-10-28 PROCEDURE — 82607 VITAMIN B-12: CPT

## 2022-10-28 PROCEDURE — 82306 VITAMIN D 25 HYDROXY: CPT

## 2022-10-28 PROCEDURE — 84443 ASSAY THYROID STIM HORMONE: CPT

## 2022-10-28 PROCEDURE — 36415 COLL VENOUS BLD VENIPUNCTURE: CPT

## 2022-10-28 PROCEDURE — 80053 COMPREHEN METABOLIC PANEL: CPT

## 2022-10-28 PROCEDURE — 85025 COMPLETE CBC W/AUTO DIFF WBC: CPT

## 2022-11-01 ENCOUNTER — OFFICE VISIT (OUTPATIENT)
Dept: FAMILY MEDICINE CLINIC | Facility: CLINIC | Age: 42
End: 2022-11-01
Payer: COMMERCIAL

## 2022-11-01 VITALS
OXYGEN SATURATION: 98 % | WEIGHT: 236 LBS | RESPIRATION RATE: 16 BRPM | SYSTOLIC BLOOD PRESSURE: 118 MMHG | TEMPERATURE: 98 F | DIASTOLIC BLOOD PRESSURE: 80 MMHG | HEIGHT: 65 IN | HEART RATE: 92 BPM | BODY MASS INDEX: 39.32 KG/M2

## 2022-11-01 DIAGNOSIS — Z00.00 WELLNESS EXAMINATION: Primary | ICD-10-CM

## 2022-11-01 PROCEDURE — 99396 PREV VISIT EST AGE 40-64: CPT | Performed by: FAMILY MEDICINE

## 2022-11-01 PROCEDURE — 90686 IIV4 VACC NO PRSV 0.5 ML IM: CPT | Performed by: FAMILY MEDICINE

## 2022-11-01 PROCEDURE — 90471 IMMUNIZATION ADMIN: CPT | Performed by: FAMILY MEDICINE

## 2022-11-01 PROCEDURE — 3008F BODY MASS INDEX DOCD: CPT | Performed by: FAMILY MEDICINE

## 2022-11-01 PROCEDURE — 3074F SYST BP LT 130 MM HG: CPT | Performed by: FAMILY MEDICINE

## 2022-11-01 PROCEDURE — 3079F DIAST BP 80-89 MM HG: CPT | Performed by: FAMILY MEDICINE

## 2022-11-20 DIAGNOSIS — F41.9 ANXIETY: ICD-10-CM

## 2022-11-20 DIAGNOSIS — F51.01 PRIMARY INSOMNIA: ICD-10-CM

## 2022-11-21 RX ORDER — VENLAFAXINE HYDROCHLORIDE 150 MG/1
CAPSULE, EXTENDED RELEASE ORAL
Qty: 90 CAPSULE | Refills: 0 | Status: SHIPPED | OUTPATIENT
Start: 2022-11-21

## 2022-11-21 RX ORDER — CLONAZEPAM 1 MG/1
TABLET ORAL
Qty: 30 TABLET | Refills: 0 | Status: SHIPPED | OUTPATIENT
Start: 2022-11-21

## 2022-11-21 RX ORDER — MONTELUKAST SODIUM 10 MG/1
TABLET ORAL
Qty: 90 TABLET | Refills: 0 | Status: SHIPPED | OUTPATIENT
Start: 2022-11-21

## 2022-11-21 RX ORDER — VENLAFAXINE HYDROCHLORIDE 75 MG/1
CAPSULE, EXTENDED RELEASE ORAL
Qty: 90 CAPSULE | Refills: 0 | Status: SHIPPED | OUTPATIENT
Start: 2022-11-21

## 2022-11-21 RX ORDER — METHOCARBAMOL 750 MG/1
TABLET, FILM COATED ORAL
Qty: 60 TABLET | Refills: 0 | Status: SHIPPED | OUTPATIENT
Start: 2022-11-21

## 2022-12-19 ENCOUNTER — PATIENT MESSAGE (OUTPATIENT)
Dept: FAMILY MEDICINE CLINIC | Facility: CLINIC | Age: 42
End: 2022-12-19

## 2022-12-19 NOTE — TELEPHONE ENCOUNTER
From: Kam Bill  To: Marcell Diamond MD  Sent: 12/19/2022 11:13 AM CST  Subject: About my daughter William Cummings Dr. Hipolito Hager,  Not entirely sure how to do this, but my daughter sees a different doctor in your practice because it was the only one we could get her into. I would like her to see you. We talked about her anxiety and depression with the last doctor, but we didn't really establish anything. About Meño Boyers. ...she has severe anxiety. Has had it since she was little but we didn't recognize it. She doesn't like social settings, she hides in her room a lot. She won't do sleep overs with her friends because she is too self conscious about what others will think about her sleeping and eating. She doesn't use the bathroom in public places, which means she goes a very long time before using the bathroom which I don't find to be ok because she can get sick. She is nervous about all things, except for dancing. She can dance in front of people but if she even messes up a moment she will get in her head. She doesn't like to present in class. It causes anxiety attacks. I need some guidance. Would we be able to do a video chat on what we can do for her? Breathing exercises were suggested by the other doctor but they are not helping her.     Thank you,  Yakelin Martel

## 2022-12-20 DIAGNOSIS — F51.01 PRIMARY INSOMNIA: ICD-10-CM

## 2022-12-20 DIAGNOSIS — F41.9 ANXIETY: ICD-10-CM

## 2022-12-20 RX ORDER — MELOXICAM 7.5 MG/1
TABLET ORAL
Qty: 90 TABLET | Refills: 0 | Status: SHIPPED | OUTPATIENT
Start: 2022-12-20

## 2022-12-20 RX ORDER — ZOLPIDEM TARTRATE 10 MG/1
TABLET ORAL
Qty: 90 TABLET | Refills: 0 | Status: SHIPPED | OUTPATIENT
Start: 2022-12-20

## 2022-12-20 RX ORDER — CLONAZEPAM 1 MG/1
TABLET ORAL
Qty: 30 TABLET | Refills: 0 | Status: SHIPPED | OUTPATIENT
Start: 2022-12-20

## 2022-12-20 NOTE — TELEPHONE ENCOUNTER
Requesting Clonazepam 1mg  LOV: 11/1/22 Physical  RTC: 1 year  Last Relevant Labs: 10/28/22  Filled: 11/21/22 #30 with 0 refills    No future appointments.     Rx pended and routed for approval/denial

## 2023-01-13 ENCOUNTER — LAB ENCOUNTER (OUTPATIENT)
Dept: LAB | Age: 43
End: 2023-01-13
Attending: FAMILY MEDICINE
Payer: COMMERCIAL

## 2023-01-13 DIAGNOSIS — R50.9 FEVER, UNSPECIFIED FEVER CAUSE: ICD-10-CM

## 2023-01-13 DIAGNOSIS — H93.8X3 CONGESTION OF BOTH EARS: ICD-10-CM

## 2023-01-13 PROCEDURE — 87637 SARSCOV2&INF A&B&RSV AMP PRB: CPT

## 2023-01-14 LAB
FLUAV + FLUBV RNA SPEC NAA+PROBE: NOT DETECTED
FLUAV + FLUBV RNA SPEC NAA+PROBE: NOT DETECTED
RSV RNA SPEC NAA+PROBE: NOT DETECTED
SARS-COV-2 RNA RESP QL NAA+PROBE: NOT DETECTED

## 2023-01-22 DIAGNOSIS — F41.9 ANXIETY: ICD-10-CM

## 2023-01-22 DIAGNOSIS — F51.01 PRIMARY INSOMNIA: ICD-10-CM

## 2023-01-23 NOTE — TELEPHONE ENCOUNTER
Requesting Clonazepam 1mg  LOV: 11/1/22 Physical  RTC: 1 year  Last Relevant Labs: 10/28/22  Filled: 12/20/22 #30 with 0 refills    Requesting Omeprazole 40mg  LOV: 11/1/22 Physical  RTC: 1 year  Last Relevant Labs: 10/28/22  Filled: 1/19/22 #90 with 3 refills    No future appointments.     RXs pended and routed for approval/denial

## 2023-01-24 RX ORDER — OMEPRAZOLE 40 MG/1
CAPSULE, DELAYED RELEASE ORAL
Qty: 90 CAPSULE | Refills: 0 | Status: SHIPPED | OUTPATIENT
Start: 2023-01-24

## 2023-01-24 RX ORDER — CLONAZEPAM 1 MG/1
TABLET ORAL
Qty: 30 TABLET | Refills: 0 | Status: SHIPPED | OUTPATIENT
Start: 2023-01-24

## 2023-01-27 NOTE — PROGRESS NOTES
Admission medication history interview status for this patient is complete. See [facility] admission navigator for allergy information, prior to admission medications and immunization status.     Medication history interview done, indicate source(s): Patient  Medication history resources (including written lists, pill bottles, clinic record):None  Pharmacy: [facility]    Changes made to PTA medication list:  Added: none  Changed: none  Reported as Not Taking: none  Removed: none    Actions taken by pharmacist (provider contacted, etc):None     Additional medication history information: Pt states she took meds at last hospital discharge (11/21) until finished, but was unable to get refilled. Pt has not taken any meds since late December.    Medication reconciliation/reorder completed by provider prior to medication history?  N   (Y/N)         Prior to Admission medications    Medication Sig Last Dose Taking? Auth Provider Long Term End Date   busPIRone (BUSPAR) 10 MG tablet Take 1 tablet (10 mg) by mouth 3 times daily More than a month Yes Shaina Sanchez MD Yes    divalproex sodium delayed-release (DEPAKOTE) 500 MG DR tablet Take 1 tablet (500 mg) by mouth every 12 hours More than a month Yes Shaina Sanchez MD Yes    hydrOXYzine (ATARAX) 50 MG tablet Take 1 tablet (50 mg) by mouth every 4 hours as needed for anxiety More than a month Yes Shaina Sanchez MD     paliperidone ER (INVEGA) 3 MG 24 hr tablet Take 1 tablet (3 mg) by mouth At Bedtime More than a month Yes Shaina Sanchez MD Yes    traZODone (DESYREL) 50 MG tablet Take 1 tablet (50 mg) by mouth nightly as needed for sleep (may repeat after 60 minutes) More than a month Yes Shaina Sanchez MD Yes           Dx: cervical pain, stenosis, DDD, radiculopathy         Authorized # of Visits:  13         Next MD visit: neurosurgeon Amanda Flores [facility]  Fall Risk: low        Precautions: NA             Subjective:   Pt reports that she goes for a cervical x ray today charan improve cervical ROM as well as promote upright 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

## 2023-02-15 RX ORDER — MONTELUKAST SODIUM 10 MG/1
TABLET ORAL
Qty: 90 TABLET | Refills: 0 | Status: SHIPPED | OUTPATIENT
Start: 2023-02-15

## 2023-02-15 RX ORDER — VENLAFAXINE HYDROCHLORIDE 150 MG/1
CAPSULE, EXTENDED RELEASE ORAL
Qty: 90 CAPSULE | Refills: 0 | Status: SHIPPED | OUTPATIENT
Start: 2023-02-15

## 2023-02-15 RX ORDER — VENLAFAXINE HYDROCHLORIDE 75 MG/1
CAPSULE, EXTENDED RELEASE ORAL
Qty: 90 CAPSULE | Refills: 0 | Status: SHIPPED | OUTPATIENT
Start: 2023-02-15

## 2023-02-17 ENCOUNTER — ORDER TRANSCRIPTION (OUTPATIENT)
Dept: ADMINISTRATIVE | Facility: HOSPITAL | Age: 43
End: 2023-02-17

## 2023-02-17 DIAGNOSIS — Z12.31 ENCOUNTER FOR SCREENING MAMMOGRAM FOR MALIGNANT NEOPLASM OF BREAST: Primary | ICD-10-CM

## 2023-02-21 ENCOUNTER — VIRTUAL PHONE E/M (OUTPATIENT)
Dept: FAMILY MEDICINE CLINIC | Facility: CLINIC | Age: 43
End: 2023-02-21
Payer: COMMERCIAL

## 2023-02-21 DIAGNOSIS — J02.9 PHARYNGITIS, UNSPECIFIED ETIOLOGY: Primary | ICD-10-CM

## 2023-02-21 PROCEDURE — 99442 PHONE E/M BY PHYS 11-20 MIN: CPT | Performed by: FAMILY MEDICINE

## 2023-02-21 RX ORDER — AZITHROMYCIN 250 MG/1
TABLET, FILM COATED ORAL
Qty: 6 TABLET | Refills: 0 | Status: SHIPPED | OUTPATIENT
Start: 2023-02-21 | End: 2023-02-26

## 2023-02-21 NOTE — PATIENT INSTRUCTIONS
Thank you for choosing Crys Barron MD at [place] [address]  To Do: Ingrid Cooney  1. Please take meds as directed. [facility] is located in [address]. Monday, Tuesday & Friday - 8 a.m. to 4 p.m. Wednesday, Thursday - 7 a.m. to 3 p.m. The lab is closed daily from 12 p.m.-12:30 p.m. Saturday lab hours by appointment. Call [phone] to schedule the appointment. Please signup for Andre Phillipe, which is electronic access to your record if you have not done so. All your results will post on there. [email or web address]   You can NOW use Andre Phillipe to book your appointments with us, or consider using open access scheduling which is through the [facility] website [email or web address] and type in Crys Barron MD and follow the links for \"Schedule Online Now\"    To schedule Imaging or tests at [facility] Scheduling [phone], Go to [facility] A ER Building (For example: CT scans, X rays, Ultrasound, MRI)  Cardiac Testing in ER building Building A second floor Cardiac Testing [phone] (For example: Holter Monitor, Cardiac Stress tests,Event Monitor, or 2D Echocardiograms)  [facility] call [phone] usually in [facility] A  Walk in Clinic in [place] at [facility]. [address] Mon-Fri at 8am-7:30 p.m., and Sat/Sun 9:00a. m.-4:30 p.m. Also at [address]  Call [phone] for info     Please call our office about any questions regarding your treatment/medicines/tests as a result of today's visit. For your safety, read the entire package insert of all medicines prescribed to you and be aware of all of the risks of treatment even beyond those discussed today. All therapies have potential risk of harm or side effects or medication interactions.   It is your duty and for your safety to discuss with the pharmacist and our office with questions, and to notify us and stop treatment if problems arise, but know that our intention is that the benefits outweigh those potential risks and we strive to make you healthier and to improve your quality of life. Referrals, and Radiology Information:    If your insurance requires a referral to a specialist, please allow 5 business days to process your referral request.    If Toño Hart MD orders a CT or MRI, it may take up to 10 business days to receive approval from your insurance company. Once our office has called informing you that the insurance company approved your testing, please call [facility] Scheduling at [phone]  Please allow our office 5 business days to contact you regarding any testing results. Refill policies:   Allow 3 business days for refills; controlled substances may take longer and must be picked up from the office in person. Narcotic medications can only be filled in 30 day increments and must be refilled at an office visit only. If your prescription is due for a refill, you may be due for a follow-up appointment. We cannot refill your maintenance medications at a preventative wellness visit. To best provide you care, patients receiving maintenance medications need to be seen at least twice a year.

## 2023-02-21 NOTE — PROGRESS NOTES
Virtual Telephone Check-In    Mykel Ken verbally consents to a Virtual/Telephone Check-In visit on 02/21/23. Patient has been referred to the [facility] website at [email or web address] to review the yearly Consent to Treat document. Patient understands and accepts financial responsibility for any deductible, co-insurance and/or co-pays associated with this service.     Duration of the service: 15 minutes      Summary of topics discussed:             Abdelrahman Stock MD

## 2023-02-22 ENCOUNTER — HOSPITAL ENCOUNTER (OUTPATIENT)
Dept: MAMMOGRAPHY | Age: 43
Discharge: HOME | End: 2023-02-22
Attending: FAMILY MEDICINE
Payer: COMMERCIAL

## 2023-02-22 DIAGNOSIS — Z12.31 ENCOUNTER FOR SCREENING MAMMOGRAM FOR MALIGNANT NEOPLASM OF BREAST: ICD-10-CM

## 2023-02-22 PROCEDURE — 77063 BREAST TOMOSYNTHESIS BI: CPT | Performed by: FAMILY MEDICINE

## 2023-02-22 PROCEDURE — 77067 SCR MAMMO BI INCL CAD: CPT | Performed by: FAMILY MEDICINE

## 2023-03-04 DIAGNOSIS — F51.01 PRIMARY INSOMNIA: ICD-10-CM

## 2023-03-04 DIAGNOSIS — F41.9 ANXIETY: ICD-10-CM

## 2023-03-05 RX ORDER — CLONAZEPAM 1 MG/1
TABLET ORAL
Qty: 30 TABLET | Refills: 0 | Status: SHIPPED | OUTPATIENT
Start: 2023-03-05

## 2023-03-05 RX ORDER — METHOCARBAMOL 750 MG/1
TABLET, FILM COATED ORAL
Qty: 60 TABLET | Refills: 0 | Status: SHIPPED | OUTPATIENT
Start: 2023-03-05

## 2023-04-02 DIAGNOSIS — F51.01 PRIMARY INSOMNIA: ICD-10-CM

## 2023-04-02 DIAGNOSIS — F41.9 ANXIETY: ICD-10-CM

## 2023-04-03 RX ORDER — CLONAZEPAM 1 MG/1
TABLET ORAL
Qty: 30 TABLET | Refills: 0 | Status: SHIPPED | OUTPATIENT
Start: 2023-04-03

## 2023-04-21 DIAGNOSIS — F51.01 PRIMARY INSOMNIA: ICD-10-CM

## 2023-04-21 DIAGNOSIS — F41.9 ANXIETY: ICD-10-CM

## 2023-04-21 NOTE — TELEPHONE ENCOUNTER
Levo  clonazepam  monelukast  meloxicam  venlafaxine  Methocarbamol  Omeprazole    Patient needs all RX's to new pharmacy #[facility]

## 2023-04-23 RX ORDER — VENLAFAXINE HYDROCHLORIDE 75 MG/1
75 CAPSULE, EXTENDED RELEASE ORAL DAILY
Qty: 90 CAPSULE | Refills: 0 | Status: SHIPPED | OUTPATIENT
Start: 2023-04-23

## 2023-04-23 RX ORDER — METHOCARBAMOL 750 MG/1
750 TABLET, FILM COATED ORAL 3 TIMES DAILY PRN
Qty: 60 TABLET | Refills: 0 | Status: SHIPPED | OUTPATIENT
Start: 2023-04-23

## 2023-04-23 RX ORDER — CLONAZEPAM 1 MG/1
1 TABLET ORAL NIGHTLY PRN
Qty: 30 TABLET | Refills: 0 | Status: SHIPPED | OUTPATIENT
Start: 2023-04-23

## 2023-04-23 RX ORDER — MONTELUKAST SODIUM 10 MG/1
10 TABLET ORAL DAILY
Qty: 90 TABLET | Refills: 0 | Status: SHIPPED | OUTPATIENT
Start: 2023-04-23

## 2023-04-23 RX ORDER — VENLAFAXINE HYDROCHLORIDE 150 MG/1
150 CAPSULE, EXTENDED RELEASE ORAL DAILY
Qty: 90 CAPSULE | Refills: 0 | Status: SHIPPED | OUTPATIENT
Start: 2023-04-23

## 2023-04-23 RX ORDER — MELOXICAM 7.5 MG/1
7.5 TABLET ORAL DAILY
Qty: 90 TABLET | Refills: 0 | Status: SHIPPED | OUTPATIENT
Start: 2023-04-23

## 2023-04-23 RX ORDER — OMEPRAZOLE 40 MG/1
40 CAPSULE, DELAYED RELEASE ORAL DAILY
Qty: 90 CAPSULE | Refills: 0 | Status: SHIPPED | OUTPATIENT
Start: 2023-04-23

## 2023-05-04 NOTE — PROGRESS NOTES
Dx: cervical pain, stenosis, DDD, radiculopathy         Authorized # of Visits:  13         Next MD visit: none scheduled  Fall Risk: low        Precautions: NA             Subjective:   Pt reports that she has been using the TENs unit and is getting some Remote transmission received from patient's dual chamber pacemaker monitor at home. Transmission shows normal sensing and pacing function. No new arrhythmias/events recorded. Ap 99.9%   <0.1% (MVP On)  PVCs 0.4/hr    End of 91-day monitoring period 4/18/23. EP physician will review. See interrogation under cardiology tab in the [address] [place] [address] [address] field for more details. Will continue to monitor remotely. and driving   · Pt will demonstrate improved cervical intrinsic strength to 4/5 to allow improved cervical stabilization with overhead reaching (15 visits)  · Pt will improve postural strength (mid/low trap) to 4/5 to promote improved upright posturing and

## 2023-05-21 RX ORDER — VENLAFAXINE HYDROCHLORIDE 150 MG/1
CAPSULE, EXTENDED RELEASE ORAL
Qty: 90 CAPSULE | Refills: 0 | Status: SHIPPED | OUTPATIENT
Start: 2023-05-21

## 2023-05-21 RX ORDER — MONTELUKAST SODIUM 10 MG/1
TABLET ORAL
Qty: 90 TABLET | Refills: 0 | Status: SHIPPED | OUTPATIENT
Start: 2023-05-21

## 2023-05-21 RX ORDER — VENLAFAXINE HYDROCHLORIDE 75 MG/1
CAPSULE, EXTENDED RELEASE ORAL
Qty: 90 CAPSULE | Refills: 0 | Status: SHIPPED | OUTPATIENT
Start: 2023-05-21

## 2023-05-29 DIAGNOSIS — F41.9 ANXIETY: ICD-10-CM

## 2023-05-29 DIAGNOSIS — F51.01 PRIMARY INSOMNIA: ICD-10-CM

## 2023-05-30 RX ORDER — CLONAZEPAM 1 MG/1
TABLET ORAL
Qty: 30 TABLET | Refills: 0 | Status: SHIPPED | OUTPATIENT
Start: 2023-05-30

## 2023-06-22 RX ORDER — METHOCARBAMOL 750 MG/1
TABLET, FILM COATED ORAL
Qty: 60 TABLET | Refills: 0 | Status: SHIPPED | OUTPATIENT
Start: 2023-06-22

## 2023-07-03 DIAGNOSIS — F41.9 ANXIETY: ICD-10-CM

## 2023-07-03 DIAGNOSIS — F51.01 PRIMARY INSOMNIA: ICD-10-CM

## 2023-07-03 RX ORDER — CLONAZEPAM 1 MG/1
1 TABLET ORAL NIGHTLY PRN
Qty: 30 TABLET | Refills: 0 | Status: SHIPPED | OUTPATIENT
Start: 2023-07-03

## 2023-07-09 RX ORDER — MONTELUKAST SODIUM 10 MG/1
10 TABLET ORAL DAILY
Qty: 90 TABLET | Refills: 0 | Status: SHIPPED | OUTPATIENT
Start: 2023-07-09

## 2023-07-24 DIAGNOSIS — F51.01 PRIMARY INSOMNIA: ICD-10-CM

## 2023-07-24 DIAGNOSIS — F41.9 ANXIETY: ICD-10-CM

## 2023-07-24 RX ORDER — CLONAZEPAM 1 MG/1
1 TABLET ORAL NIGHTLY PRN
Qty: 90 TABLET | Refills: 0 | Status: SHIPPED | OUTPATIENT
Start: 2023-07-24

## 2023-07-24 NOTE — TELEPHONE ENCOUNTER
Requesting Clonazepam 1mg  LOV: 2/21/23 VV acute  RTC: prn  Last Relevant Labs: 10/28/22  Filled: 7/3/23 #30 with 0 refills    No future appointments.     Per [place] , last dispensed 7/3/23 #30    Pt requesting #90 - Rx pended

## 2023-07-28 ENCOUNTER — OFFICE VISIT (OUTPATIENT)
Dept: FAMILY MEDICINE CLINIC | Facility: CLINIC | Age: 43
End: 2023-07-28
Payer: COMMERCIAL

## 2023-07-28 VITALS
SYSTOLIC BLOOD PRESSURE: 120 MMHG | OXYGEN SATURATION: 98 % | RESPIRATION RATE: 16 BRPM | WEIGHT: 233 LBS | BODY MASS INDEX: 38.82 KG/M2 | HEIGHT: 65 IN | DIASTOLIC BLOOD PRESSURE: 80 MMHG | TEMPERATURE: 98 F | HEART RATE: 81 BPM

## 2023-07-28 DIAGNOSIS — M54.2 NECK PAIN: Primary | ICD-10-CM

## 2023-07-28 DIAGNOSIS — M62.838 MUSCLE SPASMS OF NECK: ICD-10-CM

## 2023-07-28 DIAGNOSIS — M62.830 PARASPINAL MUSCLE SPASM: ICD-10-CM

## 2023-07-28 PROCEDURE — 3008F BODY MASS INDEX DOCD: CPT | Performed by: FAMILY MEDICINE

## 2023-07-28 PROCEDURE — 3079F DIAST BP 80-89 MM HG: CPT | Performed by: FAMILY MEDICINE

## 2023-07-28 PROCEDURE — 99214 OFFICE O/P EST MOD 30 MIN: CPT | Performed by: FAMILY MEDICINE

## 2023-07-28 PROCEDURE — 3074F SYST BP LT 130 MM HG: CPT | Performed by: FAMILY MEDICINE

## 2023-07-28 RX ORDER — TIZANIDINE 4 MG/1
4 TABLET ORAL EVERY 6 HOURS PRN
Qty: 56 TABLET | Refills: 0 | Status: SHIPPED | OUTPATIENT
Start: 2023-07-28 | End: 2023-08-11

## 2023-07-28 RX ORDER — MELOXICAM 7.5 MG/1
7.5 TABLET ORAL DAILY
Qty: 90 TABLET | Refills: 0 | Status: SHIPPED | OUTPATIENT
Start: 2023-07-28

## 2023-07-28 NOTE — TELEPHONE ENCOUNTER
Patient is requesting a refill on: Meloxicam 7.5 mg # 30  Last LOV: Today with Bartolome Castle  Last Refill: 4/23/23    Non- protocol Medication  RX pended and routed to provider for approval

## 2023-08-04 RX ORDER — METHYLPREDNISOLONE 4 MG/1
TABLET ORAL
Qty: 21 EACH | Refills: 0 | Status: SHIPPED | OUTPATIENT
Start: 2023-08-04

## 2023-08-04 RX ORDER — MELOXICAM 15 MG/1
15 TABLET ORAL DAILY
Qty: 30 TABLET | Refills: 3 | Status: SHIPPED | OUTPATIENT
Start: 2023-08-04 | End: 2023-12-02

## 2023-08-14 ENCOUNTER — HOSPITAL ENCOUNTER (OUTPATIENT)
Dept: GENERAL RADIOLOGY | Age: 43
Discharge: HOME | End: 2023-08-14
Attending: PHYSICIAN ASSISTANT
Payer: COMMERCIAL

## 2023-08-14 ENCOUNTER — OFFICE VISIT (OUTPATIENT)
Dept: PAIN CLINIC | Facility: CLINIC | Age: 43
End: 2023-08-14
Payer: COMMERCIAL

## 2023-08-14 VITALS — HEART RATE: 88 BPM | SYSTOLIC BLOOD PRESSURE: 118 MMHG | OXYGEN SATURATION: 98 % | DIASTOLIC BLOOD PRESSURE: 70 MMHG

## 2023-08-14 DIAGNOSIS — M54.2 CERVICALGIA: ICD-10-CM

## 2023-08-14 DIAGNOSIS — M79.18 MYOFASCIAL PAIN: ICD-10-CM

## 2023-08-14 DIAGNOSIS — Z98.1 HISTORY OF FUSION OF CERVICAL SPINE: ICD-10-CM

## 2023-08-14 DIAGNOSIS — Z98.1 HISTORY OF FUSION OF CERVICAL SPINE: Primary | ICD-10-CM

## 2023-08-14 PROCEDURE — 3074F SYST BP LT 130 MM HG: CPT | Performed by: PHYSICIAN ASSISTANT

## 2023-08-14 PROCEDURE — 99214 OFFICE O/P EST MOD 30 MIN: CPT | Performed by: PHYSICIAN ASSISTANT

## 2023-08-14 PROCEDURE — 3078F DIAST BP <80 MM HG: CPT | Performed by: PHYSICIAN ASSISTANT

## 2023-08-14 PROCEDURE — 72050 X-RAY EXAM NECK SPINE 4/5VWS: CPT | Performed by: PHYSICIAN ASSISTANT

## 2023-08-21 ENCOUNTER — TELEPHONE (OUTPATIENT)
Dept: PAIN CLINIC | Facility: CLINIC | Age: 43
End: 2023-08-21

## 2023-08-21 NOTE — TELEPHONE ENCOUNTER
General 08/16/2023  9:52 AM Teodora Stark - -   Note:  Prior authorization request completed for: TPI  Authorization no auth/ pre d is needed no limition/resistance on code   Authorization dates: n/a  CPT codes approved: 45546  Number of visits/dates of service approved: 1  Physician: Marialuisa Felton  Location: office     Call Ref#: [id number]  Representative Name: [address] [place] [address] Drive: [facility] @[phone]     Patient can be scheduled. Routed to Navigator.

## 2023-08-21 NOTE — TELEPHONE ENCOUNTER
Order Questions    Question Answer Comment   Anesthesia Type Local    Provider [facility]    Location Office    Procedure Other (please add comment) left trap and rhomboid TPI   CPT (Hit enter after each entry) INJECTION; SINGLE/MULTIPLE TRIGGER POINT(S), 3> MUSCLE    Medical clearance requested (will send to Pain Navigator) No    Patient has Medicare coverage?  No

## 2023-08-21 NOTE — TELEPHONE ENCOUNTER
Patient calling office to schedule - patient scheduled for In Office left trap and rhomboid TPI on 09/11/23 @ 09:00 AM

## 2023-08-22 DIAGNOSIS — M62.838 MUSCLE SPASMS OF NECK: ICD-10-CM

## 2023-08-22 DIAGNOSIS — M62.830 PARASPINAL MUSCLE SPASM: ICD-10-CM

## 2023-08-23 RX ORDER — TIZANIDINE 4 MG/1
4 TABLET ORAL EVERY 6 HOURS PRN
Qty: 56 TABLET | Refills: 0 | Status: SHIPPED | OUTPATIENT
Start: 2023-08-23

## 2023-08-23 RX ORDER — VENLAFAXINE HYDROCHLORIDE 75 MG/1
75 CAPSULE, EXTENDED RELEASE ORAL DAILY
Qty: 90 CAPSULE | Refills: 0 | Status: SHIPPED | OUTPATIENT
Start: 2023-08-23

## 2023-08-23 RX ORDER — VENLAFAXINE HYDROCHLORIDE 150 MG/1
150 CAPSULE, EXTENDED RELEASE ORAL DAILY
Qty: 90 CAPSULE | Refills: 0 | Status: SHIPPED | OUTPATIENT
Start: 2023-08-23

## 2023-08-23 NOTE — TELEPHONE ENCOUNTER
Requesting Tizanidine 4mg  LOV: 7/28/23  RTC: prn  Last Relevant Labs: 10/28/22  Filled: 7/28/23 #56 with 0 refills    Future Appointments   Date Time Provider Emma Plummer   9/11/2023  9:00 AM [facility] PAIN NURSE [facility] [place]   9/11/2023  9:15 AM Qasim Anna MD [facility] [place]     Non-protocol med:  Rx pended and routed for approval/denial

## 2023-09-11 ENCOUNTER — NURSE ONLY (OUTPATIENT)
Dept: PAIN CLINIC | Facility: CLINIC | Age: 43
End: 2023-09-11
Payer: COMMERCIAL

## 2023-09-11 ENCOUNTER — OFFICE VISIT (OUTPATIENT)
Dept: PAIN CLINIC | Facility: CLINIC | Age: 43
End: 2023-09-11
Payer: COMMERCIAL

## 2023-09-11 VITALS — OXYGEN SATURATION: 98 % | DIASTOLIC BLOOD PRESSURE: 78 MMHG | HEART RATE: 92 BPM | SYSTOLIC BLOOD PRESSURE: 132 MMHG

## 2023-09-11 DIAGNOSIS — M79.18 MYOFASCIAL PAIN: Primary | ICD-10-CM

## 2023-09-11 RX ORDER — BUPIVACAINE HYDROCHLORIDE 5 MG/ML
9 INJECTION, SOLUTION EPIDURAL; INTRACAUDAL ONCE
Status: COMPLETED | OUTPATIENT
Start: 2023-09-11 | End: 2023-09-11

## 2023-09-11 RX ORDER — TRIAMCINOLONE ACETONIDE 40 MG/ML
40 INJECTION, SUSPENSION INTRA-ARTICULAR; INTRAMUSCULAR ONCE
Status: COMPLETED | OUTPATIENT
Start: 2023-09-11 | End: 2023-09-11

## 2023-09-11 RX ADMIN — BUPIVACAINE HYDROCHLORIDE 9 ML: 5 INJECTION, SOLUTION EPIDURAL; INTRACAUDAL at 09:18:00

## 2023-09-11 NOTE — PROCEDURES
Date of procedure: September 11, 2023        Gnosis: Cervical myofascial pain    Postop diagnosis: Same    Procedure: Left cervical TPI    Surgeon: Matt Navarro    Anesthesia: None    EBL: 0    Indication: Patient is a 51-year-old female with a history of myofascial pain    Procedure detail: Informed consent was obtained. The skin overlying the cervical spine was prepped in usual sterile fashion. Subsequently, a total mixture of 40mg triamcinolone with 9 cc of 0.5% bupivacaine was then easily injected. Muscles injected include the levator scapulae, trapezius and rhomboid. There is no subjective or objective weakness. The needle was then withdrawn tip intact. There is excellent hemostasis.     Matt Navarro MD

## 2023-09-11 NOTE — PROGRESS NOTES
Patient presents in office today with reported pain in left trap and rhomboid     Current pain level reported = 6/10    Last reported dose of Meloxicam in the morning      Narcotic Contract renewal NA    Urine Drug screen NA

## 2023-09-11 NOTE — PROGRESS NOTES
Timeout completed prior to procedure @ 6702. Participants present for timeout:  Dr. Nilson Guy , and patient.

## 2023-09-26 ENCOUNTER — OFFICE VISIT (OUTPATIENT)
Dept: FAMILY MEDICINE CLINIC | Facility: CLINIC | Age: 43
End: 2023-09-26
Payer: COMMERCIAL

## 2023-09-26 VITALS
HEIGHT: 65 IN | DIASTOLIC BLOOD PRESSURE: 70 MMHG | RESPIRATION RATE: 20 BRPM | TEMPERATURE: 98 F | SYSTOLIC BLOOD PRESSURE: 134 MMHG | WEIGHT: 229.38 LBS | BODY MASS INDEX: 38.21 KG/M2 | OXYGEN SATURATION: 99 %

## 2023-09-26 DIAGNOSIS — R59.9 LYMPH NODE ENLARGEMENT: ICD-10-CM

## 2023-09-26 DIAGNOSIS — B34.9 VIRAL ILLNESS: Primary | ICD-10-CM

## 2023-09-26 DIAGNOSIS — Z23 NEED FOR INFLUENZA VACCINATION: ICD-10-CM

## 2023-09-26 PROCEDURE — 90471 IMMUNIZATION ADMIN: CPT | Performed by: FAMILY MEDICINE

## 2023-09-26 PROCEDURE — 99213 OFFICE O/P EST LOW 20 MIN: CPT | Performed by: FAMILY MEDICINE

## 2023-09-26 PROCEDURE — 3008F BODY MASS INDEX DOCD: CPT | Performed by: FAMILY MEDICINE

## 2023-09-26 PROCEDURE — 3078F DIAST BP <80 MM HG: CPT | Performed by: FAMILY MEDICINE

## 2023-09-26 PROCEDURE — 90686 IIV4 VACC NO PRSV 0.5 ML IM: CPT | Performed by: FAMILY MEDICINE

## 2023-09-26 PROCEDURE — 3075F SYST BP GE 130 - 139MM HG: CPT | Performed by: FAMILY MEDICINE

## 2023-10-05 ENCOUNTER — PATIENT MESSAGE (OUTPATIENT)
Dept: FAMILY MEDICINE CLINIC | Facility: CLINIC | Age: 43
End: 2023-10-05

## 2023-10-05 ENCOUNTER — HOSPITAL ENCOUNTER (OUTPATIENT)
Dept: ULTRASOUND IMAGING | Age: 43
Discharge: HOME | End: 2023-10-05
Attending: FAMILY MEDICINE
Payer: COMMERCIAL

## 2023-10-05 DIAGNOSIS — R59.9 LYMPH NODE ENLARGEMENT: ICD-10-CM

## 2023-10-05 PROCEDURE — 76536 US EXAM OF HEAD AND NECK: CPT | Performed by: FAMILY MEDICINE

## 2023-10-05 NOTE — TELEPHONE ENCOUNTER
From: Yassine Mijares  To: Darwin Powell  Sent: 10/5/2023 4:05 PM CDT  Subject: Question regarding US HEAD/NECK (NPD=56129)    Would I need to schedule a CT scan based on test results? Should I be concerned by anything on these test results?     Thank you,  Omar Aguirre

## 2023-10-09 DIAGNOSIS — R59.9 SWOLLEN LYMPH NODES: Primary | ICD-10-CM

## 2023-10-10 ENCOUNTER — TELEPHONE (OUTPATIENT)
Dept: FAMILY MEDICINE CLINIC | Facility: CLINIC | Age: 43
End: 2023-10-10

## 2023-10-10 DIAGNOSIS — R59.9 SWOLLEN LYMPH NODES: ICD-10-CM

## 2023-10-10 DIAGNOSIS — R59.9 LYMPH NODE ENLARGEMENT: Primary | ICD-10-CM

## 2023-10-10 NOTE — TELEPHONE ENCOUNTER
Per CT protocol, CT soft tissue neck should be with contrast only due to diagnosis. Order pended if ok to change.

## 2023-10-10 NOTE — TELEPHONE ENCOUNTER
Jerrod Be with Radiology is calling to ask that order be changed to with contrast soft tissue of neck.     Please advise Kevin 30. [phone]    Future Appointments   Date Time Provider Emma Plummer   10/13/2023  9:00 AM OS CT RM 1 OS CT [place]   10/13/2023 10:00 AM OS CT RM 1 OS CT POST [facility]

## 2023-10-13 ENCOUNTER — HOSPITAL ENCOUNTER (OUTPATIENT)
Dept: CT IMAGING | Age: 43
Discharge: HOME | End: 2023-10-13
Attending: FAMILY MEDICINE
Payer: COMMERCIAL

## 2023-10-13 DIAGNOSIS — R59.9 SWOLLEN LYMPH NODES: ICD-10-CM

## 2023-10-13 LAB
CREAT BLD-MCNC: 0.8 MG/DL
EGFRCR SERPLBLD CKD-EPI 2021: 94 ML/MIN/1.73M2 (ref 60–?)

## 2023-10-13 PROCEDURE — 70450 CT HEAD/BRAIN W/O DYE: CPT | Performed by: FAMILY MEDICINE

## 2023-10-13 PROCEDURE — 70491 CT SOFT TISSUE NECK W/DYE: CPT | Performed by: FAMILY MEDICINE

## 2023-10-13 PROCEDURE — 82565 ASSAY OF CREATININE: CPT

## 2023-10-15 DIAGNOSIS — M62.830 PARASPINAL MUSCLE SPASM: ICD-10-CM

## 2023-10-15 DIAGNOSIS — M62.838 MUSCLE SPASMS OF NECK: ICD-10-CM

## 2023-10-16 RX ORDER — TIZANIDINE 4 MG/1
4 TABLET ORAL EVERY 6 HOURS PRN
Qty: 56 TABLET | Refills: 0 | Status: SHIPPED | OUTPATIENT
Start: 2023-10-16

## 2023-10-16 NOTE — TELEPHONE ENCOUNTER
Requested Renewals     Name from pharmacy: TIZANIDINE HCL 4 MG TABLET         Will file in chart as: TIZANIDINE 4 MG Oral Tab    Sig: TAKE 1 TABLET BY MOUTH EVERY 6 HOURS AS NEEDED    Disp: 56 tablet    Refills: 0 (Pharmacy requested: Not specified)    Start: 10/15/2023    Class: Normal    For: Muscle spasms of neck, Paraspinal muscle spasm          No future appointments. LOV: 9/26/23  Last physical exam done 11/1/22  Last labs done 10/28/22  Last refill given on 8/23/23 for #56    -medication pended for review.

## 2023-10-17 ENCOUNTER — PATIENT MESSAGE (OUTPATIENT)
Dept: FAMILY MEDICINE CLINIC | Facility: CLINIC | Age: 43
End: 2023-10-17

## 2023-10-17 DIAGNOSIS — Z98.890 NECK PAIN WITH HISTORY OF CERVICAL SPINAL SURGERY: ICD-10-CM

## 2023-10-17 DIAGNOSIS — M54.2 NECK PAIN WITH HISTORY OF CERVICAL SPINAL SURGERY: ICD-10-CM

## 2023-10-17 DIAGNOSIS — J33.9 NASAL POLYPS: Primary | ICD-10-CM

## 2023-10-17 NOTE — TELEPHONE ENCOUNTER
From: Vipul Torrez  To: Vasile Coulter  Sent: 10/17/2023 2:18 PM CDT  Subject: Test results    [address],  Does the results from this CT scan also confirm nothing or just the CT of the brain? Please let me know as I am still having the same pain and a lot of pressure.      Thank you,  Cisco Gonzalez

## 2023-10-20 ENCOUNTER — TELEPHONE (OUTPATIENT)
Dept: SURGERY | Facility: CLINIC | Age: 43
End: 2023-10-20

## 2023-10-20 NOTE — TELEPHONE ENCOUNTER
Lvm to notify  pt of appt cancellation wirito arriola due to provider not being able to see her for Dx::Neck pain with history of cervical spinal surgery ; please assist pt in r/s appt with MARGARITA Temple due to NO MRI imaging once pt CB.

## 2023-10-23 ENCOUNTER — TELEMEDICINE (OUTPATIENT)
Dept: FAMILY MEDICINE CLINIC | Facility: CLINIC | Age: 43
End: 2023-10-23
Payer: COMMERCIAL

## 2023-10-23 DIAGNOSIS — H10.9 CONJUNCTIVITIS OF LEFT EYE, UNSPECIFIED CONJUNCTIVITIS TYPE: Primary | ICD-10-CM

## 2023-10-23 PROCEDURE — 99213 OFFICE O/P EST LOW 20 MIN: CPT | Performed by: FAMILY MEDICINE

## 2023-10-23 RX ORDER — MELOXICAM 7.5 MG/1
7.5 TABLET ORAL DAILY
Qty: 90 TABLET | Refills: 0 | Status: SHIPPED | OUTPATIENT
Start: 2023-10-23

## 2023-10-23 RX ORDER — OFLOXACIN 3 MG/ML
1 SOLUTION/ DROPS OPHTHALMIC 4 TIMES DAILY
Qty: 10 ML | Refills: 0 | Status: SHIPPED | OUTPATIENT
Start: 2023-10-23

## 2023-10-23 NOTE — PATIENT INSTRUCTIONS
Thank you for choosing Lawrence Brownlee MD at [place] [address]  To Do: Zenda Merlin Masonny  1. Please take meds as directed. [facility] is located in [address]. Monday, Tuesday & Friday - 8 a.m. to 4 p.m. Wednesday, Thursday - 7 a.m. to 3 p.m. The lab is closed daily from 12 p.m.-12:30 p.m. Saturday lab hours by appointment. Call [phone] to schedule the appointment. Please signup for [facility], which is electronic access to your record if you have not done so. All your results will post on there. [email or web address]   You can NOW use [facility] to book your appointments with us, or consider using open access scheduling which is through the edward website [email or web address] and type in Lawrence Brownlee MD and follow the links for \"Schedule Online Now\"    To schedule Imaging or tests at [facility] Scheduling [phone], Go to [facility] A ER Building (For example: CT scans, X rays, Ultrasound, MRI)  Cardiac Testing in ER building Building A second floor Cardiac Testing [phone] (For example: Holter Monitor, Cardiac Stress tests,Event Monitor, or 2D Echocardiograms)  [facility] call [phone] usually in [facility]  Walk in Clinic in [place] at [facility] Mon-Fri at 8am-7:30 p.m., and Sat/Sun 9:00a. m.-4:30 p.m. Also at [address]  Call [phone] for info     Please call our office about any questions regarding your treatment/medicines/tests as a result of today's visit. For your safety, read the entire package insert of all medicines prescribed to you and be aware of all of the risks of treatment even beyond those discussed today. All therapies have potential risk of harm or side effects or medication interactions.   It is your duty and for your safety to discuss with the pharmacist and our office with questions, and to notify us and stop treatment if problems arise, but know that our intention is that the benefits outweigh those potential risks and we strive to make you healthier and to improve your quality of life. Referrals, and Radiology Information:    If your insurance requires a referral to a specialist, please allow 5 business days to process your referral request.    If Melania Anderson MD orders a CT or MRI, it may take up to 10 business days to receive approval from your insurance company. Once our office has called informing you that the insurance company approved your testing, please call Central Scheduling at [phone]  Please allow our office 5 business days to contact you regarding any testing results. Refill policies:   Allow 3 business days for refills; controlled substances may take longer and must be picked up from the office in person. Narcotic medications can only be filled in 30 day increments and must be refilled at an office visit only. If your prescription is due for a refill, you may be due for a follow-up appointment. We cannot refill your maintenance medications at a preventative wellness visit. To best provide you care, patients receiving maintenance medications need to be seen at least twice a year.

## 2023-10-23 NOTE — TELEPHONE ENCOUNTER
Requesting Meloxicam 7.5mg  LOV: 10/23/23 VV  RTC: prn  Last Relevant Labs: 10/28/22  Filled: 8/4/23 #30 with 3 refills    Future Appointments   Date Time Provider Emma Plummer   11/7/2023  8:20 AM Gayatri Smith MD [facility]

## 2023-10-23 NOTE — PROGRESS NOTES
Subjective:   Patient ID: Judie Norman is a 37year old female. HPI  Ms. Sonia Gallagher is a pleasant 42-year-old female presents for video visit today for redness of the left eye which started yesterday associated with itching, crusty discharge and redness. She has had chronic congestion and will be seeing ENT for this. She had CT scan of the sinuses done previously which showed polyps on the left maxillary sinus. I had reviewed past medical and family histories together with allergy and medication lists documented. History/Other:   Review of Systems   Constitutional:  Negative for fatigue and fever. Respiratory:  Negative for cough and shortness of breath. Neurological:  Positive for headaches. Current Outpatient Medications   Medication Sig Dispense Refill    ofloxacin 0.3 % Ophthalmic Solution Place 1 drop into the left eye 4 (four) times daily. 10 mL 0    tiZANidine 4 MG Oral Tab Take 1 tablet (4 mg total) by mouth every 6 (six) hours as needed. 56 tablet 0    venlafaxine  MG Oral Capsule SR 24 Hr Take 1 capsule (150 mg total) by mouth daily. 90 capsule 0    venlafaxine ER 75 MG Oral Capsule SR 24 Hr Take 1 capsule (75 mg total) by mouth daily. 90 capsule 0    Meloxicam 15 MG Oral Tab Take 1 tablet (15 mg total) by mouth daily. 30 tablet 3    clonazePAM 1 MG Oral Tab Take 1 tablet (1 mg total) by mouth nightly as needed for Anxiety. 90 tablet 0    montelukast 10 MG Oral Tab Take 1 tablet (10 mg total) by mouth daily. 90 tablet 0    Omeprazole 40 MG Oral Capsule Delayed Release Take 1 capsule (40 mg total) by mouth daily. 90 capsule 0    ALPRAZolam 0.25 MG Oral Tab Take 1 tablet (0.25 mg total) by mouth daily as needed. 30 tablet 1    Multiple Vitamins-Minerals (MULTI-VITAMIN/MINERALS) Oral Tab Take 1 tablet by mouth daily. Allergies:No Known Allergies    Objective:   Physical Exam  Constitutional:       General: She is not in acute distress.   Neurological:      Mental Status: She is alert.         Assessment & Plan:   Conjunctivitis of left eye, unspecified conjunctivitis type  (primary encounter diagnosis)  -Avoid scratching  - We will treat with ofloxacin drops to the left eye  - This may be viral versus bacterial versus allergic  - Agree with seeing ENT      This note was prepared using [employer] voice recognition dictation software. As a result errors may occur. When identified these errors have been corrected. While every attempt is made to correct errors during dictation discrepancies may still exist          No orders of the defined types were placed in this encounter. Meds This Visit:  Requested Prescriptions     Signed Prescriptions Disp Refills    ofloxacin 0.3 % Ophthalmic Solution 10 mL 0     Sig: Place 1 drop into the left eye 4 (four) times daily.        Imaging & Referrals:  None

## 2023-10-31 DIAGNOSIS — F41.9 ANXIETY: ICD-10-CM

## 2023-10-31 DIAGNOSIS — F51.01 PRIMARY INSOMNIA: ICD-10-CM

## 2023-10-31 RX ORDER — CLONAZEPAM 1 MG/1
1 TABLET ORAL NIGHTLY PRN
Qty: 90 TABLET | Refills: 0 | Status: SHIPPED | OUTPATIENT
Start: 2023-10-31

## 2023-11-07 ENCOUNTER — OFFICE VISIT (OUTPATIENT)
Facility: LOCATION | Age: 43
End: 2023-11-07
Payer: COMMERCIAL

## 2023-11-07 DIAGNOSIS — G44.229 CHRONIC TENSION-TYPE HEADACHE, NOT INTRACTABLE: ICD-10-CM

## 2023-11-07 DIAGNOSIS — J32.0 CHRONIC MAXILLARY SINUSITIS: Primary | ICD-10-CM

## 2023-11-07 PROCEDURE — 99204 OFFICE O/P NEW MOD 45 MIN: CPT | Performed by: OTOLARYNGOLOGY

## 2023-11-07 NOTE — PROGRESS NOTES
Lashawn Sharpe is a 37year old female. Patient presents with:  Sinus Problem    HPI:   She has a history of headaches. She has had worsening headaches over the last 3 months. She complains of occipital headaches but she also gets headaches behind her eyes. She has a history of neck surgery. She gets chronic nasal congestion. She gets a few sinus infections each year. She does take allergy medication. At times she uses Flonase. Current Outpatient Medications   Medication Sig Dispense Refill    clonazePAM 1 MG Oral Tab Take 1 tablet (1 mg total) by mouth nightly as needed for Anxiety. 90 tablet 0    Meloxicam 7.5 MG Oral Tab Take 1 tablet (7.5 mg total) by mouth daily. 90 tablet 0    ofloxacin 0.3 % Ophthalmic Solution Place 1 drop into the left eye 4 (four) times daily. 10 mL 0    tiZANidine 4 MG Oral Tab Take 1 tablet (4 mg total) by mouth every 6 (six) hours as needed. 56 tablet 0    venlafaxine  MG Oral Capsule SR 24 Hr Take 1 capsule (150 mg total) by mouth daily. 90 capsule 0    venlafaxine ER 75 MG Oral Capsule SR 24 Hr Take 1 capsule (75 mg total) by mouth daily. 90 capsule 0    Meloxicam 15 MG Oral Tab Take 1 tablet (15 mg total) by mouth daily. 30 tablet 3    montelukast 10 MG Oral Tab Take 1 tablet (10 mg total) by mouth daily. 90 tablet 0    Omeprazole 40 MG Oral Capsule Delayed Release Take 1 capsule (40 mg total) by mouth daily. 90 capsule 0    ALPRAZolam 0.25 MG Oral Tab Take 1 tablet (0.25 mg total) by mouth daily as needed. 30 tablet 1    Multiple Vitamins-Minerals (MULTI-VITAMIN/MINERALS) Oral Tab Take 1 tablet by mouth daily.         Past Medical History:   Diagnosis Date    Anesthesia complication     Anxiety and depression     Anxiety state     B12 deficiency 7/7/2017    Back problem     Calculus of kidney     COVID-19 10/28/2020    Kidney stone     Neuropathy 7/7/2017    Obesity (BMI 30-39.9) 11/12/2015    Osteoarthritis     Paresthesias 7/7/2017    PONV (postoperative nausea and vomiting)     Rosacea 12/3/2013    Visual impairment     GLASSES    Vitamin D deficiency 2017      Social History:  Social History     Socioeconomic History    Marital status:    Tobacco Use    Smoking status: Never    Smokeless tobacco: Never   Vaping Use    Vaping Use: Never used   Substance and Sexual Activity    Alcohol use: No    Drug use: No   Other Topics Concern    Caffeine Concern Yes     Comment: 2 cups daily    Stress Concern No    Weight Concern No    Special Diet No    Exercise No     Comment: 2-3 times a week     Seat Belt Yes      Past Surgical History:   Procedure Laterality Date      , , 2009    OTHER Right 2017    thumb surgery     OTHER SURGICAL HISTORY  2016    Cystoscopy and Stent Removal    TONSILLECTOMY      TUBAL LIGATION           REVIEW OF SYSTEMS:   GENERAL HEALTH: feels well otherwise  GENERAL : denies fever, chills, sweats, weight loss, weight gain  SKIN: denies any unusual skin lesions or rashes  RESPIRATORY: denies shortness of breath with exertion  NEURO: denies headaches    EXAM:   LMP 2023 (Approximate)     System Findings Details   Constitutional  Overall appearance - Normal.   Psychiatric  Orientation - Oriented to time, place, person & situation. Appropriate mood and affect. Head/Face  Facial features -- Normal. Skull - Normal.   Eyes  Pupils equal ,round ,react to light and accomidate   Ears, Nose, Throat, Neck  Ears clear nose some congestion with turban hypertrophy oropharynx clear neck no masses   Neurological  Memory - Normal. Cranial nerves - Cranial nerves II through XII grossly intact. Lymph Detail  Submental. Submandibular. Anterior cervical. Posterior cervical. Supraclavicular. ASSESSMENT AND PLAN:   1. Chronic maxillary sinusitis  CT of the brain and CT of the neck were reviewed. This does show a dependent polyp in the left maxillary sinus which is nonoccluding. Otherwise the sinuses look good.   It also shows some nonspecific lymphadenopathy. It is reasonable to say that the sinuses are playing some role in the headaches but I do not believe they are the sole cause of the headaches. She will utilize nasal saline Flonase antihistamine and Sudafed for the next 2 to 3 weeks. She will do this daily and see how it impacts her headaches. 2. Chronic tension-type headache, not intractable  She is going to meet with a neurosurgeon soon regarding her neck surgery. The patient indicates understanding of these issues and agrees to the plan. No follow-ups on file.     Damian Marie MD  11/7/2023  8:52 AM

## 2023-11-19 RX ORDER — VENLAFAXINE HYDROCHLORIDE 75 MG/1
75 CAPSULE, EXTENDED RELEASE ORAL DAILY
Qty: 90 CAPSULE | Refills: 0 | Status: SHIPPED | OUTPATIENT
Start: 2023-11-19

## 2023-11-19 RX ORDER — VENLAFAXINE HYDROCHLORIDE 150 MG/1
150 CAPSULE, EXTENDED RELEASE ORAL DAILY
Qty: 90 CAPSULE | Refills: 0 | Status: SHIPPED | OUTPATIENT
Start: 2023-11-19

## 2023-11-26 RX ORDER — MONTELUKAST SODIUM 10 MG/1
10 TABLET ORAL DAILY
Qty: 90 TABLET | Refills: 0 | Status: SHIPPED | OUTPATIENT
Start: 2023-11-26

## 2023-12-07 ENCOUNTER — PATIENT MESSAGE (OUTPATIENT)
Dept: FAMILY MEDICINE CLINIC | Facility: CLINIC | Age: 43
End: 2023-12-07

## 2023-12-07 NOTE — TELEPHONE ENCOUNTER
From: Lashawn Sharpe  To: Alicja Cotter  Sent: 2023 10:51 AM CST  Subject: Medication    Hello Dr. Florida Cordova,  We had a prescription of Xanax from previous anxiety highs. I no longer have it on my chart and I didn't know why. I do not have any at all, because what I had . However, my  just recently had a fall at work causing a lot of stress, and now I realize that my anxiety has been higher than what my regular medication can normally handle. Is there a way to get the Xanax again up in as a script. If I need to do an video visit, that would be fine, its just been tough to continue leaving work and such because I am taking care of my  as well.      Thank you for your help,  Marisa Weems

## 2023-12-17 DIAGNOSIS — M62.838 MUSCLE SPASMS OF NECK: ICD-10-CM

## 2023-12-17 DIAGNOSIS — M62.830 PARASPINAL MUSCLE SPASM: ICD-10-CM

## 2023-12-18 RX ORDER — TIZANIDINE 4 MG/1
4 TABLET ORAL EVERY 6 HOURS PRN
Qty: 56 TABLET | Refills: 0 | Status: SHIPPED | OUTPATIENT
Start: 2023-12-18

## 2023-12-18 NOTE — TELEPHONE ENCOUNTER
Requested Renewals     Name from pharmacy: TIZANIDINE HCL 4 MG TABLET         Will file in chart as: TIZANIDINE 4 MG Oral Tab    Sig: TAKE 1 TABLET BY MOUTH EVERY 6 HOURS AS NEEDED    Disp: 56 tablet    Refills: 0 (Pharmacy requested: Not specified)    Start: 12/17/2023    Class: Normal    Non-formulary For: Muscle spasms of neck, Paraspinal muscle spasm          No future appointments. LOV: 12/8/23 telemedicine  Last physical exam done 11/1/22  Last refill given on 10/16/23 for #56    -medication pended for review.

## 2024-01-23 RX ORDER — MELOXICAM 15 MG/1
15 TABLET ORAL DAILY
Qty: 30 TABLET | Refills: 3 | Status: SHIPPED | OUTPATIENT
Start: 2024-01-23 | End: 2024-05-22

## 2024-01-23 NOTE — TELEPHONE ENCOUNTER
Requested Renewals     Name from pharmacy: MELOXICAM 15 MG TABLET         Will file in chart as: MELOXICAM 15 MG Oral Tab    Sig: Take 1 tablet (15 mg total) by mouth daily.    Disp: 30 tablet    Refills: 3    Start: 1/23/2024    Class: Normal    Non-formulary    Last ordered: 5 months ago (8/4/2023) by BRITNI Gabriel    Last refill: 12/25/2023    Rx #: [id number]          No future appointments.  LOV: 12/8/23 telemedicine  Last physical done 11/1/22  Last refill given on 10/23/23 for #90    MCM sent to patient to schedule annual exam.    -medication pended for review.

## 2024-01-28 DIAGNOSIS — M62.838 MUSCLE SPASMS OF NECK: ICD-10-CM

## 2024-01-28 DIAGNOSIS — M62.830 PARASPINAL MUSCLE SPASM: ICD-10-CM

## 2024-01-28 DIAGNOSIS — F51.01 PRIMARY INSOMNIA: ICD-10-CM

## 2024-01-28 DIAGNOSIS — F41.9 ANXIETY: ICD-10-CM

## 2024-01-28 RX ORDER — MELOXICAM 7.5 MG/1
7.5 TABLET ORAL DAILY
Qty: 90 TABLET | Refills: 0 | Status: SHIPPED | OUTPATIENT
Start: 2024-01-28

## 2024-01-29 RX ORDER — CLONAZEPAM 1 MG/1
1 TABLET ORAL NIGHTLY PRN
Qty: 90 TABLET | Refills: 0 | Status: SHIPPED | OUTPATIENT
Start: 2024-01-29

## 2024-01-29 RX ORDER — TIZANIDINE 4 MG/1
4 TABLET ORAL EVERY 6 HOURS PRN
Qty: 56 TABLET | Refills: 0 | Status: SHIPPED | OUTPATIENT
Start: 2024-01-29

## 2024-01-29 NOTE — TELEPHONE ENCOUNTER
Requested Renewals     Name from pharmacy: TIZANIDINE HCL 4 MG TABLET         Will file in chart as: TIZANIDINE 4 MG Oral Tab    Sig: TAKE 1 TABLET BY MOUTH EVERY 6 HOURS AS NEEDED    Disp: 56 tablet    Refills: 0 (Pharmacy requested: Not specified)    Start: 1/28/2024    Class: Normal    For: Muscle spasms of neck, Paraspinal muscle spasm    Last ordered: 1 month ago (12/18/2023) by BRITNI Gabriel    Last refill: 12/18/2023    Rx #: [id number]          No future appointments.  LOV: 12/8/23  Last refill given on 12/18/23 for #56    -medication pended for review.

## 2024-02-08 RX ORDER — MONTELUKAST SODIUM 10 MG/1
10 TABLET ORAL DAILY
Qty: 90 TABLET | Refills: 0 | Status: SHIPPED | OUTPATIENT
Start: 2024-02-08

## 2024-02-08 RX ORDER — VENLAFAXINE HYDROCHLORIDE 150 MG/1
150 CAPSULE, EXTENDED RELEASE ORAL DAILY
Qty: 90 CAPSULE | Refills: 0 | Status: SHIPPED | OUTPATIENT
Start: 2024-02-08

## 2024-02-08 RX ORDER — VENLAFAXINE HYDROCHLORIDE 75 MG/1
75 CAPSULE, EXTENDED RELEASE ORAL DAILY
Qty: 90 CAPSULE | Refills: 0 | Status: SHIPPED | OUTPATIENT
Start: 2024-02-08

## 2024-03-19 NOTE — TELEPHONE ENCOUNTER
Informed patient of MD recommendation below via [employer] message. Attempted to reach patient, no answer, left message to contact the office.
It looks like pt had refills on both medications in October with 5 refills, should not need a refill.   Please contact pt and see why she is requesting early
Requested Prescriptions     Pending Prescriptions Disp Refills   • temazepam 30 MG Oral Cap 30 capsule 5     Sig: Take 1 capsule (30 mg total) by mouth nightly as needed for Sleep.    • naproxen 500 MG Oral Tab 30 tablet 0     Sig: Take 1 tablet (500 mg tot
No-Patient/Caregiver offered and refused free interpretation services.

## 2024-03-28 ENCOUNTER — ORDER TRANSCRIPTION (OUTPATIENT)
Dept: ADMINISTRATIVE | Facility: HOSPITAL | Age: 44
End: 2024-03-28

## 2024-03-28 DIAGNOSIS — Z12.31 VISIT FOR SCREENING MAMMOGRAM: Primary | ICD-10-CM

## 2024-04-05 DIAGNOSIS — M62.838 MUSCLE SPASMS OF NECK: ICD-10-CM

## 2024-04-05 DIAGNOSIS — M62.830 PARASPINAL MUSCLE SPASM: ICD-10-CM

## 2024-04-07 RX ORDER — TIZANIDINE 4 MG/1
4 TABLET ORAL EVERY 6 HOURS PRN
Qty: 56 TABLET | Refills: 0 | Status: SHIPPED | OUTPATIENT
Start: 2024-04-07

## 2024-04-10 ENCOUNTER — HOSPITAL ENCOUNTER (OUTPATIENT)
Dept: MAMMOGRAPHY | Age: 44
Discharge: HOME | End: 2024-04-10
Attending: FAMILY MEDICINE
Payer: COMMERCIAL

## 2024-04-10 DIAGNOSIS — Z12.31 VISIT FOR SCREENING MAMMOGRAM: ICD-10-CM

## 2024-04-10 PROCEDURE — 77067 SCR MAMMO BI INCL CAD: CPT | Performed by: FAMILY MEDICINE

## 2024-04-10 PROCEDURE — 77063 BREAST TOMOSYNTHESIS BI: CPT | Performed by: FAMILY MEDICINE

## 2024-04-24 RX ORDER — MELOXICAM 7.5 MG/1
7.5 TABLET ORAL DAILY
Qty: 90 TABLET | Refills: 0 | Status: SHIPPED | OUTPATIENT
Start: 2024-04-24

## 2024-04-24 NOTE — TELEPHONE ENCOUNTER
Requesting Meloxicam 7.5mg  LOV: 12/8/23 VV  RTC: prn  Last Relevant Labs: 10/28/22  Filled: 1/28/24 #90 with 0 refills    Future Appointments   Date Time Provider Department Center   4/29/2024 12:40 PM PF [facility]     Non-Narcotic Pain Medication Protocol Padeoc4204/24/2024 12:25 AM   Protocol Details In person appointment or virtual visit in the past 6 mos or appointment in next 3 mos     Rx sent per protocol

## 2024-04-29 ENCOUNTER — HOSPITAL ENCOUNTER (OUTPATIENT)
Dept: MAMMOGRAPHY | Age: 44
Discharge: HOME | End: 2024-04-29
Attending: FAMILY MEDICINE
Payer: COMMERCIAL

## 2024-04-29 DIAGNOSIS — R92.2 INCONCLUSIVE MAMMOGRAM: ICD-10-CM

## 2024-04-29 DIAGNOSIS — F41.9 ANXIETY: ICD-10-CM

## 2024-04-29 DIAGNOSIS — F51.01 PRIMARY INSOMNIA: ICD-10-CM

## 2024-04-29 PROCEDURE — 77061 BREAST TOMOSYNTHESIS UNI: CPT | Performed by: FAMILY MEDICINE

## 2024-04-29 PROCEDURE — 77065 DX MAMMO INCL CAD UNI: CPT | Performed by: FAMILY MEDICINE

## 2024-04-30 ENCOUNTER — OFFICE VISIT (OUTPATIENT)
Dept: FAMILY MEDICINE CLINIC | Facility: CLINIC | Age: 44
End: 2024-04-30
Payer: COMMERCIAL

## 2024-04-30 VITALS
DIASTOLIC BLOOD PRESSURE: 86 MMHG | HEART RATE: 95 BPM | HEIGHT: 65 IN | RESPIRATION RATE: 20 BRPM | WEIGHT: 238 LBS | OXYGEN SATURATION: 97 % | TEMPERATURE: 99 F | BODY MASS INDEX: 39.65 KG/M2 | SYSTOLIC BLOOD PRESSURE: 134 MMHG

## 2024-04-30 DIAGNOSIS — J06.9 UPPER RESPIRATORY TRACT INFECTION, UNSPECIFIED TYPE: Primary | ICD-10-CM

## 2024-04-30 DIAGNOSIS — J02.9 SORE THROAT: ICD-10-CM

## 2024-04-30 LAB
CONTROL LINE PRESENT WITH A CLEAR BACKGROUND (YES/NO): YES YES/NO
KIT LOT #: NORMAL NUMERIC
OPERATOR ID: NORMAL
POCT LOT NUMBER: NORMAL
RAPID SARS-COV-2 BY PCR: NOT DETECTED
STREP GRP A CUL-SCR: NEGATIVE

## 2024-04-30 PROCEDURE — U0002 COVID-19 LAB TEST NON-CDC: HCPCS | Performed by: PHYSICIAN ASSISTANT

## 2024-04-30 PROCEDURE — 87880 STREP A ASSAY W/OPTIC: CPT | Performed by: PHYSICIAN ASSISTANT

## 2024-04-30 PROCEDURE — 99213 OFFICE O/P EST LOW 20 MIN: CPT | Performed by: PHYSICIAN ASSISTANT

## 2024-04-30 RX ORDER — CLONAZEPAM 1 MG/1
1 TABLET ORAL NIGHTLY PRN
Qty: 90 TABLET | Refills: 0 | Status: SHIPPED | OUTPATIENT
Start: 2024-04-30

## 2024-04-30 NOTE — PROGRESS NOTES
CHIEF COMPLAINT:     Chief Complaint   Patient presents with    Sore Throat     I have a low grade fever,  sore throat,body aches - Entered by patient  S/s for 2 days.  Otc meds taken       HPI:   Parisa Cooney is a 43 year old female who presents with complaints of feeling sick for the past 2 days.  Symptoms include sore throat, loss of voice, post nasal drip, cough, low grade temperature, chills, and body aches.  The patient denies ear pain, CP, SOB, wheezing, abdominal pain, vomiting, diarrhea, and rash.  The patient is tolerating po.   Patient denies recent COVID infection.  The patient's daughter is sick with similar symptoms.     Current Outpatient Medications   Medication Sig Dispense Refill    clonazePAM 1 MG Oral Tab Take 1 tablet (1 mg total) by mouth nightly as needed for Anxiety. 90 tablet 0    MELOXICAM 7.5 MG Oral Tab TAKE 1 TABLET BY MOUTH EVERY DAY 90 tablet 0    tiZANidine 4 MG Oral Tab Take 1 tablet (4 mg total) by mouth every 6 (six) hours as needed. 56 tablet 0    venlafaxine ER 75 MG Oral Capsule SR 24 Hr Take 1 capsule (75 mg total) by mouth daily. 90 capsule 0    venlafaxine  MG Oral Capsule SR 24 Hr Take 1 capsule (150 mg total) by mouth daily. 90 capsule 0    montelukast 10 MG Oral Tab Take 1 tablet (10 mg total) by mouth daily. 90 tablet 0    Meloxicam 15 MG Oral Tab Take 1 tablet (15 mg total) by mouth daily. 30 tablet 3    ofloxacin 0.3 % Ophthalmic Solution Place 1 drop into the left eye 4 (four) times daily. 10 mL 0    Omeprazole 40 MG Oral Capsule Delayed Release Take 1 capsule (40 mg total) by mouth daily. 90 capsule 0    ALPRAZolam 0.25 MG Oral Tab Take 1 tablet (0.25 mg total) by mouth daily as needed. 30 tablet 1    Multiple Vitamins-Minerals (MULTI-VITAMIN/MINERALS) Oral Tab Take 1 tablet by mouth daily.        Past Medical History:    Anesthesia complication    Anxiety and depression    Anxiety state    B12 deficiency    Back problem    Calculus of kidney    COVID-19     Kidney stone    Neuropathy    Obesity (BMI 30-39.9)    Osteoarthritis    Paresthesias    PONV (postoperative nausea and vomiting)    Rosacea    Visual impairment    GLASSES    Vitamin D deficiency      Social History:  Social History     Socioeconomic History    Marital status:    Tobacco Use    Smoking status: Never    Smokeless tobacco: Never   Vaping Use    Vaping status: Never Used   Substance and Sexual Activity    Alcohol use: No    Drug use: No   Other Topics Concern    Caffeine Concern Yes     Comment: 2 cups daily    Stress Concern No    Weight Concern No    Special Diet No    Exercise No     Comment: 2-3 times a week     Seat Belt Yes        REVIEW OF SYSTEMS:   GENERAL: See HPI  SKIN: Denies rashes, skin wounds or ulcers.  EYES: Denies blurred vision or double vision  HENT: See HPI  CHEST: Denies chest pain, or palpitations  LUNGS: See HPI  GI: Denies abdominal pain, N/V/C/D.   MUSCULOSKELETAL: no arthralgia or swollen joints  LYMPH:  Denies lymphadenopathy  NEURO: Denies headaches or lightheadedness      EXAM:   /86   Pulse 95   Temp 99.1 °F (37.3 °C) (Oral)   Resp 20   Ht 5' 5\" (1.651 m)   Wt 238 lb (108 kg)   LMP 03/31/2024 (Approximate)   SpO2 97%   BMI 39.61 kg/m²   GENERAL: well developed, well nourished,in no apparent distress, cooperative   SKIN: no rashes, nosuspicious lesions, no abnormal pigmentation  HEAD: atraumatic, normocephalic  EYES: EOM intact, PERRL.  Conjunctiva normal.  Cornea clear.  Lid margins normal.  No active drainage.  EARS: Right TM normal, no bulging, no retraction, + fluid, bony landmarks normal.  Left TM normal, no bulging, no retraction, no fluid, bony landmarks normal.    NOSE: nostrils patent, no discharge, nasal mucosa pink and not inflamed.  No sinus tenderness.  THROAT: oral mucosa pink, moist and intact. No visible dental caries. Posterior pharynx without erythema or exudates.  NECK: supple, non-tender.  LUNGS: clear to auscultation bilaterally,  no wheezes or rhonchi. Breathing is non labored.  CARDIO: RRR without murmur  GI: No visible scars, or masses. BS's present x4. No palpable masses or hepatosplenomegaly.  Non tender.  No guarding or rebound tenderness  EXTREMITIES: no cyanosis, clubbing or edema.  Homans NEG.  Dorsalis Pedis 2+.  LYMPH:  No lymphadenopathy.    NEURO: A&Ox3.  CN II-XII intact.  No focal deficits.  Coordination and Gait normal.  Kernig and Brudzinski's are negative.    Rapid Strep is NEGATIVE   Rapid COVID is NEGATIVE       ASSESSMENT AND PLAN:     ASSESSMENT:  Encounter Diagnoses   Name Primary?    Sore throat     Upper respiratory tract infection, unspecified type Yes       PLAN:    Patient Instructions   Flonase  Antihistamine  Follow up with PCP   If worse seek treatment

## 2024-06-05 RX ORDER — VENLAFAXINE HYDROCHLORIDE 75 MG/1
75 CAPSULE, EXTENDED RELEASE ORAL DAILY
Qty: 90 CAPSULE | Refills: 0 | Status: SHIPPED | OUTPATIENT
Start: 2024-06-05

## 2024-06-05 RX ORDER — MONTELUKAST SODIUM 10 MG/1
10 TABLET ORAL DAILY
Qty: 90 TABLET | Refills: 0 | Status: SHIPPED | OUTPATIENT
Start: 2024-06-05

## 2024-06-05 RX ORDER — VENLAFAXINE HYDROCHLORIDE 150 MG/1
150 CAPSULE, EXTENDED RELEASE ORAL DAILY
Qty: 90 CAPSULE | Refills: 0 | Status: SHIPPED | OUTPATIENT
Start: 2024-06-05

## 2024-06-07 DIAGNOSIS — M62.830 PARASPINAL MUSCLE SPASM: ICD-10-CM

## 2024-06-07 DIAGNOSIS — M62.838 MUSCLE SPASMS OF NECK: ICD-10-CM

## 2024-06-10 NOTE — TELEPHONE ENCOUNTER
Future Appointments   Date Time Provider Department Center   6/11/2024 10:00 AM Masood Reece APRN [facility] [address] [facility] [address]

## 2024-06-11 ENCOUNTER — LAB ENCOUNTER (OUTPATIENT)
Dept: LAB | Age: 44
End: 2024-06-11
Attending: FAMILY MEDICINE
Payer: COMMERCIAL

## 2024-06-11 ENCOUNTER — OFFICE VISIT (OUTPATIENT)
Dept: FAMILY MEDICINE CLINIC | Facility: CLINIC | Age: 44
End: 2024-06-11
Payer: COMMERCIAL

## 2024-06-11 ENCOUNTER — TELEPHONE (OUTPATIENT)
Dept: FAMILY MEDICINE CLINIC | Facility: CLINIC | Age: 44
End: 2024-06-11

## 2024-06-11 VITALS
SYSTOLIC BLOOD PRESSURE: 118 MMHG | DIASTOLIC BLOOD PRESSURE: 76 MMHG | OXYGEN SATURATION: 99 % | HEIGHT: 65 IN | BODY MASS INDEX: 39.85 KG/M2 | HEART RATE: 79 BPM | TEMPERATURE: 98 F | RESPIRATION RATE: 18 BRPM | WEIGHT: 239.19 LBS

## 2024-06-11 DIAGNOSIS — Z00.00 LABORATORY EXAMINATION ORDERED AS PART OF A ROUTINE GENERAL MEDICAL EXAMINATION: ICD-10-CM

## 2024-06-11 DIAGNOSIS — Z00.00 WELLNESS EXAMINATION: Primary | ICD-10-CM

## 2024-06-11 DIAGNOSIS — E55.9 VITAMIN D DEFICIENCY: ICD-10-CM

## 2024-06-11 DIAGNOSIS — Z11.1 ENCOUNTER FOR PPD TEST: ICD-10-CM

## 2024-06-11 LAB
ALBUMIN SERPL-MCNC: 3.3 G/DL (ref 3.4–5)
ALBUMIN/GLOB SERPL: 1.1 {RATIO} (ref 1–2)
ALP LIVER SERPL-CCNC: 80 U/L
ALT SERPL-CCNC: 31 U/L
ANION GAP SERPL CALC-SCNC: 6 MMOL/L (ref 0–18)
AST SERPL-CCNC: 28 U/L (ref 15–37)
BASOPHILS # BLD AUTO: 0.04 X10(3) UL (ref 0–0.2)
BASOPHILS NFR BLD AUTO: 0.6 %
BILIRUB SERPL-MCNC: 0.3 MG/DL (ref 0.1–2)
BUN BLD-MCNC: 13 MG/DL (ref 9–23)
CALCIUM BLD-MCNC: 8.7 MG/DL (ref 8.5–10.1)
CHLORIDE SERPL-SCNC: 110 MMOL/L (ref 98–112)
CHOLEST SERPL-MCNC: 156 MG/DL (ref ?–200)
CO2 SERPL-SCNC: 24 MMOL/L (ref 21–32)
CREAT BLD-MCNC: 0.84 MG/DL
EGFRCR SERPLBLD CKD-EPI 2021: 88 ML/MIN/1.73M2 (ref 60–?)
EOSINOPHIL # BLD AUTO: 0.08 X10(3) UL (ref 0–0.7)
EOSINOPHIL NFR BLD AUTO: 1.1 %
ERYTHROCYTE [DISTWIDTH] IN BLOOD BY AUTOMATED COUNT: 13.2 %
FASTING PATIENT LIPID ANSWER: YES
FASTING STATUS PATIENT QL REPORTED: YES
GLOBULIN PLAS-MCNC: 3 G/DL (ref 2.8–4.4)
GLUCOSE BLD-MCNC: 97 MG/DL (ref 70–99)
HCT VFR BLD AUTO: 39.8 %
HDLC SERPL-MCNC: 33 MG/DL (ref 40–59)
HGB BLD-MCNC: 12.8 G/DL
IMM GRANULOCYTES # BLD AUTO: 0.04 X10(3) UL (ref 0–1)
IMM GRANULOCYTES NFR BLD: 0.6 %
LDLC SERPL CALC-MCNC: 91 MG/DL (ref ?–100)
LYMPHOCYTES # BLD AUTO: 2.44 X10(3) UL (ref 1–4)
LYMPHOCYTES NFR BLD AUTO: 33.6 %
MCH RBC QN AUTO: 28.7 PG (ref 26–34)
MCHC RBC AUTO-ENTMCNC: 32.2 G/DL (ref 31–37)
MCV RBC AUTO: 89.2 FL
MONOCYTES # BLD AUTO: 0.49 X10(3) UL (ref 0.1–1)
MONOCYTES NFR BLD AUTO: 6.7 %
NEUTROPHILS # BLD AUTO: 4.18 X10 (3) UL (ref 1.5–7.7)
NEUTROPHILS # BLD AUTO: 4.18 X10(3) UL (ref 1.5–7.7)
NEUTROPHILS NFR BLD AUTO: 57.4 %
NONHDLC SERPL-MCNC: 123 MG/DL (ref ?–130)
OSMOLALITY SERPL CALC.SUM OF ELEC: 290 MOSM/KG (ref 275–295)
PLATELET # BLD AUTO: 313 10(3)UL (ref 150–450)
POTASSIUM SERPL-SCNC: 4.1 MMOL/L (ref 3.5–5.1)
PROT SERPL-MCNC: 6.3 G/DL (ref 6.4–8.2)
RBC # BLD AUTO: 4.46 X10(6)UL
SODIUM SERPL-SCNC: 140 MMOL/L (ref 136–145)
TRIGL SERPL-MCNC: 182 MG/DL (ref 30–149)
TSI SER-ACNC: 3.24 MIU/ML (ref 0.36–3.74)
VIT D+METAB SERPL-MCNC: 26.3 NG/ML (ref 30–100)
VLDLC SERPL CALC-MCNC: 30 MG/DL (ref 0–30)
WBC # BLD AUTO: 7.3 X10(3) UL (ref 4–11)

## 2024-06-11 PROCEDURE — 86480 TB TEST CELL IMMUN MEASURE: CPT

## 2024-06-11 PROCEDURE — 84443 ASSAY THYROID STIM HORMONE: CPT

## 2024-06-11 PROCEDURE — 3074F SYST BP LT 130 MM HG: CPT | Performed by: FAMILY MEDICINE

## 2024-06-11 PROCEDURE — 3008F BODY MASS INDEX DOCD: CPT | Performed by: FAMILY MEDICINE

## 2024-06-11 PROCEDURE — 80053 COMPREHEN METABOLIC PANEL: CPT

## 2024-06-11 PROCEDURE — 36415 COLL VENOUS BLD VENIPUNCTURE: CPT

## 2024-06-11 PROCEDURE — 80061 LIPID PANEL: CPT

## 2024-06-11 PROCEDURE — 99396 PREV VISIT EST AGE 40-64: CPT | Performed by: FAMILY MEDICINE

## 2024-06-11 PROCEDURE — 85025 COMPLETE CBC W/AUTO DIFF WBC: CPT

## 2024-06-11 PROCEDURE — 82306 VITAMIN D 25 HYDROXY: CPT

## 2024-06-11 PROCEDURE — 3078F DIAST BP <80 MM HG: CPT | Performed by: FAMILY MEDICINE

## 2024-06-11 RX ORDER — TIZANIDINE 4 MG/1
4 TABLET ORAL EVERY 6 HOURS PRN
Qty: 56 TABLET | Refills: 0 | Status: SHIPPED | OUTPATIENT
Start: 2024-06-11

## 2024-06-11 RX ORDER — MELOXICAM 15 MG/1
15 TABLET ORAL DAILY
COMMUNITY
Start: 2024-06-01

## 2024-06-11 NOTE — PROGRESS NOTES
Wellness Exam    REASON FOR VISIT:    Parisa Cooney is a 44 year old female who presents for an Annual Health Assessment.    Current Complaints: No complaints at this time.   Imms- Up to date. Does not want covid vaccine at this time.    Cervical cancer screening- Will return for well women examination discussed at visit today need for pap.     Breast cancer screening- Next due is 4/29/2024    Colon cancer screening- Not needed per guidelines, will need next year at age 45    Osteoporosis screening- not needed per guidelines.     Diet/exercise- Attempting to exercise more, watching diet attempting to lose weight.     Dental/Eye Check up-  Recommended pt see dentist once every 6 months for a cleaning and once every year for an eye exam.   Reported Health: States overall well, financial stressors due to job loss and husbands work injury. 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     At any time do you feel concerned for the safety/well-being of yourself and/or your children, in your home or elsewhere?: No    CAGE:     Cut: Have you ever felt you should Cut down on your drinking?: No    Annoyed: Have people Annoyed you by criticizing your drinking?: No    Guilty: Have you ever felt bad or Guilty about your drinking?: No    Eye Opener: Have you ever had a drink first thing in the morning to steady your nerves or to get rid of a hangover (Eye opener)?: No    Scoring  Total Score: 0       ALLERGIES:   No Known Allergies    CURRENT MEDICATIONS:   Current Outpatient Medications   Medication Sig Dispense Refill    Meloxicam 15 MG Oral Tab Take 1 tablet (15 mg total) by mouth daily.      MONTELUKAST 10 MG Oral Tab TAKE 1 TABLET BY MOUTH EVERY DAY 90 tablet 0    VENLAFAXINE  MG Oral Capsule SR 24 Hr TAKE 1 CAPSULE BY MOUTH EVERY DAY 90 capsule 0    VENLAFAXINE ER 75 MG Oral Capsule SR 24 Hr TAKE 1 CAPSULE BY MOUTH EVERY DAY 90 capsule 0    clonazePAM 1 MG Oral Tab  Take 1 tablet (1 mg total) by mouth nightly as needed for Anxiety. 90 tablet 0    tiZANidine 4 MG Oral Tab Take 1 tablet (4 mg total) by mouth every 6 (six) hours as needed. 56 tablet 0    ALPRAZolam 0.25 MG Oral Tab Take 1 tablet (0.25 mg total) by mouth daily as needed. 30 tablet 1    Multiple Vitamins-Minerals (MULTI-VITAMIN/MINERALS) Oral Tab Take 1 tablet by mouth daily.      Omeprazole 40 MG Oral Capsule Delayed Release Take 1 capsule (40 mg total) by mouth daily. (Patient not taking: Reported on 2024) 90 capsule 0      MEDICAL INFORMATION:   Past Medical History:    Anesthesia complication    Anxiety and depression    Anxiety state    B12 deficiency    Back problem    Calculus of kidney    COVID-19    Kidney stone    Neuropathy    Obesity (BMI 30-39.9)    Osteoarthritis    Paresthesias    PONV (postoperative nausea and vomiting)    Rosacea    Visual impairment    GLASSES    Vitamin D deficiency      Past Surgical History:   Procedure Laterality Date      , ,     Other Right 2017    thumb surgery     Other surgical history  2016    Cystoscopy and Stent Removal    Tonsillectomy      Tubal ligation        Family History   Problem Relation Age of Onset    Diabetes Mother       SOCIAL HISTORY:   Social History     Socioeconomic History    Marital status:    Tobacco Use    Smoking status: Never    Smokeless tobacco: Never   Vaping Use    Vaping status: Never Used   Substance and Sexual Activity    Alcohol use: No    Drug use: No    Sexual activity: Yes   Other Topics Concern    Caffeine Concern Yes     Comment: 2 cups daily    Stress Concern No    Weight Concern No    Special Diet No    Exercise No     Comment: 2-3 times a week     Seat Belt Yes          REVIEW OF SYSTEMS:   Constitutional: Negative for fever, chills and fatigue.   HENT: Negative for hearing loss, congestion, sore throat and neck pain.    Eyes: Negative for pain and visual disturbance.   Respiratory:  Negative for cough and shortness of breath.    Cardiovascular: Negative for chest pain and palpitations.   Gastrointestinal: Negative for nausea, vomiting, abdominal pain and diarrhea.   Genitourinary: Negative for urgency, frequency and difficulty urinating.   Musculoskeletal: Negative for arthralgias and no gait problem.   Skin: Negative for color change and rash.   Neurological: Negative for tremors, weakness and numbness.   Hematological: Negative for adenopathy. Does not bruise/bleed easily.   Psychiatric/Behavioral: Negative for confusion and agitation. The patient is not nervous/anxious.    EXAM:   /76 (BP Location: Left arm, Patient Position: Sitting, Cuff Size: adult)   Pulse 79   Temp 97.8 °F (36.6 °C) (Temporal)   Resp 18   Ht 5' 5\" (1.651 m)   Wt 239 lb 3.2 oz (108.5 kg)   LMP 06/03/2024 (Exact Date)   SpO2 99%   BMI 39.80 kg/m²    Patient's last menstrual period was 06/03/2024 (exact date).   Constitutional: She appears her stated age, nourished, and pleasant. Vital signs reviewed as noted  Head: Normocephalic and atraumatic.   Nose: Nose normal.   Eyes: EOM are normal. Pupils are equal, round, and reactive to light. No scleral icterus.   Neck: Normal range of motion. No thyromegaly present.   Cardiovascular: Normal rate, regular rhythm and normal heart sounds.  Exam reveals no friction rub.    No murmur heard.  Pulmonary/Chest: Effort normal and breath sounds normal. She has no wheezes. She has no rales.   Abdominal: Soft. Bowel sounds are normal. There is no tenderness.   Musculoskeletal: Normal range of motion. She exhibits no edema.   Lymphadenopathy:    She has no cervical adenopathy or supraclavicular adenopathy.   Neurological: She is alert and oriented to person, place, and time. She has normal reflexes.   Skin: Skin is warm. No rash noted. No erythema.  Psychiatric: She has a normal mood and affect and her behavior is normal.     ASSESSMENT AND OTHER RELEVANT CHRONIC CONDITIONS:    Parisa Cooney is a 44 year old female who presents for an Annual Health Assessment.   1. Wellness examination    2. Laboratory examination ordered as part of a routine general medical examination    3. Vitamin D deficiency    4. Encounter for PPD test        PLAN SUMMARY:   Parisa Cooney is a 44 year old female  Age appropriate cancer screening, labs, safety, immunizations were discussed with the patient and ordered as follows:    Parisa was seen today for physical.    Diagnoses and all orders for this visit:    Wellness examination    Discussion about general health and wellness screening.   Patient is agreeable to getting lab work done to evaluate and monitor as part of screening and prevention of health issues.  Discussion about general diet and increasing activity.  Discussion about taking daily multivitamin.   Will discuss any abnormal values if they arise.    Laboratory examination ordered as part of a routine general medical examination  -     CBC With Differential With Platelet; Future  -     Comp Metabolic Panel (14); Future  -     Lipid Panel; Future  -     TSH W Reflex To Free T4; Future    Vitamin D deficiency  -     Vitamin D [E]; Future    Encounter for PPD test  -     Quantiferon TB Plus; Future        Orders Placed This Encounter   Procedures    CBC With Differential With Platelet    Comp Metabolic Panel (14)    Lipid Panel    TSH W Reflex To Free T4    Vitamin D [E]    Quantiferon TB Plus         Immunization History   Administered Date(s) Administered    >=3 YRS FLUZONE OR FLUARIX QUAD PRESERVE FREE SINGLE DOSE (96473) FLU CLINIC 10/17/2017    Covid-19 Vaccine Moderna 100 mcg/0.5 ml 02/12/2021, 03/12/2021, 10/31/2021    DTAP 10/01/2003, 11/01/2015    DTAP INFANRIX 10/01/2003    FLULAVAL 6 months & older 0.5 ml Prefilled syringe (08359) 10/01/2018, 09/10/2019, 12/01/2020, 11/01/2022    FLUZONE 6 months and older PFS 0.5 ml (27892) 10/17/2017, 10/01/2018, 09/10/2019, 12/01/2020, 09/26/2023     Influenza 11/01/2015, 11/01/2016, 10/17/2017       Patient Active Problem List   Diagnosis    Pain in joint, shoulder region    Rosacea    Heel pain    Obesity (BMI 30-39.9)    Nephrolithiasis    Paresthesias    Neuropathy (HCC)    B12 deficiency    Vitamin D deficiency    Numbness and tingling of both feet    Numbness and tingling of hand    Heaviness of upper extremity    Umbilical hernia    Primary insomnia    Anxiety and depression    Hemorrhagic cyst of ovary    DDD (degenerative disc disease), cervical    Cervical disc herniation    Cervical radiculopathy    Anxiety    Foraminal stenosis of cervical region     PREVENTIVE VISIT,NEW,40-64

## 2024-06-11 NOTE — TELEPHONE ENCOUNTER
Requesting     Disp Refills Start End    tiZANidine 4 MG Oral Tab 56 tablet 0 4/7/2024 --   Sig - Route: Take 1 tablet (4 mg total) by mouth every 6 (six) hours as needed. - Oral   Sent to pharmacy as: tiZANidine HCl 4 MG Oral Tablet (Zanaflex)     LOV: 6/11/2024 w/Masood for wellness visit  RTC: prn     Initial OV for medication: 7/28/2023 w/Masood for neck pain and discomfort  2. Muscle spasms of neck  -     tiZANidine HCl; Take 1 tablet (4 mg total) by mouth every 6 (six) hours as needed.  Dispense: 56 tablet; Refill: 0  -     OP REFERRAL PAIN MANGEMENT  Last Relevant Labs: 10/28/2022    Filled:     Dispensed Written Strength Quantity Refills Days Supply Provider Pharmacy    TIZANIDINE HCL 4 MG TABLET 04/21/2024 04/07/2024  56 each  14 Masood Reece APRN [id number]...     No future appointments.      Rx pended  No protocol  Routed to provider for review

## 2024-06-13 LAB
M TB IFN-G CD4+ T-CELLS BLD-ACNC: 0.09 IU/ML
M TB TUBERC IFN-G BLD QL: NEGATIVE
M TB TUBERC IGNF/MITOGEN IGNF CONTROL: >10 IU/ML
QFT TB1 AG MINUS NIL: 0 IU/ML
QFT TB2 AG MINUS NIL: -0.01 IU/ML

## 2024-06-19 ENCOUNTER — PATIENT MESSAGE (OUTPATIENT)
Dept: FAMILY MEDICINE CLINIC | Facility: CLINIC | Age: 44
End: 2024-06-19

## 2024-06-21 RX ORDER — ERGOCALCIFEROL 1.25 MG/1
50000 CAPSULE ORAL WEEKLY
Qty: 12 CAPSULE | Refills: 0 | Status: SHIPPED | OUTPATIENT
Start: 2024-06-21

## 2024-06-21 NOTE — TELEPHONE ENCOUNTER
From: Masood Reece  To: Parisa Cooney  Sent: 6/19/2024 2:09 PM CDT  Subject: vitamin d    Parisa Cooney  Your vitamin d was significantly low. I have sent a prescription for 28722 units of vitamin d to be taken once a week for 12 week. I would also like you to take a daily multivitamin and 1000 units of vitamin d daily. If you have any questions please call the office.     Thank you,   Masood

## 2024-07-12 ENCOUNTER — OFFICE VISIT (OUTPATIENT)
Dept: FAMILY MEDICINE CLINIC | Facility: CLINIC | Age: 44
End: 2024-07-12
Payer: COMMERCIAL

## 2024-07-12 VITALS
DIASTOLIC BLOOD PRESSURE: 74 MMHG | BODY MASS INDEX: 39.68 KG/M2 | OXYGEN SATURATION: 98 % | HEART RATE: 88 BPM | TEMPERATURE: 98 F | WEIGHT: 238.19 LBS | SYSTOLIC BLOOD PRESSURE: 118 MMHG | HEIGHT: 65 IN | RESPIRATION RATE: 18 BRPM

## 2024-07-12 DIAGNOSIS — E66.9 OBESITY (BMI 30-39.9): Primary | ICD-10-CM

## 2024-07-12 PROCEDURE — 3008F BODY MASS INDEX DOCD: CPT | Performed by: FAMILY MEDICINE

## 2024-07-12 PROCEDURE — 3078F DIAST BP <80 MM HG: CPT | Performed by: FAMILY MEDICINE

## 2024-07-12 PROCEDURE — 3074F SYST BP LT 130 MM HG: CPT | Performed by: FAMILY MEDICINE

## 2024-07-12 PROCEDURE — 99213 OFFICE O/P EST LOW 20 MIN: CPT | Performed by: FAMILY MEDICINE

## 2024-07-12 RX ORDER — TIRZEPATIDE 2.5 MG/.5ML
2.5 INJECTION, SOLUTION SUBCUTANEOUS WEEKLY
Qty: 2 ML | Refills: 0 | Status: SHIPPED | OUTPATIENT
Start: 2024-07-12 | End: 2024-08-03

## 2024-07-13 ENCOUNTER — PATIENT MESSAGE (OUTPATIENT)
Dept: FAMILY MEDICINE CLINIC | Facility: CLINIC | Age: 44
End: 2024-07-13

## 2024-07-15 ENCOUNTER — TELEPHONE (OUTPATIENT)
Dept: FAMILY MEDICINE CLINIC | Facility: CLINIC | Age: 44
End: 2024-07-15

## 2024-07-15 NOTE — TELEPHONE ENCOUNTER
Received fax from [employer] that Zepbound is not covered under patient plan, new medication is required. No preferred medications were listed on fax. Routed to Masood Reece for review.

## 2024-07-15 NOTE — TELEPHONE ENCOUNTER
Received fax from [employer] a Prior Authorization is required for Zepbound.     Placed in MA's folder

## 2024-07-15 NOTE — TELEPHONE ENCOUNTER
From: Parisa Cooney  To: Masood Reece  Sent: 7/13/2024 9:27 AM CDT  Subject: Medication not covered    Niki Correia,    The medication for weight loss was not covered by my insurance. Unless we need more info, would we be able to try a different one.    Thank you,  Parisa Cooney

## 2024-07-16 NOTE — PROGRESS NOTES
Subjective:   Parisa Cooney is a 44 year old female who presents for Weight Loss (Patient here to discuss weight loss options.)       History/Other:    Chief Complaint Reviewed and Verified  Nursing Notes Reviewed and   Verified  Tobacco Reviewed  Allergies Reviewed  Medications Reviewed    Problem List Reviewed  Medical History Reviewed  Surgical History   Reviewed  OB Status Reviewed  Family History Reviewed  Social History   Reviewed         Tobacco:  She has never smoked tobacco.    Current Outpatient Medications   Medication Sig Dispense Refill    Tirzepatide-Weight Management (ZEPBOUND) 2.5 MG/0.5ML Subcutaneous Solution Auto-injector Inject 2.5 mg into the skin once a week for 4 doses. 2 mL 0    ergocalciferol 1.25 MG (81943 UT) Oral Cap Take 1 capsule (50,000 Units total) by mouth once a week. 12 capsule 0    tiZANidine 4 MG Oral Tab Take 1 tablet (4 mg total) by mouth every 6 (six) hours as needed. 56 tablet 0    Meloxicam 15 MG Oral Tab Take 1 tablet (15 mg total) by mouth daily.      MONTELUKAST 10 MG Oral Tab TAKE 1 TABLET BY MOUTH EVERY DAY 90 tablet 0    VENLAFAXINE  MG Oral Capsule SR 24 Hr TAKE 1 CAPSULE BY MOUTH EVERY DAY 90 capsule 0    VENLAFAXINE ER 75 MG Oral Capsule SR 24 Hr TAKE 1 CAPSULE BY MOUTH EVERY DAY 90 capsule 0    clonazePAM 1 MG Oral Tab Take 1 tablet (1 mg total) by mouth nightly as needed for Anxiety. 90 tablet 0    ALPRAZolam 0.25 MG Oral Tab Take 1 tablet (0.25 mg total) by mouth daily as needed. 30 tablet 1    Multiple Vitamins-Minerals (MULTI-VITAMIN/MINERALS) Oral Tab Take 1 tablet by mouth daily.      Omeprazole 40 MG Oral Capsule Delayed Release Take 1 capsule (40 mg total) by mouth daily. (Patient not taking: Reported on 6/11/2024) 90 capsule 0         Review of Systems:  Review of Systems   Constitutional: Negative.    Eyes: Negative.    Respiratory: Negative.     Cardiovascular: Negative.    Gastrointestinal: Negative.    Genitourinary: Negative.     Musculoskeletal: Negative.    Skin: Negative.    Allergic/Immunologic: Negative.    Neurological: Negative.    Hematological: Negative.    Psychiatric/Behavioral: Negative.       Objective:   /74 (BP Location: Left arm, Patient Position: Sitting, Cuff Size: large)   Pulse 88   Temp 97.7 °F (36.5 °C) (Temporal)   Resp 18   Ht 5' 5\" (1.651 m)   Wt 238 lb 3.2 oz (108 kg)   LMP 07/02/2024 (Exact Date)   SpO2 98%   BMI 39.64 kg/m²  Estimated body mass index is 39.64 kg/m² as calculated from the following:    Height as of this encounter: 5' 5\" (1.651 m).    Weight as of this encounter: 238 lb 3.2 oz (108 kg).  Physical Exam  Constitutional:       Appearance: She is well-developed.   HENT:      Head: Normocephalic and atraumatic.      Nose: Nose normal.      Mouth/Throat:      Mouth: Mucous membranes are moist.      Pharynx: Oropharynx is clear.   Eyes:      Conjunctiva/sclera: Conjunctivae normal.   Cardiovascular:      Rate and Rhythm: Normal rate and regular rhythm.      Heart sounds: Normal heart sounds.   Pulmonary:      Effort: Pulmonary effort is normal.      Breath sounds: Normal breath sounds.   Abdominal:      Palpations: Abdomen is soft.   Musculoskeletal:         General: Normal range of motion.   Skin:     General: Skin is warm and dry.   Neurological:      General: No focal deficit present.      Mental Status: She is alert and oriented to person, place, and time.   Psychiatric:         Mood and Affect: Mood normal.         Behavior: Behavior normal.         Thought Content: Thought content normal.         Judgment: Judgment normal.       Assessment & Plan:   1. Obesity (BMI 30-39.9) (Primary)  -     Zepbound; Inject 2.5 mg into the skin once a week for 4 doses.  Dispense: 2 mL; Refill: 0  Maintain diet and exercise.    Return in 4 weeks (on 8/9/2024).    BRITNI Gabriel, 7/16/2024, 12:22 PM

## 2024-07-22 DIAGNOSIS — F51.01 PRIMARY INSOMNIA: ICD-10-CM

## 2024-07-22 DIAGNOSIS — M62.830 PARASPINAL MUSCLE SPASM: ICD-10-CM

## 2024-07-22 DIAGNOSIS — M62.838 MUSCLE SPASMS OF NECK: ICD-10-CM

## 2024-07-22 DIAGNOSIS — F41.9 ANXIETY: ICD-10-CM

## 2024-07-23 RX ORDER — CLONAZEPAM 1 MG/1
1 TABLET ORAL NIGHTLY PRN
Qty: 90 TABLET | Refills: 0 | Status: SHIPPED | OUTPATIENT
Start: 2024-07-23

## 2024-07-26 RX ORDER — TIZANIDINE 4 MG/1
4 TABLET ORAL EVERY 6 HOURS PRN
Qty: 56 TABLET | Refills: 0 | Status: SHIPPED | OUTPATIENT
Start: 2024-07-26

## 2024-07-26 NOTE — TELEPHONE ENCOUNTER
Requesting     Disp Refills Start End     tiZANidine 4 MG Oral Tab 56 tablet 0 6/11/2024 --    Sig - Route: Take 1 tablet (4 mg total) by mouth every 6 (six) hours as needed. - Oral    Sent to pharmacy as: tiZANidine HCl 4 MG Oral Tablet (Zanaflex)      LOV: 7/12/2024 w/Masood for weight loss  RTC: Return in 4 weeks (on 8/9/2024).     Filled:     Dispensed Written Strength Quantity Refills Days Supply Provider Pharmacy    TIZANIDINE HCL 04/21/2024 04/07/2024 4 mg 56  14 NANCY HORTA [id number]       No future appointments.    Rx pended  No protocol  Routed to provider for review

## 2024-08-03 NOTE — TELEPHONE ENCOUNTER
3/23/2020    Shady Gloss    Regarding: Rescheduling March 26, 2020 office visits due to the [place] 19 pandemic. Discussion: Spoke with her this evening. She is doing well. All of her preoperative symptoms are gone.     I gave her instructions on we with patient

## 2024-08-16 ENCOUNTER — PATIENT MESSAGE (OUTPATIENT)
Dept: FAMILY MEDICINE CLINIC | Facility: CLINIC | Age: 44
End: 2024-08-16

## 2024-08-16 NOTE — TELEPHONE ENCOUNTER
From: Parisa Cooney  To: Masood Reece  Sent: 8/16/2024 11:23 AM CDT  Subject: Knee Pain    Niki Correia,    I have been having extreme knee pain in my left knee for about a month now. 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 I feel tendons or a pull of something behind the knee. If I am off it for awhile and then get up and walk it is awful and hard to move. I know there is arthritis in this knee but what can I do to get through the day. With starting a new job it is tough to get time off to be seen. Any suggestions.     Thank you,  Parisa Cooney

## 2024-08-26 ENCOUNTER — OFFICE VISIT (OUTPATIENT)
Dept: FAMILY MEDICINE CLINIC | Facility: CLINIC | Age: 44
End: 2024-08-26
Payer: COMMERCIAL

## 2024-08-26 VITALS
BODY MASS INDEX: 39.79 KG/M2 | DIASTOLIC BLOOD PRESSURE: 70 MMHG | HEIGHT: 65 IN | TEMPERATURE: 98 F | WEIGHT: 238.81 LBS | RESPIRATION RATE: 18 BRPM | OXYGEN SATURATION: 99 % | SYSTOLIC BLOOD PRESSURE: 120 MMHG | HEART RATE: 82 BPM

## 2024-08-26 DIAGNOSIS — G89.29 CHRONIC PAIN OF LEFT KNEE: Primary | ICD-10-CM

## 2024-08-26 DIAGNOSIS — M25.562 CHRONIC PAIN OF LEFT KNEE: Primary | ICD-10-CM

## 2024-08-26 PROCEDURE — 3074F SYST BP LT 130 MM HG: CPT | Performed by: FAMILY MEDICINE

## 2024-08-26 PROCEDURE — 3078F DIAST BP <80 MM HG: CPT | Performed by: FAMILY MEDICINE

## 2024-08-26 PROCEDURE — 99214 OFFICE O/P EST MOD 30 MIN: CPT | Performed by: FAMILY MEDICINE

## 2024-08-26 PROCEDURE — 3008F BODY MASS INDEX DOCD: CPT | Performed by: FAMILY MEDICINE

## 2024-08-26 RX ORDER — METHYLPREDNISOLONE 4 MG
TABLET, DOSE PACK ORAL
Qty: 21 EACH | Refills: 0 | Status: SHIPPED | OUTPATIENT
Start: 2024-08-26

## 2024-08-26 RX ORDER — MELOXICAM 15 MG/1
15 TABLET ORAL DAILY
Qty: 30 TABLET | Refills: 0 | Status: SHIPPED | OUTPATIENT
Start: 2024-08-26

## 2024-08-26 NOTE — PROGRESS NOTES
CHIEF COMPLAINT:     Chief Complaint   Patient presents with    Knee Pain     Patient states she has been having left knee pain for about a month and a half, denies any injury. Patient states she has been wearing a brace, has been elevating leg as much as possible and is icing as well.       HPI:   Parisa Cooney is a 44 year old female. Patient's presenting with left knee pain and discomfort.    Cause - unknown exact cause. 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  Onset - 1.5 months.  Location of pain - left knee, medial and lateral  Pain described as sharp with movement, aching and pressure throughout the day.  Pain scale - 6/10  Radiation - hamstrings   Pain is aggravated by movement, use and palpation  Pain is alleviated by rest, but nothing takes all the pain away.   Associated symptoms:  pain to the hamstring, pain going up and down stairs.   Care prior to arrival consisted of  rest, NSAID, elevation and ice, with minimal relief.    Current Outpatient Medications   Medication Sig Dispense Refill    MELOXICAM 15 MG Oral Tab TAKE 1 TABLET BY MOUTH EVERY DAY 30 tablet 0    methylPREDNISolone (MEDROL) 4 MG Oral Tablet Therapy Pack As directed. 21 each 0    TIZANIDINE 4 MG Oral Tab TAKE 1 TABLET(4 MG) BY MOUTH EVERY 6 HOURS AS NEEDED 56 tablet 0    clonazePAM 1 MG Oral Tab Take 1 tablet (1 mg total) by mouth nightly as needed for Anxiety. 90 tablet 0    ergocalciferol 1.25 MG (26251 UT) Oral Cap Take 1 capsule (50,000 Units total) by mouth once a week. 12 capsule 0    MONTELUKAST 10 MG Oral Tab TAKE 1 TABLET BY MOUTH EVERY DAY 90 tablet 0    VENLAFAXINE  MG Oral Capsule SR 24 Hr TAKE 1 CAPSULE BY MOUTH EVERY DAY 90 capsule 0    VENLAFAXINE ER 75 MG Oral Capsule SR 24 Hr TAKE 1 CAPSULE BY MOUTH EVERY DAY 90 capsule 0    ALPRAZolam 0.25 MG Oral Tab Take 1 tablet (0.25 mg total) by mouth daily as needed. 30 tablet 1    Multiple Vitamins-Minerals  (MULTI-VITAMIN/MINERALS) Oral Tab Take 1 tablet by mouth daily.      Omeprazole 40 MG Oral Capsule Delayed Release Take 1 capsule (40 mg total) by mouth daily. (Patient not taking: Reported on 6/11/2024) 90 capsule 0      Past Medical History:    Anesthesia complication    Anxiety and depression    Anxiety state    B12 deficiency    Back problem    Calculus of kidney    COVID-19    Kidney stone    Neuropathy    Obesity (BMI 30-39.9)    Osteoarthritis    Paresthesias    PONV (postoperative nausea and vomiting)    Rosacea    Visual impairment    GLASSES    Vitamin D deficiency      Social History:  Social History     Socioeconomic History    Marital status:    Tobacco Use    Smoking status: Never    Smokeless tobacco: Never   Vaping Use    Vaping status: Never Used   Substance and Sexual Activity    Alcohol use: No    Drug use: No    Sexual activity: Yes   Other Topics Concern    Caffeine Concern Yes     Comment: 2 cups daily    Stress Concern No    Weight Concern No    Special Diet No    Exercise No     Comment: 2-3 times a week     Seat Belt Yes        REVIEW OF SYSTEMS:   GENERAL HEALTH: feels well otherwise  SKIN: denies any unusual skin lesions or rashes, did have immediate swelling to area.   RESPIRATORY: denies shortness of breath with exertion  CARDIOVASCULAR: denies chest pain on exertion  GI: denies abdominal pain and denies heartburn  NEURO: denies numbness and tingling sensation.     EXAM:   /70 (BP Location: Left arm, Patient Position: Sitting, Cuff Size: adult)   Pulse 82   Temp 97.7 °F (36.5 °C) (Temporal)   Resp 18   Ht 5' 5\" (1.651 m)   Wt 238 lb 12.8 oz (108.3 kg)   LMP 08/24/2024 (Exact Date)   SpO2 99%   BMI 39.74 kg/m²   GENERAL: well developed, well nourished,in no apparent distress  SKIN: no rashes,no suspicious lesions, skin's intact.   HEENT: atraumatic, normocephalic,ears and throat are clear  NECK: supple,no adenopathy,no bruits  LUNGS: clear to auscultation  CARDIO: RRR  without murmur  GI: good BS's,no masses, HSM or tenderness  EXTREMITIES: no cyanosis, clubbing or edema  NEURO: normal sensation distally and normal cap refill distally.  Exam of left knee    - swelling: minor  - tenderness: medial and lateral, posterior  - deformity/defect:  none obvious  - crepitus: minor with movement   - ecchymosis/bruising: none  - tendon / deep structures intact: yes  - Range of motion: normal with minor crepitus  - DP/PT pulses: 2+  - sensation: intact  - Motor exam/strength/hand : normal.        ASSESSMENT AND PLAN:   Assessment:   Encounter Diagnosis   Name Primary?    Chronic pain of left knee Yes         Plan: Rest, ice, elevation, NSAID, rx medrol dose pack.       Follow-up with PCP for worsening symptoms or no improvement  Medication use, dose, and possible side effects discuss.       Procedures    Physical Therapy Referral - [facility]    Ortho Referral - In Network    MRI KNEE, LEFT (CPT=73721)    XR Knee (non-replaced) left complete (4 or more views)- [facility] Consult Only       Meds & Refills for this Visit:  Requested Prescriptions     Signed Prescriptions Disp Refills    methylPREDNISolone (MEDROL) 4 MG Oral Tablet Therapy Pack 21 each 0     Sig: As directed.     The patient and parents indicate understanding of these issues and agrees to the plan.  The patient indicates understanding of these issues and agrees to the plan.

## 2024-08-26 NOTE — TELEPHONE ENCOUNTER
Name from pharmacy: MELOXICAM 15MG TABLETS         Will file in chart as: MELOXICAM 15 MG Oral Tab    Sig: TAKE 1 TABLET BY MOUTH EVERY DAY    Disp: 30 tablet    Refills: 0 (Pharmacy requested: Not specified)    Start: 8/25/2024    Class: Normal    Non-formulary    Last ordered: 2 months ago (6/11/2024) by Reported External/Patient    Last refill: 7/23/2024    Rx #: [id number]    Non-Narcotic Pain Medication Protocol [id number]/25/2024 03:12 AM   Protocol Details In person appointment or virtual visit in the past 6 mos or appointment in next 3 mos      To be filled at: [id number] [address] AT [facility] [address] [facility] [address], [phone], [phone]        LOV: 7/12/2024  RTC: 4 weeks  Last Relevant Labs: 6/11/2024  Filled: 6/1/2024 #30 with 0 refills      Refill request approved per protocol.    Future Appointments   Date Time Provider Department Center   8/26/2024  7:00 AM Masood Reece APRN [facility] [address] [facility] [address]

## 2024-09-07 RX ORDER — VENLAFAXINE HYDROCHLORIDE 75 MG/1
75 CAPSULE, EXTENDED RELEASE ORAL DAILY
Qty: 90 CAPSULE | Refills: 0 | Status: SHIPPED | OUTPATIENT
Start: 2024-09-07

## 2024-09-09 DIAGNOSIS — M62.830 PARASPINAL MUSCLE SPASM: ICD-10-CM

## 2024-09-09 DIAGNOSIS — M62.838 MUSCLE SPASMS OF NECK: ICD-10-CM

## 2024-09-10 RX ORDER — MONTELUKAST SODIUM 10 MG/1
10 TABLET ORAL DAILY
Qty: 90 TABLET | Refills: 0 | Status: SHIPPED | OUTPATIENT
Start: 2024-09-10

## 2024-09-10 RX ORDER — VENLAFAXINE HYDROCHLORIDE 150 MG/1
150 CAPSULE, EXTENDED RELEASE ORAL DAILY
Qty: 90 CAPSULE | Refills: 0 | Status: SHIPPED | OUTPATIENT
Start: 2024-09-10

## 2024-09-11 NOTE — TELEPHONE ENCOUNTER
Requesting Tizanidine 4mg  Last OV: 8/26/24  RTC: not noted  Last Rx'd 7/26/24 #56 with 0 refills    Future Appointments   Date Time Provider Department Center   10/13/2024  8:00 AM  [facility] ([facility].5T W[facility]       Non-protocol med:  Rx pended and routed for approval/denial

## 2024-09-19 ENCOUNTER — HOSPITAL ENCOUNTER (EMERGENCY)
Age: 44
Discharge: HOME | End: 2024-09-19
Attending: EMERGENCY MEDICINE
Payer: COMMERCIAL

## 2024-09-19 ENCOUNTER — APPOINTMENT (OUTPATIENT)
Dept: GENERAL RADIOLOGY | Age: 44
End: 2024-09-19
Payer: COMMERCIAL

## 2024-09-19 VITALS
SYSTOLIC BLOOD PRESSURE: 134 MMHG | DIASTOLIC BLOOD PRESSURE: 86 MMHG | RESPIRATION RATE: 17 BRPM | HEART RATE: 78 BPM | TEMPERATURE: 99 F | HEIGHT: 65 IN | OXYGEN SATURATION: 99 % | WEIGHT: 225 LBS | BODY MASS INDEX: 37.49 KG/M2

## 2024-09-19 DIAGNOSIS — S83.92XA SPRAIN OF LEFT KNEE, UNSPECIFIED LIGAMENT, INITIAL ENCOUNTER: Primary | ICD-10-CM

## 2024-09-19 PROCEDURE — 99284 EMERGENCY DEPT VISIT MOD MDM: CPT

## 2024-09-19 PROCEDURE — 73560 X-RAY EXAM OF KNEE 1 OR 2: CPT

## 2024-09-19 PROCEDURE — 99283 EMERGENCY DEPT VISIT LOW MDM: CPT

## 2024-09-20 NOTE — ED INITIAL ASSESSMENT (HPI)
43 y/o F arrives to ED accompanied by family with c/o left knee pain that started after her dog ran into her knee today and her knee hyperextended around 1630 this afternoon. Patient reports she had some pre-existing knee problems for which she is scheduled for an MRI.

## 2024-09-20 NOTE — DISCHARGE INSTRUCTIONS
Use ace wrap and crutches    Tylenol or motrin for pain    Ice 3 times daily    Weight bear as tolerated

## 2024-09-20 NOTE — ED PROVIDER NOTES
Patient Seen in: [facility] [place]      History     Chief Complaint   Patient presents with    Leg or Foot Injury     Stated Complaint: left knee pain after her dog ran into her knee    Subjective:   HPI    Patient is a 44-year-old female presents to ED for evaluation of left knee pain.  Patient states she has been having pre-existing left knee pain even before the minor trauma today.  She apparently is scheduled for outpatient MRI of her left knee mid-October.  Patient states her dog ran into her knee today and she hyperextended her left knee.  She has pain with weightbearing.  Denies other injury or complaint    Objective:   Past Medical History:    Anesthesia complication    Anxiety and depression    Anxiety state    B12 deficiency    Back problem    Calculus of kidney    COVID-19    Kidney stone    Neuropathy    Obesity (BMI 30-39.9)    Osteoarthritis    Paresthesias    PONV (postoperative nausea and vomiting)    Rosacea    Visual impairment    GLASSES    Vitamin D deficiency              Past Surgical History:   Procedure Laterality Date      , ,     Other Right 2017    thumb surgery     Other surgical history  2016    Cystoscopy and Stent Removal    Tonsillectomy      Tubal ligation                  Social History     Socioeconomic History    Marital status:    Tobacco Use    Smoking status: Never     Passive exposure: Never    Smokeless tobacco: Never   Vaping Use    Vaping status: Never Used   Substance and Sexual Activity    Alcohol use: No    Drug use: No    Sexual activity: Yes   Other Topics Concern    Caffeine Concern Yes     Comment: 2 cups daily    Stress Concern No    Weight Concern No    Special Diet No    Exercise No     Comment: 2-3 times a week     Seat Belt Yes              Review of Systems    Positive for stated Chief Complaint: Leg or Foot Injury    Other systems are as noted in HPI.  Constitutional and vital signs reviewed.       All other systems reviewed and negative except as noted above.    Physical Exam     ED Triage Vitals [09/19/24 2001]   /90   Pulse 109   Resp 17   Temp 99.1 °F (37.3 °C)   Temp src Temporal   SpO2 96 %   O2 Device None (Room air)       Current Vitals:   Vital Signs  BP: 134/86  Pulse: 78  Resp: 17  Temp: 99.1 °F (37.3 °C)  Temp src: Temporal    Oxygen Therapy  SpO2: 99 %  O2 Device: None (Room air)            Physical Exam    Constitutional: Well-appearing in no acute distress  Musculoskeletal: Left knee without swelling or bruising.  Left patella nontender.  Mild left medial joint line pain.  Left lateral joint line nontender.  Negative Lever sign.  Good DP and PT pulses    ED Course   Labs Reviewed - No data to display          I personally reviewed xray films of left knee and independent interpretation shows no fracture.  I also reviewed formal xray report as read by radiology with findings below:       XR KNEE (1 OR 2 VIEWS), LEFT (CPT=73560)    Result Date: 9/19/2024  PROCEDURE:  XR KNEE (1 OR 2 VIEWS), LEFT (CPT=73560)  COMPARISON:  [facility], XR, XR KNEE, COMPLETE (4 OR MORE VIEWS), LEFT (CPT=73564), 6/24/2020, 10:39 AM.  INDICATIONS:  left knee pain after her dog ran into her knee  PATIENT STATED HISTORY: (As transcribed by Technologist)  Patient has left knee pain after her dog today ran into her knee. Patient has weight bearing and range of motion pain. Patient stated her pain is the anterior portion of the knee and the pain radiates up and down the leg.    FINDINGS:  There is mild arthritis involving all 3 compartments.  Relative sparing of the medial compartment.  No chondrocalcinosis.  No fracture, expansile lesion or erosion.  Possible small joint effusion.             CONCLUSION:  Negative for acute fracture.   LOCATION:  [facility]   Dictated by (CST): Giacomo Jenkins MD on 9/19/2024 at 8:47 PM     Finalized by (CST): Giacomo Jenkins MD on 9/19/2024 at 8:47 PM          MDM      Patient is a  44-year-old female presents to ED for evaluation of left knee pain.  Differential contusion, sprain, cartilaginous injury.  X-rays obtained showing no fracture.  Main tenderness is along the left medial joint line.  Recommend outpatient MRI of the knee to further evaluate.  Ace wrap given.  She declined crutches.  Tylenol Motrin for pain and ice 3 times daily.    Patient was screened and evaluated during this visit.   As a treating physician attending to the patient, I determined, within reasonable clinical confidence and prior to discharge, that an emergency medical condition was not or was no longer present.  There was no indication for further evaluation, treatment or admission on an emergency basis.  Comprehensive verbal and written discharge and follow-up instructions were provided to help prevent relapse or worsening.  Patient was instructed to follow-up with her primary care provider for further evaluation and treatment, but to return immediately to the ER for worsening, concerning, new, changing or persisting symptoms.  I discussed the case with the patient and they had no questions, complaints, or concerns.  Patient felt comfortable going home.                                       MDM    Disposition and Plan     Clinical Impression:  1. Sprain of left knee, unspecified ligament, initial encounter         Disposition:  Discharge  9/19/2024 10:18 pm    Follow-up:  Isabelle Cabrera MD  [address] [place] [id number]  [phone]    Follow up  As needed          Medications Prescribed:  Discharge Medication List as of 9/19/2024 10:30 PM

## 2024-10-01 ENCOUNTER — MED REC SCAN ONLY (OUTPATIENT)
Dept: FAMILY MEDICINE CLINIC | Facility: CLINIC | Age: 44
End: 2024-10-01

## 2024-10-02 ENCOUNTER — PATIENT MESSAGE (OUTPATIENT)
Dept: FAMILY MEDICINE CLINIC | Facility: CLINIC | Age: 44
End: 2024-10-02

## 2024-10-02 DIAGNOSIS — E66.9 OBESITY (BMI 30-39.9): Primary | ICD-10-CM

## 2024-10-02 RX ORDER — TIRZEPATIDE 2.5 MG/.5ML
2.5 INJECTION, SOLUTION SUBCUTANEOUS WEEKLY
Qty: 2 ML | Refills: 0 | Status: SHIPPED | OUTPATIENT
Start: 2024-10-02 | End: 2024-10-24

## 2024-10-02 NOTE — TELEPHONE ENCOUNTER
From: Parisa Cooney  To: Masood Reece  Sent: 10/2/2024 12:33 PM CDT  Subject: Obesity meds    Niki Correia,    I checked with my insurance and they should cover zepbound for weight loss. Could we try that med injections?    Thank you,  Parisa Cooney

## 2024-10-10 ENCOUNTER — PATIENT MESSAGE (OUTPATIENT)
Dept: FAMILY MEDICINE CLINIC | Facility: CLINIC | Age: 44
End: 2024-10-10

## 2024-10-11 NOTE — TELEPHONE ENCOUNTER
Future Appointments   Date Time Provider Department Center   10/13/2024  8:00 AM  MR RM2 (1.5T WIDE)  [facility]

## 2024-10-13 ENCOUNTER — HOSPITAL ENCOUNTER (OUTPATIENT)
Dept: MRI IMAGING | Facility: HOSPITAL | Age: 44
End: 2024-10-13
Attending: FAMILY MEDICINE
Payer: COMMERCIAL

## 2024-10-13 ENCOUNTER — HOSPITAL ENCOUNTER (OUTPATIENT)
Dept: MRI IMAGING | Facility: HOSPITAL | Age: 44
Discharge: HOME | End: 2024-10-13
Attending: FAMILY MEDICINE
Payer: COMMERCIAL

## 2024-10-13 DIAGNOSIS — G89.29 CHRONIC PAIN OF LEFT KNEE: ICD-10-CM

## 2024-10-13 DIAGNOSIS — M25.562 CHRONIC PAIN OF LEFT KNEE: ICD-10-CM

## 2024-10-13 PROCEDURE — 73721 MRI JNT OF LWR EXTRE W/O DYE: CPT | Performed by: FAMILY MEDICINE

## 2024-10-14 DIAGNOSIS — M62.830 PARASPINAL MUSCLE SPASM: ICD-10-CM

## 2024-10-14 DIAGNOSIS — F41.9 ANXIETY: ICD-10-CM

## 2024-10-14 DIAGNOSIS — M62.838 MUSCLE SPASMS OF NECK: ICD-10-CM

## 2024-10-14 DIAGNOSIS — M71.20 SYNOVIAL CYST OF KNEE, UNSPECIFIED LATERALITY: Primary | ICD-10-CM

## 2024-10-14 DIAGNOSIS — F51.01 PRIMARY INSOMNIA: ICD-10-CM

## 2024-10-15 RX ORDER — CLONAZEPAM 1 MG/1
1 TABLET ORAL NIGHTLY PRN
Qty: 90 TABLET | Refills: 0 | Status: SHIPPED | OUTPATIENT
Start: 2024-10-15

## 2024-10-21 DIAGNOSIS — M62.830 PARASPINAL MUSCLE SPASM: ICD-10-CM

## 2024-10-21 DIAGNOSIS — M62.838 MUSCLE SPASMS OF NECK: ICD-10-CM

## 2024-10-22 RX ORDER — MULTIVIT-MIN/IRON FUM/FOLIC AC 7.5 MG-4
1 TABLET ORAL DAILY
Qty: 90 TABLET | Refills: 3 | Status: SHIPPED | OUTPATIENT
Start: 2024-10-22

## 2024-10-23 ENCOUNTER — OFFICE VISIT (OUTPATIENT)
Dept: ORTHOPEDICS CLINIC | Facility: CLINIC | Age: 44
End: 2024-10-23
Payer: COMMERCIAL

## 2024-10-23 VITALS — HEIGHT: 65 IN | WEIGHT: 225 LBS | BODY MASS INDEX: 37.49 KG/M2

## 2024-10-23 DIAGNOSIS — M62.838 MUSCLE SPASMS OF NECK: ICD-10-CM

## 2024-10-23 DIAGNOSIS — M17.12 PRIMARY OSTEOARTHRITIS OF LEFT KNEE: Primary | ICD-10-CM

## 2024-10-23 DIAGNOSIS — M62.830 PARASPINAL MUSCLE SPASM: ICD-10-CM

## 2024-10-23 PROCEDURE — 3008F BODY MASS INDEX DOCD: CPT | Performed by: ORTHOPAEDIC SURGERY

## 2024-10-23 PROCEDURE — 99244 OFF/OP CNSLTJ NEW/EST MOD 40: CPT | Performed by: ORTHOPAEDIC SURGERY

## 2024-10-23 NOTE — PROGRESS NOTES
[facility] New Consult      Chief Complaint   Patient presents with    Knee Pain     LEFT KNEE PAIN, ONSET:2024  -Her dog ran into her knee   -Constant pain  -On and off swelling     HPI: The patient is a 44 year old female referred for orthopaedic consultation by BRITNI Gabriel with complaints of chronic left knee pain.  She noted pain and swelling without injury in July and has not improved with conservative measures included steroid pack.  Pain is localized to the medial and anterior aspect of the left knee.  There was an acute exacerbation when her puppy ran into the knee a couple of weeks ago prompting temporary use of crutches.  Activities of daily living including prolonged standing, walking, stair use and getting up out of chairs is typically symptomatic.  Activities have increased as she is taken on a new position as a  which has required her to get into small chairs and on the ground from time to time.    Past Medical History:    Anesthesia complication    Anxiety and depression    Anxiety state    B12 deficiency    Back problem    Calculus of kidney    COVID-19    Kidney stone    Neuropathy    Obesity (BMI 30-39.9)    Osteoarthritis    Paresthesias    PONV (postoperative nausea and vomiting)    Rosacea    Visual impairment    GLASSES    Vitamin D deficiency     Past Surgical History:   Procedure Laterality Date      , , 2009    Other Right 2017    thumb surgery     Other surgical history  2016    Cystoscopy and Stent Removal    Tonsillectomy      Tubal ligation       Current Outpatient Medications   Medication Sig Dispense Refill    Multiple Vitamins-Minerals (MULTI-VITAMIN/MINERALS) Oral Tab Take 1 tablet by mouth daily. 90 tablet 3    CLONAZEPAM 1 MG Oral Tab TAKE 1 TABLET(1 MG) BY MOUTH EVERY NIGHT AS NEEDED FOR ANXIETY 90 tablet 0    tiZANidine 4 MG Oral Tab Take 1 tablet (4 mg total) by mouth every 6 (six) hours as needed.  56 tablet 0    montelukast 10 MG Oral Tab Take 1 tablet (10 mg total) by mouth daily. 90 tablet 0    venlafaxine  MG Oral Capsule SR 24 Hr Take 1 capsule (150 mg total) by mouth daily. 90 capsule 0    VENLAFAXINE ER 75 MG Oral Capsule SR 24 Hr TAKE 1 CAPSULE BY MOUTH EVERY DAY 90 capsule 0    MELOXICAM 15 MG Oral Tab TAKE 1 TABLET BY MOUTH EVERY DAY 30 tablet 0    ergocalciferol 1.25 MG (41574 UT) Oral Cap Take 1 capsule (50,000 Units total) by mouth once a week. 12 capsule 0    ALPRAZolam 0.25 MG Oral Tab Take 1 tablet (0.25 mg total) by mouth daily as needed. 30 tablet 1    Omeprazole 40 MG Oral Capsule Delayed Release Take 1 capsule (40 mg total) by mouth daily. (Patient not taking: Reported on 10/23/2024) 90 capsule 0     Allergies[1]  Family History   Problem Relation Age of Onset    Diabetes Mother      Social History     Occupational History    Not on file   Tobacco Use    Smoking status: Never     Passive exposure: Never    Smokeless tobacco: Never   Vaping Use    Vaping status: Never Used   Substance and Sexual Activity    Alcohol use: No    Drug use: No    Sexual activity: Yes        ROS:  Complete ROS reviewed by me and non-contributory to the chief complaint except as mentioned above.    Physical Exam:    Ht 5' 5\" (1.651 m)   Wt 225 lb (102.1 kg)   LMP 08/24/2024 (Exact Date)   BMI 37.44 kg/m²   Constitutional: Well developed, well nourished 44 year old female  Psychological: NAD, alert and appropriate  Respiratory: Breathing comfortably on room air with RR of 10-14  Cardiac: Palpable distal pulses with pink warm extremities distally  Examination of the knees reveals neutral alignment.  There is no palpable effusion or warmth bilaterally.  Extensor mechanism is nontender to palpation at the insertions.  Intermittent click and crepitus is noted at the patellofemoral joint through an arc of motion estimated at 0-125 degrees.  Medial joint line is tender with minimal pain on Ruth's and  Ramo's tests.  Ligaments are stable on varus valgus stress with no pain and solid endpoints.  Lachman and posterior drawer are negative.  Neurovascular status is intact on sensory, motor and perfusion assessment distally.      Imaging: Multiple views of the left knee personally viewed, demonstrating maintained joint spaces with early spurring at the patellofemoral joint suggestive of helping osteoarthritis.  MRI confirms this finding.  No meniscus or ligamentous abnormalities seen.    MRI KNEE, LEFT (HOE=24727)    Result Date: 10/14/2024  CONCLUSION:  1. There is patellofemoral joint osteoarthritis and to a lesser degree, lateral femoral tibial compartment osteoarthritis.  There is subtle chondromalacia of the medial femoral tibial compartment without maru osteoarthritis. 2. Grade 1 chronic sprain of the proximal, superficial fibers of the MCL. 3. Mild joint effusion and small Baker's cyst.  No intra-articular bodies.   LOCATION:  [facility]          Dictated by (CST): Rach Pleitez DO on 10/14/2024 at 9:58 AM     Finalized by (CST): Rach Pleitez DO on 10/14/2024 at 10:36 AM       XR KNEE (1 OR 2 VIEWS), LEFT (CPT=73560)    Result Date: 9/19/2024  CONCLUSION:  Negative for acute fracture.   LOCATION:  [facility]   Dictated by (CST): Giacomo Jenkins MD on 9/19/2024 at 8:47 PM     Finalized by (CST): Giacomo Jenkins MD on 9/19/2024 at 8:47 PM          Assessment/Diagnoses:  There are no diagnoses linked to this encounter.    Plan:  I reviewed imaging and exam findings with the patient.  The diagnosis is degenerative joint disease of the knee.  We discussed the etiology, natural history and treatment options in detail.  Treatments include activity modification, weight loss, anti-inflammatory use and possible injections.  In the long term, the patient may become a candidate for total knee arthroplasty, but only if symptoms become severe despite exhaustive non-operative care.  For now, non-surgical treatment options are  recommended.  We discussed the option for knee corticosteroid injection along with the potential benefits, alternatives and associated risks.  Given minimal signs of synovitis or effusion, we also discussed the option for intra-articular viscosupplementation alternatives.  Monovisc informational brochure was provided.  Risk, benefits and alternatives to visco supplement injection were reviewed including but not limited to needle infection, hypersensitivity reaction or failed improvement.  Unlike cortisone, benefit from viscosupplementation may take up to 4 weeks before noticeable improvement.  The patient expressed understanding and a desire to proceed.  We will therefore submit for insurance precertification for Monovisc injection and see the patient back in follow-up for administration once approved.  All questions were answered and the patient verbalized understanding and appreciation.       Isabelle Cabrera MD, [facility]  Orthopaedic Surgery   Sports Medicine/Knee and Shoulder  [facility]  t: [phone]  f: [phone]           This document was partially prepared using Dragon Medical voice recognition software.  Although every attempt is made to correct errors during dictation, discrepancies may still exist.         [1] No Known Allergies

## 2024-10-24 RX ORDER — MELOXICAM 15 MG/1
15 TABLET ORAL DAILY
Qty: 30 TABLET | Refills: 0 | Status: SHIPPED | OUTPATIENT
Start: 2024-10-24

## 2024-10-24 RX ORDER — ERGOCALCIFEROL 1.25 MG/1
50000 CAPSULE, LIQUID FILLED ORAL WEEKLY
Qty: 12 CAPSULE | Refills: 0 | OUTPATIENT
Start: 2024-10-24

## 2024-10-24 NOTE — TELEPHONE ENCOUNTER
Requesting     Disp Refills Start End     ergocalciferol 1.25 MG (32577 UT) Oral Cap 12 capsule 0 6/21/2024 --    Sig - Route: Take 1 capsule (50,000 Units total) by mouth once a week. - Oral    Sent to pharmacy as: Vitamin D (Ergocalciferol) 1.25 MG (81549 UT) Oral Capsule (Vitamin D2)        Disp Refills Start End     MELOXICAM 15 MG Oral Tab 30 tablet 0 8/26/2024 --    Sig - Route: TAKE 1 TABLET BY MOUTH EVERY DAY - Oral    Sent to pharmacy as: Meloxicam 15 MG Oral Tablet        Disp Refills Start End     tiZANidine 4 MG Oral Tab 56 tablet 0 9/11/2024 --    Sig - Route: Take 1 tablet (4 mg total) by mouth every 6 (six) hours as needed. - Oral    Sent to pharmacy as: tiZANidine HCl 4 MG Oral Tablet (Zanaflex)      LOV: 8/26/2024 w/Masood for knee pain  RTC: prn    Filled:     Dispensed Written Strength Quantity Refills Days Supply Provider Pharmacy    VITAMIN D2 [id number] (ERGO) CAP RX 06/21/2024 06/21/2024  12 each  [facility]...       Dispensed Written Strength Quantity Refills Days Supply Provider Pharmacy    MELOXICAM 15MG TABLETS 08/26/2024 08/26/2024  30 each  [facility]       Dispensed Written Strength Quantity Refills Days Supply Provider Pharmacy    TIZANIDINE 4MG TABLETS 09/11/2024 09/11/2024  56 each  [facility]...     No future appointments.    Rx vitamin D denied. Therapy complete    Non-Narcotic Pain Medication Protocol Ddrwcs74/14/2024 09:44 PM   Protocol Details In person appointment or virtual visit in the past 6 mos or appointment in next 3 mos   Rx meloxicam sent per protocol    Rx tizanidine pended  No protocol  Routed to provider for review

## 2024-10-25 RX ORDER — MELOXICAM 15 MG/1
15 TABLET ORAL DAILY
Qty: 30 TABLET | Refills: 0 | OUTPATIENT
Start: 2024-10-25

## 2024-11-05 ENCOUNTER — OFFICE VISIT (OUTPATIENT)
Dept: FAMILY MEDICINE CLINIC | Facility: CLINIC | Age: 44
End: 2024-11-05
Payer: COMMERCIAL

## 2024-11-05 VITALS
HEART RATE: 97 BPM | BODY MASS INDEX: 37.49 KG/M2 | WEIGHT: 225 LBS | TEMPERATURE: 98 F | OXYGEN SATURATION: 99 % | SYSTOLIC BLOOD PRESSURE: 118 MMHG | HEIGHT: 65 IN | DIASTOLIC BLOOD PRESSURE: 82 MMHG | RESPIRATION RATE: 16 BRPM

## 2024-11-05 DIAGNOSIS — R05.9 COUGH, UNSPECIFIED TYPE: ICD-10-CM

## 2024-11-05 DIAGNOSIS — J06.9 VIRAL URI: Primary | ICD-10-CM

## 2024-11-05 LAB
OPERATOR ID: NORMAL
RAPID SARS-COV-2 BY PCR: NOT DETECTED

## 2024-11-05 PROCEDURE — U0002 COVID-19 LAB TEST NON-CDC: HCPCS | Performed by: NURSE PRACTITIONER

## 2024-11-05 PROCEDURE — 99213 OFFICE O/P EST LOW 20 MIN: CPT | Performed by: NURSE PRACTITIONER

## 2024-11-05 PROCEDURE — 3079F DIAST BP 80-89 MM HG: CPT | Performed by: NURSE PRACTITIONER

## 2024-11-05 PROCEDURE — 3008F BODY MASS INDEX DOCD: CPT | Performed by: NURSE PRACTITIONER

## 2024-11-05 PROCEDURE — 3074F SYST BP LT 130 MM HG: CPT | Performed by: NURSE PRACTITIONER

## 2024-11-05 NOTE — PATIENT INSTRUCTIONS
Push fluids and rest  You can take an over the counter cold/ flu medication if needed  Follow up for any new or worsening symptoms or if symptoms are not improving over the next few days.

## 2024-11-05 NOTE — PROGRESS NOTES
CHIEF COMPLAINT:     Chief Complaint   Patient presents with    Sore Throat     cough, sore throat, nasal/chest congestion, bilat ear ache, body aches   Sx onset 2 days  No recent Covid test   OTC Tylenol last night, nothing this morning        HPI:   Parisa Cooney is a 44 year old female who presents for cough, congestion, body aches, sore throat, ear pressure x 2 days.   No fever. Treating symptoms with tylenol cold.  Patient has not tested for COVID since symptom onset.    Current Outpatient Medications   Medication Sig Dispense Refill    TIZANIDINE 4 MG Oral Tab TAKE 1 TABLET(4 MG) BY MOUTH EVERY 6 HOURS AS NEEDED 56 tablet 0    MELOXICAM 15 MG Oral Tab TAKE 1 TABLET BY MOUTH EVERY DAY 30 tablet 0    Multiple Vitamins-Minerals (MULTI-VITAMIN/MINERALS) Oral Tab Take 1 tablet by mouth daily. 90 tablet 3    CLONAZEPAM 1 MG Oral Tab TAKE 1 TABLET(1 MG) BY MOUTH EVERY NIGHT AS NEEDED FOR ANXIETY 90 tablet 0    montelukast 10 MG Oral Tab Take 1 tablet (10 mg total) by mouth daily. 90 tablet 0    venlafaxine  MG Oral Capsule SR 24 Hr Take 1 capsule (150 mg total) by mouth daily. 90 capsule 0    VENLAFAXINE ER 75 MG Oral Capsule SR 24 Hr TAKE 1 CAPSULE BY MOUTH EVERY DAY 90 capsule 0    Omeprazole 40 MG Oral Capsule Delayed Release Take 1 capsule (40 mg total) by mouth daily. (Patient not taking: Reported on 10/23/2024) 90 capsule 0    ALPRAZolam 0.25 MG Oral Tab Take 1 tablet (0.25 mg total) by mouth daily as needed. 30 tablet 1      Past Medical History:    Anesthesia complication    Anxiety and depression    Anxiety state    B12 deficiency    Back problem    Calculus of kidney    COVID-19    Kidney stone    Neuropathy    Obesity (BMI 30-39.9)    Osteoarthritis    Paresthesias    PONV (postoperative nausea and vomiting)    Rosacea    Visual impairment    GLASSES    Vitamin D deficiency      Past Surgical History:   Procedure Laterality Date      , , 2009    Other Right 2017    thumb  surgery     Other surgical history  08/12/2016    Cystoscopy and Stent Removal    Tonsillectomy      Tubal ligation           Social History     Socioeconomic History    Marital status:    Tobacco Use    Smoking status: Never     Passive exposure: Never    Smokeless tobacco: Never   Vaping Use    Vaping status: Never Used   Substance and Sexual Activity    Alcohol use: No    Drug use: No    Sexual activity: Yes   Other Topics Concern    Caffeine Concern Yes     Comment: 2 cups daily    Stress Concern No    Weight Concern No    Special Diet No    Exercise No     Comment: 2-3 times a week     Seat Belt Yes         REVIEW OF SYSTEMS:   GENERAL: no fever. +body aches   SKIN: no rashes or abnormal skin lesions  HEENT: See HPI  LUNGS: denies shortness of breath or wheezing, See HPI  CARDIOVASCULAR: denies chest pain or palpitations   GI: denies N/V/C or abdominal pain  NEURO: Denies dizziness or weakness    EXAM:   /82   Pulse 97   Temp 98.4 °F (36.9 °C) (Oral)   Resp 16   Ht 5' 5\" (1.651 m)   Wt 225 lb (102.1 kg)   LMP 09/15/2024 (Approximate)   SpO2 99%   BMI 37.44 kg/m²   GENERAL: well developed, well nourished,in no apparent distress  SKIN: no rashes,no suspicious lesions  HEAD: atraumatic, normocephalic.  no tenderness on palpation of  sinuses  EYES: conjunctiva clear, EOM intact  EARS: TM's gray, no bulging, no retraction,+ fluid, bony landmarks visible  NOSE: Nostrils patent, + nasal discharge, nasal mucosa erythematous   THROAT: Oral mucosa pink, moist. Posterior pharynx is non erythematous. no exudates. .    NECK: Supple, non-tender  LUNGS: clear to auscultation bilaterally, no wheezes or rhonchi. Breathing is non labored.  CARDIO: RRR without murmur  EXTREMITIES: no cyanosis, clubbing or edema  LYMPH:  + anterior cervical lymphadenopathy.        ASSESSMENT AND PLAN:   Parisa Cooney is a 44 year old female who presents with upper respiratory symptoms that are consistent with    ASSESSMENT:    Encounter Diagnoses   Name Primary?    Cough, unspecified type     Viral URI Yes     COVID rapid pcr negative    PLAN:     Comfort care per pt instructions.   To follow up for any new or worsening symptoms or if not improving the next few days.       Meds & Refills for this Visit:  Requested Prescriptions      No prescriptions requested or ordered in this encounter       Risks, benefits, and side effects of medication explained and discussed.    Patient Instructions   Push fluids and rest  You can take an over the counter cold/ flu medication if needed  Follow up for any new or worsening symptoms or if symptoms are not improving over the next few days.       The patient indicates understanding of these issues and agrees to the plan.  The patient is asked to return if sx's persist or worsen.

## 2024-11-08 ENCOUNTER — PATIENT MESSAGE (OUTPATIENT)
Dept: FAMILY MEDICINE CLINIC | Facility: CLINIC | Age: 44
End: 2024-11-08

## 2024-11-15 ENCOUNTER — TELEMEDICINE (OUTPATIENT)
Dept: FAMILY MEDICINE CLINIC | Facility: CLINIC | Age: 44
End: 2024-11-15
Payer: COMMERCIAL

## 2024-11-15 DIAGNOSIS — J01.00 ACUTE NON-RECURRENT MAXILLARY SINUSITIS: Primary | ICD-10-CM

## 2024-11-15 PROCEDURE — 99213 OFFICE O/P EST LOW 20 MIN: CPT | Performed by: FAMILY MEDICINE

## 2024-11-15 NOTE — PROGRESS NOTES
Video Visit- [facility]  This is a telemedicine visit with live, interactive video and audio.     Parisa Cooney understands and accepts financial responsibility for any deductible, co-insurance and/or co-pays associated with this service for the date of 11/15/24.      Duration of the service: 5 minutes  2:59-3:04PM      Chief Complaint   Patient presents with    Upper Respiratory Infection        HPI:  Sick-  x 2 wks with an awful cough, now feels she has a butterfly in her ear. Saw walk-in clinic last week and said there was fluid, but no need for antibioitic. Miserable over the weekend. Still sick at the 2wks kelly and worsening. + pain/pressure in the face  No known allergies.    Taking dayquil and nyquil and won't resolve.           Past Medical History:    Anesthesia complication    Anxiety and depression    Anxiety state    B12 deficiency    Back problem    Calculus of kidney    COVID-19    Kidney stone    Neuropathy    Obesity (BMI 30-39.9)    Osteoarthritis    Paresthesias    PONV (postoperative nausea and vomiting)    Rosacea    Visual impairment    GLASSES    Vitamin D deficiency       Past Surgical History:   Procedure Laterality Date      , , 2009    Other Right 2017    thumb surgery     Other surgical history  2016    Cystoscopy and Stent Removal    Tonsillectomy      Tubal ligation         Family History   Problem Relation Age of Onset    Diabetes Mother            Physical Exam:  LMP 09/15/2024 (Approximate)     GENERAL APPEARANCE:  Alert, no acute distress, appears stated age  HEENT:  NCAT, EOMI, mucus membranes moist, localizes pain/pressure to the maxillary sinuses.  NECK:  No obvious goiter  PULMONARY:  Speaking in full sentences comfortably, normal work of breathing  ABDOMEN:  +obese  MUSCULOSKELETAL: Sitting upright.   NEUROLOGIC:  Cranial nerves 2-12 grossly intact.  PSYCHIATRIC:  Normal mood, affect, and  hygiene.        Assessment/Plan:  1. Acute non-recurrent maxillary sinusitis  - amoxicillin clavulanate 875-125 MG Oral Tab; Take 1 tablet by mouth 2 (two) times daily for 10 days.  Dispense: 20 tablet; Refill: 0  -cont other meds for symptoms as well        Return for if symptoms worsen/don't improve.      Dulce Paulson MD      Please note that the following visit was completed using two-way, real-time interactive audio and visual communication. This has been done in good tommie to provide continuity of care in the best interest of the provider-patient relationship, due to the ongoing public health crisis/national emergency and because of restrictions of visitation. There are limitations of this visit as physical examination was limited.  Appropriate medical decision-making and tests are ordered as detailed in the plan of care above.

## 2024-12-02 RX ORDER — MONTELUKAST SODIUM 10 MG/1
10 TABLET ORAL DAILY
Qty: 90 TABLET | Refills: 0 | Status: SHIPPED | OUTPATIENT
Start: 2024-12-02

## 2024-12-02 RX ORDER — MELOXICAM 15 MG/1
15 TABLET ORAL DAILY
Qty: 30 TABLET | Refills: 0 | Status: SHIPPED | OUTPATIENT
Start: 2024-12-02

## 2024-12-02 NOTE — TELEPHONE ENCOUNTER
Name from pharmacy: MELOXICAM 15MG TABLETS          Will file in chart as: MELOXICAM 15 MG Oral Tab    Sig: TAKE 1 TABLET BY MOUTH EVERY DAY    Disp: 30 tablet    Refills: 0 (Pharmacy requested: Not specified)    Start: 12/1/2024    Class: Normal    Non-formulary    Last ordered: 1 month ago (10/24/2024) by BRITNI Gabriel    Last refill: 10/24/2024    Rx #: [id number]    Non-Narcotic Pain Medication Protocol Passed 12/01/2024 12:53 PM   Protocol Details In person appointment or virtual visit in the past 6 mos or appointment in next 3 mos        LOV: 11/15/2024  RTC: as needed  Filled: 10/24/2024 #30 with 0 refills    Refill request approved per protocol.      No future appointments.

## 2024-12-05 RX ORDER — VENLAFAXINE HYDROCHLORIDE 75 MG/1
75 CAPSULE, EXTENDED RELEASE ORAL DAILY
Qty: 90 CAPSULE | Refills: 0 | Status: SHIPPED | OUTPATIENT
Start: 2024-12-05

## 2025-01-03 DIAGNOSIS — M62.830 PARASPINAL MUSCLE SPASM: ICD-10-CM

## 2025-01-03 DIAGNOSIS — M62.838 MUSCLE SPASMS OF NECK: ICD-10-CM

## 2025-01-04 RX ORDER — VENLAFAXINE HYDROCHLORIDE 150 MG/1
150 CAPSULE, EXTENDED RELEASE ORAL DAILY
Qty: 90 CAPSULE | Refills: 0 | Status: SHIPPED | OUTPATIENT
Start: 2025-01-04

## 2025-01-07 RX ORDER — MELOXICAM 15 MG/1
15 TABLET ORAL DAILY
Qty: 30 TABLET | Refills: 0 | Status: SHIPPED | OUTPATIENT
Start: 2025-01-07

## 2025-01-07 NOTE — TELEPHONE ENCOUNTER
Requested Prescriptions     Pending Prescriptions Disp Refills    MELOXICAM 15 MG Oral Tab [Pharmacy Med Name: MELOXICAM 15MG TABLETS] 30 tablet 0     Sig: TAKE 1 TABLET BY MOUTH EVERY DAY    TIZANIDINE 4 MG Oral Tab [Pharmacy Med Name: TIZANIDINE 4MG TABLETS] 56 tablet 0     Sig: TAKE 1 TABLET(4 MG) BY MOUTH EVERY 6 HOURS AS NEEDED       LOV: 8/26/24  RTC:   Last Relevant Labs:   Filled: 12/2/24 #30 with 0 refills  10/25/24 #56 with 0 refills       No future appointments.

## 2025-01-16 ENCOUNTER — PATIENT MESSAGE (OUTPATIENT)
Dept: FAMILY MEDICINE CLINIC | Facility: CLINIC | Age: 45
End: 2025-01-16

## 2025-01-17 ENCOUNTER — TELEMEDICINE (OUTPATIENT)
Dept: FAMILY MEDICINE CLINIC | Facility: CLINIC | Age: 45
End: 2025-01-17
Payer: COMMERCIAL

## 2025-01-17 DIAGNOSIS — R51.9 ACUTE NONINTRACTABLE HEADACHE, UNSPECIFIED HEADACHE TYPE: Primary | ICD-10-CM

## 2025-01-17 PROCEDURE — 98005 SYNCH AUDIO-VIDEO EST LOW 20: CPT | Performed by: FAMILY MEDICINE

## 2025-01-17 RX ORDER — BUTALBITAL, ACETAMINOPHEN AND CAFFEINE 300; 40; 50 MG/1; MG/1; MG/1
1 CAPSULE ORAL EVERY 4 HOURS PRN
Qty: 84 CAPSULE | Refills: 1 | Status: SHIPPED | OUTPATIENT
Start: 2025-01-17 | End: 2025-01-17

## 2025-01-17 NOTE — PROGRESS NOTES
Subjective:   Patient ID: Parisa Cooney is a 44 year old female.    HPI  Ms. Cooney is a very pleasant 44-year-old female with history of depression anxiety presenting for video visit today for headache.  This has been ongoing the past 4 days but seem to have worsened and increased in frequency and intensity in the past 2 days.  She localizes the pain on the right frontal region.  No recent injury or trauma.  She described with this like an ice pick and sharp.  No fever no dizziness no lightheadedness no visual disturbance no numbness no weakness that she reports.  She applied ice to it briefly with some help.  She had tried over-the-counter medications such as Excedrin but with not much improvement.  This headache does not wake her up when she is sleeping.      I had reviewed past medical and family histories together with allergy and medication lists documented.    History/Other:   Review of Systems   Constitutional:  Negative for fatigue and fever.   HENT:  Negative for congestion, sinus pressure, sore throat and trouble swallowing.    Eyes:  Negative for photophobia, pain and visual disturbance.   Respiratory:  Negative for cough and shortness of breath.    Cardiovascular:  Negative for chest pain.   Gastrointestinal:  Positive for nausea. Negative for abdominal pain, diarrhea and vomiting.   Genitourinary:  Negative for difficulty urinating.   Neurological:  Negative for dizziness, speech difficulty, weakness, light-headedness and numbness.   Psychiatric/Behavioral:  Negative for agitation and behavioral problems.      Current Outpatient Medications   Medication Sig Dispense Refill    Butalbital-APAP-Caffeine -40 MG Oral Cap Take 1 capsule by mouth every 4 (four) hours as needed for Pain. 84 capsule 1    MELOXICAM 15 MG Oral Tab TAKE 1 TABLET BY MOUTH EVERY DAY 30 tablet 0    TIZANIDINE 4 MG Oral Tab TAKE 1 TABLET(4 MG) BY MOUTH EVERY 6 HOURS AS NEEDED 56 tablet 0    venlafaxine  MG Oral Capsule  SR 24 Hr Take 1 capsule (150 mg total) by mouth daily. 90 capsule 0    VENLAFAXINE ER 75 MG Oral Capsule SR 24 Hr TAKE 1 CAPSULE BY MOUTH EVERY DAY 90 capsule 0    MONTELUKAST 10 MG Oral Tab TAKE 1 TABLET(10 MG) BY MOUTH DAILY 90 tablet 0    Multiple Vitamins-Minerals (MULTI-VITAMIN/MINERALS) Oral Tab Take 1 tablet by mouth daily. 90 tablet 3    CLONAZEPAM 1 MG Oral Tab TAKE 1 TABLET(1 MG) BY MOUTH EVERY NIGHT AS NEEDED FOR ANXIETY 90 tablet 0    Omeprazole 40 MG Oral Capsule Delayed Release Take 1 capsule (40 mg total) by mouth daily. (Patient not taking: Reported on 10/23/2024) 90 capsule 0    ALPRAZolam 0.25 MG Oral Tab Take 1 tablet (0.25 mg total) by mouth daily as needed. 30 tablet 1     Allergies:Allergies[1]    Objective:   Physical Exam  Constitutional:       General: She is not in acute distress.  Neurological:      General: No focal deficit present.      Mental Status: She is alert.   Psychiatric:         Mood and Affect: Mood normal.         Assessment & Plan:   1. Acute nonintractable headache, unspecified headache type    -Migraines versus tension or combination  - No red flag signs noted  - Go to the emergency room if symptoms worsen or persist and/or if she develops worsening headaches, numbness, weakness, visual disturbance, severe nausea and vomiting among other things  - Trial of Fioricet to be taken as needed for headaches  - MRI of the brain has been ordered if symptoms persist  - Call or come sooner if symptoms worsen or persist      This note was prepared using Dragon Medical voice recognition dictation software. As a result errors may occur. When identified these errors have been corrected. While every attempt is made to correct errors during dictation discrepancies may still exist            No orders of the defined types were placed in this encounter.      Meds This Visit:  Requested Prescriptions     Signed Prescriptions Disp Refills    Butalbital-APAP-Caffeine -40 MG Oral Cap 84  capsule 1     Sig: Take 1 capsule by mouth every 4 (four) hours as needed for Pain.       Imaging & Referrals:  MRI BRAIN (CPT=70551)         [1] No Known Allergies

## 2025-01-19 DIAGNOSIS — F51.01 PRIMARY INSOMNIA: ICD-10-CM

## 2025-01-19 DIAGNOSIS — F41.9 ANXIETY: ICD-10-CM

## 2025-01-20 RX ORDER — CLONAZEPAM 1 MG/1
1 TABLET ORAL NIGHTLY PRN
Qty: 90 TABLET | Refills: 0 | Status: SHIPPED | OUTPATIENT
Start: 2025-01-20

## 2025-01-21 ENCOUNTER — PATIENT MESSAGE (OUTPATIENT)
Dept: FAMILY MEDICINE CLINIC | Facility: CLINIC | Age: 45
End: 2025-01-21

## 2025-01-24 ENCOUNTER — TELEPHONE (OUTPATIENT)
Dept: FAMILY MEDICINE CLINIC | Facility: CLINIC | Age: 45
End: 2025-01-24

## 2025-01-24 DIAGNOSIS — J11.1 INFLUENZA: Primary | ICD-10-CM

## 2025-01-24 RX ORDER — OSELTAMIVIR PHOSPHATE 75 MG/1
75 CAPSULE ORAL 2 TIMES DAILY
Qty: 10 CAPSULE | Refills: 0 | Status: SHIPPED | OUTPATIENT
Start: 2025-01-24

## 2025-01-25 NOTE — TELEPHONE ENCOUNTER
Spoke with patient. She took her  to ER this morning with fever of 104. He tested positive for Influenza A. Now patient has started with fever 100, cough, headache, body aches and nausea. Denies vomiting, diarrhea or shortness of breath. No hx of asthma or pneumonia. Would like a Rx for Tamiflu. Tamiflu 75 mg one bid for 5 days was sent to her pharmacy, Patient advised to go to IC or ER if increased difficulty breathing or coughing up blood Follow up PCP if not improving. Patient voiced understanding of these instructions.

## 2025-02-05 ENCOUNTER — HOSPITAL ENCOUNTER (OUTPATIENT)
Dept: MRI IMAGING | Age: 45
Discharge: HOME | End: 2025-02-05
Attending: FAMILY MEDICINE
Payer: COMMERCIAL

## 2025-02-05 DIAGNOSIS — R51.9 ACUTE NONINTRACTABLE HEADACHE, UNSPECIFIED HEADACHE TYPE: ICD-10-CM

## 2025-02-05 PROCEDURE — 70551 MRI BRAIN STEM W/O DYE: CPT | Performed by: FAMILY MEDICINE

## 2025-02-06 ENCOUNTER — PATIENT MESSAGE (OUTPATIENT)
Dept: FAMILY MEDICINE CLINIC | Facility: CLINIC | Age: 45
End: 2025-02-06

## 2025-02-06 DIAGNOSIS — R51.9 ACUTE NONINTRACTABLE HEADACHE, UNSPECIFIED HEADACHE TYPE: Primary | ICD-10-CM

## 2025-02-06 NOTE — TELEPHONE ENCOUNTER
This is a benign growth on the sinus.  Sometimes it could increase the risk for sinus infections but doubt if the this is why she is having headaches.  At this point if she is still having headaches I would recommend for her to see neurology to be evaluated further for her headaches.

## 2025-02-10 DIAGNOSIS — R51.9 ACUTE NONINTRACTABLE HEADACHE, UNSPECIFIED HEADACHE TYPE: ICD-10-CM

## 2025-02-10 RX ORDER — BUTALBITAL, ACETAMINOPHEN AND CAFFEINE 50; 325; 40 MG/1; MG/1; MG/1
1 TABLET ORAL EVERY 4 HOURS PRN
Qty: 20 TABLET | Refills: 0 | OUTPATIENT
Start: 2025-02-10

## 2025-02-10 RX ORDER — MELOXICAM 15 MG/1
15 TABLET ORAL DAILY
Qty: 30 TABLET | Refills: 0 | Status: SHIPPED | OUTPATIENT
Start: 2025-02-10

## 2025-02-10 NOTE — TELEPHONE ENCOUNTER
Requested Prescriptions     Pending Prescriptions Disp Refills    butalbital-acetaminophen-caffeine -40 MG Oral Tab 20 tablet 0     Sig: Take 1 tablet by mouth every 4 (four) hours as needed for Pain. Can cancel prior script       Last Refill: 1/17/25    I do not believe she is a patient f ours anymore? This was refilled last month.

## 2025-03-16 DIAGNOSIS — R51.9 ACUTE NONINTRACTABLE HEADACHE, UNSPECIFIED HEADACHE TYPE: ICD-10-CM

## 2025-03-16 DIAGNOSIS — F51.01 PRIMARY INSOMNIA: ICD-10-CM

## 2025-03-16 DIAGNOSIS — F41.9 ANXIETY: ICD-10-CM

## 2025-03-16 RX ORDER — VENLAFAXINE HYDROCHLORIDE 75 MG/1
75 CAPSULE, EXTENDED RELEASE ORAL DAILY
Qty: 90 CAPSULE | Refills: 0 | Status: SHIPPED | OUTPATIENT
Start: 2025-03-16

## 2025-03-16 RX ORDER — VENLAFAXINE HYDROCHLORIDE 150 MG/1
150 CAPSULE, EXTENDED RELEASE ORAL DAILY
Qty: 90 CAPSULE | Refills: 0 | Status: SHIPPED | OUTPATIENT
Start: 2025-03-16

## 2025-03-16 RX ORDER — MONTELUKAST SODIUM 10 MG/1
10 TABLET ORAL DAILY
Qty: 90 TABLET | Refills: 0 | Status: SHIPPED | OUTPATIENT
Start: 2025-03-16

## 2025-03-16 RX ORDER — CLONAZEPAM 1 MG/1
1 TABLET ORAL NIGHTLY PRN
Qty: 90 TABLET | Refills: 0 | Status: SHIPPED | OUTPATIENT
Start: 2025-03-16

## 2025-03-16 RX ORDER — MELOXICAM 15 MG/1
15 TABLET ORAL DAILY
Qty: 30 TABLET | Refills: 0 | Status: SHIPPED | OUTPATIENT
Start: 2025-03-16

## 2025-03-17 RX ORDER — BUTALBITAL, ACETAMINOPHEN AND CAFFEINE 50; 325; 40 MG/1; MG/1; MG/1
1 TABLET ORAL EVERY 4 HOURS PRN
Qty: 20 TABLET | Refills: 0 | Status: SHIPPED | OUTPATIENT
Start: 2025-03-17

## 2025-03-17 NOTE — TELEPHONE ENCOUNTER
Requesting Butabital  Last OV: 1/17/25 Telemedicine  RTC: prn  Last Rx'd 1/17/25 #20 with 0 refills    No future appointments.    Per [place] , last dispensed 1/17/25 #20    Controlled med:  Rx pended and routed for approval/denial

## 2025-03-24 NOTE — PROGRESS NOTES
Obstetric/Gynecology Resident Interval Note    Tracing reviewed. Epidural recently placed.    Fetal Heart Monitor:  Baseline Heart Rate 110, moderate variability, present accelerations, absent decelerations  [place]: contractions, regular, every 2 minutes    Tracing category 1. Pitocin at 1 mu/min. Continue pitocin per protocol.     Veena Goldman MD  OB/GYN Resident, PGY1  [place], [place]  3/24/2025, 1:37 AM       Patient: Zoila Shahid  Medical Record Number: [id number]  YOB: 1980  PCP: Urszula Luna MD    Reason for visit: Cervical follow up    HISTORY OF CHIEF COMPLAINT:    Zoila Shahid is a very pleasant 44year old female, presents today constant tinnitus. She complains of right ear pain or pressure which he has been worked up for. No clear etiology at this time. She denies double vision loss of vision numbness in the face difficulty swallowing or choking.   She had one episode of vertig 3/12/2019    Performed by Marlyn Apodaca MD at [facility] ENDOSCOPY   • OTHER Right 12/01/2017    thumb surgery    • OTHER SURGICAL HISTORY  08/12/2016    Cystoscopy and Stent Removal   • SACROILIAC JOINT INJECTION RIGHT OR LEFT Right 8/16/2018    Performed by REVIEW OF SYSTEMS   Comprehensive review of systems done. Negative except what is outlined in the above HPI. PHYSICAL EXAMIMATION    height is 66\" and weight is 200 lb (90.7 kg). Her blood pressure is 114/80 and her pulse is 82.    GENERAL: Very No significant disc/facet abnormality, spinal stenosis, or foraminal narrowing. C3-C4:  No significant disc/facet abnormality, spinal stenosis, or foraminal narrowing.   C4-C5:  No significant disc/facet abnormality, spinal stenosis, or foraminal narrowing Multiplanar T1 and T2 weighted images including fat suppression sequences. Images acquired in sagittal and axial planes. PATIENT STATED HISTORY: (As transcribed by Technologist)  Lower back pain for 6 weeks.  No injury, but then a couple of days ago Medication: None prescribed  2. Imaging:    - Reviewed today:    - MRI cervical spine:     - Cervical DDD most prominent at C5-6 with disc protrusion and foraminal stenosis (L>R)    - MRI lumbar spine:     - Lumbar DDD without significant spinal stenosis.

## 2025-03-25 DIAGNOSIS — M62.830 PARASPINAL MUSCLE SPASM: ICD-10-CM

## 2025-03-25 DIAGNOSIS — M62.838 MUSCLE SPASMS OF NECK: ICD-10-CM

## 2025-03-25 NOTE — TELEPHONE ENCOUNTER
Requesting Tizanidine 4mg  Last OV: 1/17/25 Telemedicine  RTC: prn  Last Rx'd 1/7/25 #56 with 0 refills    No future appointments.    Non-protocol med:  Rx pended and routed for approval/denial

## 2025-04-14 RX ORDER — MELOXICAM 15 MG/1
15 TABLET ORAL DAILY
Qty: 30 TABLET | Refills: 0 | Status: SHIPPED | OUTPATIENT
Start: 2025-04-14

## 2025-05-06 DIAGNOSIS — M62.838 MUSCLE SPASMS OF NECK: ICD-10-CM

## 2025-05-06 DIAGNOSIS — M62.830 PARASPINAL MUSCLE SPASM: ICD-10-CM

## 2025-05-21 DIAGNOSIS — R51.9 ACUTE NONINTRACTABLE HEADACHE, UNSPECIFIED HEADACHE TYPE: ICD-10-CM

## 2025-05-21 RX ORDER — BUTALBITAL, ACETAMINOPHEN AND CAFFEINE 50; 325; 40 MG/1; MG/1; MG/1
1 TABLET ORAL EVERY 4 HOURS PRN
Qty: 20 TABLET | Refills: 0 | Status: SHIPPED | OUTPATIENT
Start: 2025-05-21

## 2025-05-21 RX ORDER — MELOXICAM 15 MG/1
15 TABLET ORAL DAILY
Qty: 30 TABLET | Refills: 0 | Status: SHIPPED | OUTPATIENT
Start: 2025-05-21

## 2025-05-21 NOTE — TELEPHONE ENCOUNTER
Requesting Butabital  Last Rx'd 3/17/25 #20 with 0 refills    Requesting Meloxicam 15mg  Last Rx'd 4/14/25 #30 with 0 refills    Last OV: 1/17/25 Telemedicine  RTC: prn    No future appointments.    [place] , last dispensed 3/17/25 #20    Controlled med:  Rx pended and routed for approval/denial

## 2025-06-07 ENCOUNTER — PATIENT MESSAGE (OUTPATIENT)
Dept: FAMILY MEDICINE CLINIC | Facility: CLINIC | Age: 45
End: 2025-06-07

## 2025-06-16 DIAGNOSIS — M62.838 MUSCLE SPASMS OF NECK: ICD-10-CM

## 2025-06-16 DIAGNOSIS — M62.830 PARASPINAL MUSCLE SPASM: ICD-10-CM

## 2025-06-17 ENCOUNTER — OFFICE VISIT (OUTPATIENT)
Dept: FAMILY MEDICINE CLINIC | Facility: CLINIC | Age: 45
End: 2025-06-17
Payer: COMMERCIAL

## 2025-06-17 VITALS
BODY MASS INDEX: 37.65 KG/M2 | DIASTOLIC BLOOD PRESSURE: 80 MMHG | OXYGEN SATURATION: 98 % | SYSTOLIC BLOOD PRESSURE: 122 MMHG | HEART RATE: 76 BPM | HEIGHT: 65 IN | RESPIRATION RATE: 16 BRPM | WEIGHT: 226 LBS | TEMPERATURE: 98 F

## 2025-06-17 DIAGNOSIS — Z12.11 SCREEN FOR COLON CANCER: ICD-10-CM

## 2025-06-17 DIAGNOSIS — Z01.419 WELL WOMAN EXAM: ICD-10-CM

## 2025-06-17 DIAGNOSIS — R10.32 LLQ PAIN: ICD-10-CM

## 2025-06-17 DIAGNOSIS — Z00.00 WELLNESS EXAMINATION: Primary | ICD-10-CM

## 2025-06-17 DIAGNOSIS — Z12.31 SCREENING MAMMOGRAM FOR BREAST CANCER: ICD-10-CM

## 2025-06-17 RX ORDER — VENLAFAXINE HYDROCHLORIDE 150 MG/1
150 CAPSULE, EXTENDED RELEASE ORAL DAILY
Qty: 90 CAPSULE | Refills: 0 | Status: SHIPPED | OUTPATIENT
Start: 2025-06-17

## 2025-06-17 RX ORDER — VENLAFAXINE HYDROCHLORIDE 75 MG/1
75 CAPSULE, EXTENDED RELEASE ORAL DAILY
Qty: 90 CAPSULE | Refills: 0 | Status: SHIPPED | OUTPATIENT
Start: 2025-06-17

## 2025-06-17 RX ORDER — MONTELUKAST SODIUM 10 MG/1
10 TABLET ORAL DAILY
Qty: 90 TABLET | Refills: 0 | Status: SHIPPED | OUTPATIENT
Start: 2025-06-17

## 2025-06-17 RX ORDER — MELOXICAM 15 MG/1
15 TABLET ORAL DAILY
Qty: 30 TABLET | Refills: 0 | Status: SHIPPED | OUTPATIENT
Start: 2025-06-17

## 2025-06-17 NOTE — TELEPHONE ENCOUNTER
Requesting Meloxicam 15mg  Last OV: 6/17/25 Physical  RTC: 1 year  Last Rx'd 5/21/25 #30 with 0 refills    Future Appointments   Date Time Provider Department Center   6/24/2025  9:00 AM OS CT RM 1 OS CT [place]   7/16/2025 12:40 PM PF [facility] RM2 PF MAMMO [place]       Non-protocol med:  Rx pended and routed for approval/denial

## 2025-06-17 NOTE — TELEPHONE ENCOUNTER
Requesting Montelukast 10mg  Last Rx'd 3/16/25 #90 with 0 refills  Asthma & COPD Medication Protocol [id number]/17/2025 12:35 PM   Protocol Details   ACT Score greater than or equal to 20    ACT recorded in the last 12 months    Appointment in past 6 or next 3 months    Medication is active on med list   Rx sent to pharmacy per protocol    Requesting Venlafaxine 75mg  Last Rx'd 3/16/25 #90 with 0 refills  Requesting Venlafaxine 150mg  Last Rx'd 3/16/25 #90 with 0 refills  Psychiatric Non-Scheduled (Anti-Anxiety) [id number]/17/2025 12:35 PM   Protocol Details   In person appointment or virtual visit in the past 6 mos or appointment in next 3 mos    Depression Screening completed within the past 12 months    Medication is active on med list   Rx sent to pharmacy per protocol    Requesting Tizanidine 4mg  Last Rx'd 5/7/25 #56 with 0 refills    Last OV: 6/17/25  RTC: 1 year    Future Appointments   Date Time Provider Department Center   6/24/2025  9:00 AM OS CT RM 1 OS CT [place]   7/16/2025 12:40 PM PF [facility] RM2 PF MAMMO [place]       Non-protocol med:  Rx pended and routed for approval/denial

## 2025-06-17 NOTE — PROGRESS NOTES
Wellness Exam    REASON FOR VISIT:    Parisa Cooney is a 45 year old female who presents for an Annual Health Assessment.    Current Complaints: Ms. Cooney is here for her wellness exam  Flu shot: see immunization record  Health Maintenance Topics with due status: Overdue       Topic Date Due    Colorectal Cancer Screening Never done    Pap Smear 08/12/2023    COVID-19 Vaccine 09/01/2024    Annual Depression Screening 01/01/2025    Mammogram 04/29/2025    Annual Physical 06/11/2025     Reported Health:   She is a very pleasant 45-year-old female presenting today for her wellness exam.  She has history of kidney stones several years ago and has been experiencing left lower quadrant pain and flank pain.  This has been ongoing for the past few weeks.  No fever no cough no chest pain no shortness of breath no nausea no vomiting no blood in the stool no blood in the urine no constipation no diarrhea.    I had reviewed past medical and family histories together with allergy and medication lists documented.    Details about the complaints:  As abovementioned    General Health     How would you describe your current health state?: Good    Type of Diet: Balanced    How do you maintain positive mental well-being?: Social Interaction    How would you describe your daily physical activity?: Moderate    If you are a male age 45-79 or a female age 55-79, do you take aspirin?: No    Have you had any immunizations at another office such as Influenza, Hepatitis B, Tetanus, or Pneumococcal?: No    At any time do you feel concerned for the safety/well-being of yourself and/or your children, in your home or elsewhere?: No     CAGE:     Cut: Have you ever felt you should Cut down on your drinking?: No    Annoyed: Have people Annoyed you by criticizing your drinking?: No    Guilty: Have you ever felt bad or Guilty about your drinking?: No    Eye Opener: Have you ever had a drink first thing in the morning to steady your nerves or to  get rid of a hangover (Eye opener)?: No    Scoring  Total Score: 0       PHQ-4: Over the LAST 2 WEEKS       Depression Screening (PHQ-2/PHQ-9): Over the LAST 2 WEEKS   Little interest or pleasure in doing things (over the last two weeks)?: Not at all    Feeling down, depressed, or hopeless (over the last two weeks)?: Not at all    PHQ-2 SCORE: 0              PREVENTATIVE SERVICES  INDICATIONS AND SCHEDULE Recommendation Internal Lab or Procedure External Lab or Procedure   Breast Cancer Screening   Every 2 yrs age 50-74 Health Maintenance   Topic Date Due    Mammogram  04/29/2025       Pap Every 3 yrs age 21-65 or Pap and HPV every 5 yrs age 30-65 Health Maintenance   Topic Date Due    Pap Smear  08/12/2023       Chlamydia Screening Screen Annually age<25, if sex active/on OCPs; >24 high risk No results found for: \"CHLAMYDIA\"    Colonoscopy Screen Every 10 years Health Maintenance   Topic Date Due    Colorectal Cancer Screening  Never done       Flex Sigmoidoscopy Screen  Every 5 years No results found for this or any previous visit.    Fecal Occult Blood  Annually No results found for: \"FOB\", \"OCCULTSTOOL\"    Obesity Screening Screen all adults annually Body mass index is 37.61 kg/m².      Preventive Services for Which Recommendations Vary with Risk Recommendation Internal Lab or Procedure External Lab or Procedure   Cholesterol Screening Recommended screening varies with age, risk and gender LDL Cholesterol (mg/dL)   Date Value   06/11/2024 91     LDL-CHOLESTEROL (mg/dL (calc))   Date Value   07/16/2018 100 (H)       Diabetes Screening  if history of high blood pressure or other  risk factors HEMOGLOBIN A1c (% of total Hgb)   Date Value   07/07/2017 5.4     HgbA1C (%)   Date Value   08/01/2019 5.6     Glucose (mg/dL)   Date Value   06/11/2024 97     GLUCOSE (mg/dL)   Date Value   07/16/2018 87         Gonorrhea Screening if high risk No results found for: \"GONOCOCCUS\"    HIV Screening For all adults age 18-65,  older adults at increased risk No results found for: \"HIV\"    Syphilis Screening Screen if pregnant or high risk No results found for: \"RPR\"    Hepatitis C Screening Screen those at high risk plus screen one time for adults born 1945-1 965 No results found for: \"HCVAB\"    Tuberculosis Screen if high risk No components found for: \"PPDINDURAT\"      ALLERGIES:   Allergies[1]    CURRENT MEDICATIONS:   Current Medications[2]   MEDICAL INFORMATION:   Past Medical History[3]   Past Surgical History[4]   Family History[5]   SOCIAL HISTORY:   Short Social Hx on File[6]       REVIEW OF SYSTEMS:   Constitutional: Negative for fever, chills and fatigue.   HENT: Negative for hearing loss, congestion, sore throat and neck pain.    Eyes: Negative for pain and visual disturbance.   Respiratory: Negative for cough and shortness of breath.    Cardiovascular: Negative for chest pain and palpitations.   Gastrointestinal: Negative for nausea, vomiting, diarrhea.   Genitourinary: Negative for urgency, frequency and difficulty urinating.   Musculoskeletal: Negative for arthralgias and no gait problem.   Skin: Negative for color change and rash.   Neurological: Negative for tremors, weakness and numbness.   Hematological: Negative for adenopathy. Does not bruise/bleed easily.   Psychiatric/Behavioral: Negative for confusion and agitation. The patient is not nervous/anxious.    EXAM:   /80   Pulse 76   Temp 97.9 °F (36.6 °C) (Temporal)   Resp 16   Ht 5' 5\" (1.651 m)   Wt 226 lb (102.5 kg)   LMP 05/27/2025 (Approximate)   SpO2 98%   BMI 37.61 kg/m²    Patient's last menstrual period was 05/27/2025 (approximate).   Constitutional: She appears her stated age, nourished, and pleasant. Vital signs reviewed as noted  Head: Normocephalic and atraumatic.   Nose: Nose normal.   Eyes: EOM are normal. Pupils are equal, round, and reactive to light. No scleral icterus.   Neck: Normal range of motion. No thyromegaly present.    Cardiovascular: Normal rate, regular rhythm and normal heart sounds.  Exam reveals no friction rub.    No murmur heard.  Pulmonary/Chest: Effort normal and breath sounds normal. She has no wheezes. She has no rales.   Abdominal: Soft. Bowel sounds are normal. There is left lower quadrant tenderness.  No rebound no guarding.  No CVA tenderness.  No masses nor hernia appreciated.  Musculoskeletal: Normal range of motion. She exhibits no edema.   Lymphadenopathy:    She has no cervical adenopathy or supraclavicular adenopathy.   Neurological: She is alert and oriented to person, place, and time. She has normal reflexes.   Skin: Skin is warm. No rash noted. No erythema. with normal hair  Psychiatric: She has a normal mood and affect and her behavior is normal.     ASSESSMENT AND OTHER RELEVANT CHRONIC CONDITIONS:   Parisa Cooney is a 45 year old female who presents for an Annual Health Assessment.   1. Wellness examination    2. Screening mammogram for breast cancer    3. Screen for colon cancer    4. LLQ pain    5. Well woman exam        PLAN SUMMARY:   Parisa Cooney is a 45 year old female  Age appropriate cancer screening, labs, safety, immunizations were discussed with the patient and ordered as follows:    Parisa was seen today for physical and abdominal pain.    Diagnoses and all orders for this visit:    Wellness examination  -     CBC With Differential With Platelet; Future  -     Comp Metabolic Panel (14); Future  -     Lipid Panel; Future  -     TSH and Free T4; Future  -     Urinalysis, Routine [E]; Future    Screening mammogram for breast cancer  -     [facility] HERMILO 2D+3D SCREENING BILAT (CPT=77067/13852); Future    Screen for colon cancer  -     Gastro Referral - In Network    LLQ pain  -     CT ABDOMEN+PELVIS(CPT=74176); Future    Well woman exam  -     OBG Referral - [facility] ([place])    Plan as above mentioned  We will notify her once we get this results and provide her more recommendations  thereafter      This note was prepared using Dragon Medical voice recognition dictation software. As a result errors may occur. When identified these errors have been corrected. While every attempt is made to correct errors during dictation discrepancies may still exist            Orders Placed This Encounter   Procedures    CBC With Differential With Platelet    Comp Metabolic Panel (14)    Lipid Panel    TSH and Free T4    Urinalysis, Routine [E]     This note was prepared using Dragon Medical voice recognition dictation software. As a result errors may occur. When identified these errors have been corrected. While every attempt is made to correct errors during dictation discrepancies may still exist          Imaging & Consults:  GASTRO - INTERNAL  OBG - INTERNAL  [facility] HERMILO 2D+3D SCREENING BILAT (CPT=77067/30388)  CT ABDOMEN+PELVIS(CPT=74176)    Her 5 year prevention plan includes: annual visits for fasting labs  Health Maintenance   Topic Date Due    Colorectal Cancer Screening  Never done    Pap Smear  08/12/2023    COVID-19 Vaccine (4 - 2024-25 season) 09/01/2024    Annual Depression Screening  01/01/2025    Mammogram  04/29/2025    Annual Physical  06/11/2025    Influenza Vaccine (Season Ended) 10/01/2025    DTaP,Tdap,and Td Vaccines (4 - Tdap) 11/01/2025    Pneumococcal Vaccine: Birth to 50yrs  Aged Out    Meningococcal B Vaccine  Aged Out     Patient/Caregiver Education:  Patient/Caregiver Education: There are no barriers to learning. Medical education done.  Outcome: Patient verbalizes understanding.    Educated by: MD     The patient indicates understanding of these issues and agrees to the plan.    SUGGESTED VACCINATIONS - Influenza, Pneumococcal, Zoster, Tetanus     Immunization History   Administered Date(s) Administered    >=3 YRS FLUZONE OR FLUARIX QUAD PRESERVE FREE SINGLE DOSE (55566) FLU CLINIC 10/17/2017    Covid-19 Vaccine Moderna 100 mcg/0.5 ml 02/12/2021, 03/12/2021, 10/31/2021    DTAP  10/01/2003, 10/01/2003, 11/01/2015    FLULAVAL 6 months & older 0.5 ml Prefilled syringe (41899) 10/01/2018, 09/10/2019, 12/01/2020, 11/01/2022    FLUZONE 6 months and older PFS 0.5 ml (10883) 10/17/2017, 10/01/2018, 09/10/2019, 12/01/2020, 09/26/2023    Influenza 11/01/2015, 11/01/2016, 10/17/2017       Influenza Annually   Pneumococcal if high risk   Td/Tdap once then every 10 years   HPV Females 11-26: 3 doses   Zoster (Shingles) 60 and older: one dose   Varicella 2 doses if not immune   MMR 1-2 doses if born after 1956 and not immune     Problem List[7]  PREVENTIVE VISIT,EST,40-64         [1] No Known Allergies  [2]   Current Outpatient Medications   Medication Sig Dispense Refill    BUTALBITAL-ACETAMINOPHEN-CAFFEINE -40 MG Oral Tab TAKE 1 TABLET BY MOUTH EVERY 4 HOURS AS NEEDED FOR PAIN 20 tablet 0    MELOXICAM 15 MG Oral Tab TAKE 1 TABLET(15 MG) BY MOUTH DAILY 30 tablet 0    TIZANIDINE 4 MG Oral Tab TAKE 1 TABLET(4 MG) BY MOUTH EVERY 6 HOURS AS NEEDED 56 tablet 0    VENLAFAXINE ER 75 MG Oral Capsule SR 24 Hr TAKE 1 CAPSULE BY MOUTH EVERY DAY 90 capsule 0    MONTELUKAST 10 MG Oral Tab TAKE 1 TABLET(10 MG) BY MOUTH DAILY 90 tablet 0    VENLAFAXINE  MG Oral Capsule SR 24 Hr TAKE 1 CAPSULE(150 MG) BY MOUTH DAILY 90 capsule 0    CLONAZEPAM 1 MG Oral Tab TAKE 1 TABLET(1 MG) BY MOUTH EVERY NIGHT AS NEEDED FOR ANXIETY 90 tablet 0    Multiple Vitamins-Minerals (MULTI-VITAMIN/MINERALS) Oral Tab Take 1 tablet by mouth daily. 90 tablet 3    ALPRAZolam 0.25 MG Oral Tab Take 1 tablet (0.25 mg total) by mouth daily as needed. 30 tablet 1    Omeprazole 40 MG Oral Capsule Delayed Release Take 1 capsule (40 mg total) by mouth daily. (Patient not taking: Reported on 6/17/2025) 90 capsule 0   [3]   Past Medical History:   Anesthesia complication    Anxiety and depression    Anxiety state    B12 deficiency    Back problem    Calculus of kidney    COVID-19    Kidney stone    Neuropathy    Obesity (BMI 30-39.9)     Osteoarthritis    Paresthesias    PONV (postoperative nausea and vomiting)    Rosacea    Visual impairment    GLASSES    Vitamin D deficiency   [4]   Past Surgical History:  Procedure Laterality Date      , , 2009    Other Right 2017    thumb surgery     Other surgical history  2016    Cystoscopy and Stent Removal    Tonsillectomy      Tubal ligation     [5]   Family History  Problem Relation Age of Onset    Diabetes Mother    [6]   Social History  Socioeconomic History    Marital status:    Tobacco Use    Smoking status: Never     Passive exposure: Never    Smokeless tobacco: Never   Vaping Use    Vaping status: Never Used   Substance and Sexual Activity    Alcohol use: No    Drug use: No    Sexual activity: Yes   Other Topics Concern    Caffeine Concern Yes     Comment: 2 cups daily    Stress Concern No    Weight Concern No    Special Diet No    Exercise No     Comment: 2-3 times a week     Seat Belt Yes   [7]   Patient Active Problem List  Diagnosis    Pain in joint, shoulder region    Rosacea    Heel pain    Obesity (BMI 30-39.9)    Nephrolithiasis    Paresthesias    Neuropathy (HCC)    B12 deficiency    Vitamin D deficiency    Numbness and tingling of both feet    Numbness and tingling of hand    Heaviness of upper extremity    Umbilical hernia    Primary insomnia    Anxiety and depression    Hemorrhagic cyst of ovary    DDD (degenerative disc disease), cervical    Cervical disc herniation    Cervical radiculopathy    Anxiety    Foraminal stenosis of cervical region

## 2025-06-17 NOTE — PATIENT INSTRUCTIONS
Thank you for choosing Luc Richardson MD at [facility]  To Do: Parisa Cooney  1. Please see age appropriate health prevention below     Call [phone] to schedule the appointment.   Please signup for simplifyMD, which is electronic access to your record if you have not done so.  All your results will post on there.  [email or web address]   You can NOW use simplifyMD to book your appointments with us, or consider using open access scheduling which is through the [place] website [email or web address] and type in Luc Richardson MD and follow the links for \"Schedule Online Now\"    To schedule Imaging or tests at [place] call Central Scheduling [phone], Go to [facility] ER Building (For example: CT scans, X rays, Ultrasound, MRI)  Cardiac Testing in ER building Building A second floor Cardiac Testing [phone] (For example: Holter Monitor, Cardiac Stress tests,Event Monitor, or 2D Echocardiograms)  [facility] call [phone] usually in [facility]  Walk in Clinic in [place] at [address] Mon-Fri at 8am-7:30 p.m., and Sat/Sun 9:00a.m.-4:30 p.m.  Also at [address] [place] [address]  Call [phone] for info     Please call our office about any questions regarding your treatment/medicines/tests as a result of today's visit.  For your safety, read the entire package insert of all medicines prescribed to you and be aware of all of the risks of treatment even beyond those discussed today.  All therapies have potential risk of harm or side effects or medication interactions.  It is your duty and for your safety to discuss with the pharmacist and our office with questions, and to notify us and stop treatment if problems arise, but know that our intention is that the benefits outweigh those potential risks and we strive to make you healthier and to improve your quality of life.    Referrals, and Radiology Information:    If your insurance requires a referral to a specialist,  please allow 5 business days to process your referral request.    If Luc Richardson MD orders a CT or MRI, it may take up to 10 business days to receive approval from your insurance company. Once our office has called informing you that the insurance company approved your testing, please call Central Scheduling at [phone]  Please allow our office 5 business days to contact you regarding any testing results.    Refill policies:   Allow 3 business days for refills; controlled substances may take longer and must be picked up from the office in person.  Narcotic medications can only be filled in 30 day increments and must be refilled at an office visit only.  If your prescription is due for a refill, you may be due for a follow-up appointment.  We cannot refill your maintenance medications at a preventative wellness visit.  To best provide you care, patients receiving maintenance medications need to be seen at least twice a year.

## 2025-06-24 ENCOUNTER — HOSPITAL ENCOUNTER (OUTPATIENT)
Dept: CT IMAGING | Age: 45
Discharge: HOME | End: 2025-06-24
Attending: FAMILY MEDICINE
Payer: COMMERCIAL

## 2025-06-24 DIAGNOSIS — R10.32 LLQ PAIN: ICD-10-CM

## 2025-06-24 PROCEDURE — 74176 CT ABD & PELVIS W/O CONTRAST: CPT | Performed by: FAMILY MEDICINE

## 2025-07-05 ENCOUNTER — HOSPITAL ENCOUNTER (OUTPATIENT)
Dept: MAMMOGRAPHY | Age: 45
Discharge: HOME | End: 2025-07-05
Attending: FAMILY MEDICINE
Payer: COMMERCIAL

## 2025-07-05 DIAGNOSIS — Z12.31 SCREENING MAMMOGRAM FOR BREAST CANCER: ICD-10-CM

## 2025-07-05 PROCEDURE — 77063 BREAST TOMOSYNTHESIS BI: CPT | Performed by: FAMILY MEDICINE

## 2025-07-05 PROCEDURE — 77067 SCR MAMMO BI INCL CAD: CPT | Performed by: FAMILY MEDICINE

## 2025-07-21 DIAGNOSIS — F51.01 PRIMARY INSOMNIA: ICD-10-CM

## 2025-07-21 DIAGNOSIS — F41.9 ANXIETY: ICD-10-CM

## 2025-07-22 DIAGNOSIS — M62.838 MUSCLE SPASMS OF NECK: ICD-10-CM

## 2025-07-22 DIAGNOSIS — M62.830 PARASPINAL MUSCLE SPASM: ICD-10-CM

## 2025-07-23 RX ORDER — MELOXICAM 15 MG/1
15 TABLET ORAL DAILY
Qty: 30 TABLET | Refills: 0 | Status: SHIPPED | OUTPATIENT
Start: 2025-07-23

## 2025-07-23 RX ORDER — CLONAZEPAM 1 MG/1
1 TABLET ORAL NIGHTLY PRN
Qty: 90 TABLET | Refills: 0 | Status: SHIPPED | OUTPATIENT
Start: 2025-07-23

## 2025-07-23 NOTE — TELEPHONE ENCOUNTER
Requesting Tizanidine 4mg  Last OV: 6/17/25 Physical  RTC: 1 year  Last Rx'd 6/17/25 #56 with 0 refills    Future Appointments   Date Time Provider Department Center   8/4/2025 12:40 PM PF T[facility] RM1 PF [facility] [place]   9/5/2025  7:30 AM Janette Hicks APRN [facility] [address]       Non-protocol med:  Rx pended and routed for approval/denial

## 2025-07-23 NOTE — TELEPHONE ENCOUNTER
Requesting Clonazepam 1mg  Last Rx'd 3/16/25 #90 with 0 refills    Requesting Meloxicam 15mg  Last Rx'd 6/17/25 #30 with 0 refills    Last OV: 6/17/25 Physical  RTC: 1 year    Future Appointments   Date Time Provider Department Center   8/4/2025 12:40 PM PF TRE[facility] RM1 PF [facility] [place]   9/5/2025  7:30 AM Janette Hicks, APRN [facility] [address]       Per [place] , Clonazepam last dispensed 4/20/25 #90    Controlled med:  Rx pended and routed for approval/denial

## 2025-08-04 ENCOUNTER — HOSPITAL ENCOUNTER (OUTPATIENT)
Dept: MAMMOGRAPHY | Age: 45
Discharge: HOME | End: 2025-08-04
Attending: FAMILY MEDICINE

## 2025-08-04 ENCOUNTER — HOSPITAL ENCOUNTER (OUTPATIENT)
Dept: ULTRASOUND IMAGING | Age: 45
Discharge: HOME | End: 2025-08-04
Attending: FAMILY MEDICINE

## 2025-08-04 DIAGNOSIS — R92.2 INCONCLUSIVE MAMMOGRAM: ICD-10-CM

## 2025-08-04 PROCEDURE — 76642 ULTRASOUND BREAST LIMITED: CPT | Performed by: FAMILY MEDICINE

## 2025-08-04 PROCEDURE — 77061 BREAST TOMOSYNTHESIS UNI: CPT | Performed by: FAMILY MEDICINE

## 2025-08-04 PROCEDURE — 77065 DX MAMMO INCL CAD UNI: CPT | Performed by: FAMILY MEDICINE

## 2025-08-25 DIAGNOSIS — M62.838 MUSCLE SPASMS OF NECK: ICD-10-CM

## 2025-08-25 DIAGNOSIS — M62.830 PARASPINAL MUSCLE SPASM: ICD-10-CM

## 2025-08-28 RX ORDER — MELOXICAM 15 MG/1
15 TABLET ORAL DAILY
Qty: 30 TABLET | Refills: 0 | Status: SHIPPED | OUTPATIENT
Start: 2025-08-28

## (undated) DEVICE — DRAPE MICROSCOPE NEURO PENTERO

## (undated) DEVICE — FLOSEAL HEMOSTATIC MATRIX, 5ML: Brand: FLOSEAL HEMOSTATIC MATRIX

## (undated) DEVICE — GOWN,SIRUS,FABRIC-REINFORCED,X-LARGE: Brand: MEDLINE

## (undated) DEVICE — BANDAID COVERLET 1X3

## (undated) DEVICE — 3M™ TEGADERM™ TRANSPARENT FILM DRESSING, 1626W, 4 IN X 4-3/4 IN (10 CM X 12 CM), 50 EACH/CARTON, 4 CARTON/CASE: Brand: 3M™ TEGADERM™

## (undated) DEVICE — 3M™ MEDIPORE™ SOFT CLOTH TAPE, 4 INCH X 10 YARDS, 12 ROLLS/CASE, 2964: Brand: 3M™ MEDIPORE™

## (undated) DEVICE — GLOVE SURG SENSICARE SZ 7-1/2

## (undated) DEVICE — Device

## (undated) DEVICE — TZ MEDICAL TITANIUM DISTRACTION PINS 12MM: Brand: TZ MEDICAL TITANIUM DISTRACTION PINS

## (undated) DEVICE — INSTRUMENT 7080902 PLATE HOLDING PIN

## (undated) DEVICE — AVANOS* TUOHY EPIDURAL NEEDLE: Brand: AVANOS

## (undated) DEVICE — SOL  .9 1000ML BTL

## (undated) DEVICE — CAP,BOUFFANT,SPUNBOND,BLUE,24": Brand: MEDLINE INDUSTRIES, INC.

## (undated) DEVICE — UNDYED BRAIDED (POLYGLACTIN 910), SYNTHETIC ABSORBABLE SUTURE: Brand: COATED VICRYL

## (undated) DEVICE — PAIN TRAY: Brand: MEDLINE INDUSTRIES, INC.

## (undated) DEVICE — DRAPE TABLE COVER 44X90 TC-10

## (undated) DEVICE — GAMMEX® NON-LATEX PI TEXTURED SIZE 7.5, STERILE POLYISOPRENE POWDER-FREE SURGICAL GLOVE: Brand: GAMMEX

## (undated) DEVICE — SUTURE VICRYL 0 CT-1

## (undated) DEVICE — KENDALL SCD EXPRESS SLEEVES, KNEE LENGTH, MEDIUM: Brand: KENDALL SCD

## (undated) DEVICE — LAMINECTOMY CDS: Brand: MEDLINE INDUSTRIES, INC.

## (undated) DEVICE — DRILL BIT 7080510 11 MM DRILL BIT S

## (undated) DEVICE — CAUTERY BLADE 2IN INS E1455

## (undated) DEVICE — SUTURE VICRYL 3-0 SH

## (undated) DEVICE — DRAPE C-ARM UNIVERSAL

## (undated) DEVICE — GLOVE SURG SENSICARE SZ 6-1/2

## (undated) DEVICE — TRANSPOSAL ULTRAFLEX DUO/QUAD ULTRA CART MANIFOLD

## (undated) DEVICE — PAD SACRAL SPAN AID

## (undated) DEVICE — GLOVE SURG SENSICARE SZ 7

## (undated) DEVICE — REMOVER DURAPREP 3M

## (undated) DEVICE — SYRINGE 10ML LL CONTRL SYRINGE

## (undated) DEVICE — GLOVE SURG TRIUMPH SZ 8

## (undated) DEVICE — LIGHT HANDLE

## (undated) DEVICE — #15 STERILE STAINLESS BLADE: Brand: STERILE STAINLESS BLADES

## (undated) DEVICE — 3M(TM) TEGADERM(TM) TRANSPARENT FILM DRESSING FRAME STYLE 1628: Brand: 3M™ TEGADERM™

## (undated) DEVICE — DRAPE,THYROID,SOFT,STERILE: Brand: MEDLINE

## (undated) DEVICE — SUTURE VICRYL 3-0 RB-1

## (undated) DEVICE — 3.0MM PRECISION ROUND

## (undated) NOTE — LETTER
08/11/17        [facility] [address]  [place] [address] [facility] [address]      Dear Nii Gutierres,    [facility] records indicate that you have outstanding lab work and or testing that was ordered for you and has not yet been completed:          BASIC METABOLIC PANEL [1

## (undated) NOTE — LETTER
Pre-Procedure  Scotty Lopez MD        I acknowledge that I have been informed of the risk involved by refusing the urine pregnancy on [address] [place] [address].     I, thereby, rele

## (undated) NOTE — ED AVS SNAPSHOT
Sarah Combs   MRN: [id number]    Department:  [facility] in [place]   Date of Visit:  2/14/2019           Disclosure     Insurance plans vary and the physician(s) referred by the ER may not be covered by your plan.  Please conta tell this physician (or your personal doctor if your instructions are to return to your personal doctor) about any new or lasting problems. The primary care or specialist physician will see patients referred from the [facility] Emergency Department.  Shelton Lombard

## (undated) NOTE — ED AVS SNAPSHOT
Cortez Rodarte   MRN: [id number]    Department:  [facility] Emergency Department in [place]   Date of Visit:  8/27/2017           Disclosure     Insurance plans vary and the physician(s) referred by the ER may not be covered by your plan.  Please conta If you have been prescribed any medication(s), please fill your prescription right away and begin taking the medication(s) as directed    If the emergency physician has read X-rays, these will be re-interpreted by a radiologist.  If there is a significant

## (undated) NOTE — LETTER
19    RE: Dontae Ulrich     : 1980    Dear Reyes Shukla,    This letter is to inform you that your patient is being scheduled for surgery with Dr. Elvira Diaz on 2/10/20 at [facility]. We have asked the patient to contact your office to s

## (undated) NOTE — LETTER
18    Patient: Sarah Combs  : 1980 Visit date: 2018    Dear  Dr. Ivette Lopez MD,    Thank you for referring Sarah Combs to my practice. Please find my assessment and plan below.                 History of Present Illness   45 ye

## (undated) NOTE — LETTER
19    Patient: Beltran Hamlin  : 1980 Visit date: 2019    Dear  Dr. Guzman Hernandez MD, MARGARITA Richards,    Thank you for referring Beltran Hamlin to my practice. Please find my assessment and plan below.              History of Ryan

## (undated) NOTE — LETTER
Benita Pope MD        I acknowledge that I have been informed of the risk involved by refusing the pregnancy test on [address] [place] [address].     I, thereby, Timoteo Patel

## (undated) NOTE — MR AVS SNAPSHOT
[facility] [address]  [place]  [phone]               Thank you for choosing us for your health care visit with Sonja Mccullough PA-C.   We are glad to serve you and happy to provide you 7am-3pm plus most Saturday 830am-12p. call [phone] to schedule  •To schedule Imaging or tests at [facility] Scheduling [phone], Go to [facility] A ER Building (For example: CT scans, X rays, Ultrasound, MRI)  •Cardiac Testing in ER building Bu and must be picked up from the office in person. Narcotic medications can only be filled in 30 day increments and must be refilled at an office visit only. If your prescription is due for a refill, you may be due for a follow-up appointment.   We cannot r Summaries. If you've been to the Emergency Department or your doctor's office, you can view your past visit information in [employer] by going to Visits < Visit Summaries. Linea questions? Call [phone] for help.   Linea is NOT to be used for urge

## (undated) NOTE — Clinical Note
Dear Dr Kodi Valdez,  Thank you for referring your patient to [address] [place] [address] Drive, [address]. We value our relationship with you and appreciate your confidence in our service and staff.    It is with great pleasure that we were able to provide treatment to Reny Cheema to

## (undated) NOTE — LETTER
Daniel Ramos MD        I acknowledge that I have been informed of the risk involved by refusing the pregnancy test on [address] [place] [address].     I, thereby, Brien Velez

## (undated) NOTE — ED AVS SNAPSHOT
Zoila Shahid   MRN: [id number]    Department:  [facility] Emergency Department in [place]   Date of Visit:  11/20/2017           Disclosure     Insurance plans vary and the physician(s) referred by the ER may not be covered by your plan.  Please cont If you have been prescribed any medication(s), please fill your prescription right away and begin taking the medication(s) as directed    If the emergency physician has read X-rays, these will be re-interpreted by a radiologist.  If there is a significant

## (undated) NOTE — MR AVS SNAPSHOT
[facility] [address]  [address] [address] [address] [phone]               Thank you for choosing us for your health care visit with Sandra Schaffer MD.  We are glad to serve you and happy to provide you with

## (undated) NOTE — MR AVS SNAPSHOT
[facility]  [address] [place] [address] [phone]               Thank you for choosing us for your health care visit with Thania Degroot PT.   We are glad to serve you and happy to provide you with this summ office, you can view your past visit information in [employer] by going to Visits < Visit Summaries. [employer] questions? Call [phone] for help. [employer] is NOT to be used for urgent needs. For medical emergencies, dial 911.            Visit [facility]

## (undated) NOTE — LETTER
Date: 11/3/2020    Patient Name: Alisia Castro          To Whom it may concern: This letter has been written at the patient's request. The above patient tested positive for COVID-19 on 10/30/20.        Sincerely,    Alan Calero MD

## (undated) NOTE — LETTER
Guero Romano MD        I acknowledge that I have been informed of the risk involved by refusing the pregnancy test on [address] [place] [address].     I, thereby, Jovon Blankenship

## (undated) NOTE — LETTER
01/08/19        [facility] [address]  [facility] [address]      Dear Alexys Mckeon,    [facility] records indicate that you have outstanding lab work and or testing that was ordered for you and has not yet been completed:  Orders Placed This Encounter

## (undated) NOTE — MR AVS SNAPSHOT
[facility] [address]  [facility]  [phone]               Thank you for choosing us for your health care visit with Miguelangel Benito PA-C.   We are glad to serve you and happy to provide you Normal Orders This Visit    OP REFERRAL TO Velma Luciano [[id number] CUSTOM]  Order #:  [id number]         **THIS IS NOT A REFERRAL**  Your physician has recommended you to [facility].   If your insurance requires you to Gato Valdovinos 2. Pt to start physical therapy  3. Pt to get MRI  4. Follow-up in 1 month, sooner if problems    • Please signup for MY CHART, which is electronic access to your record if you have not done so.  All your results will post on there.  [email or web address] If your insurance requires a referral to a specialist, please allow 5 business days to process your referral request.    If Claudia Perkins PA-C orders a CT or MRI, it may take up to 10 business days to receive approval from your insurance company.  Once If you've recently had a stay at the Hospital you can access your discharge instructions in [employer] by going to Visits < Admission Summaries.  If you've been to the Emergency Department or your doctor's office, you can view your past visit information in My

## (undated) NOTE — MR AVS SNAPSHOT
[facility]  [address] [facility]  [place] [address] [facility] [address] [phone]               Thank you for choosing us for your health care visit with Mohan Estrada PA-C. We are glad to serve you and happy to provide you with this summary of your visit.   Chantal · Acetaminophen or ibuprofen will help ease your fever, muscle aches, and headache. Don’t give aspirin to anyone younger than 25 who has the flu. Aspirin can harm the liver. · Nausea and loss of appetite are common with the flu. Eat light meals.  Drink 6 t professional's instructions.              Allergies as of Feb 28, 2017     No Known Allergies                Today's Vital Signs     BP Pulse Temp Height Weight BMI    116/68 mmHg 72 98.2 °F (36.8 °C) (Oral) 66\" 213 lb 34.40 kg/m2         Current Medicatio including white bread, rice and pasta   Eat plenty of protein, keep the fat content low Sugars:  sodas and sports drinks, candies and desserts   Eat plenty of low-fat dairy products High fat meats and dairy   Choose whole grain products Foods high in sodiu

## (undated) NOTE — LETTER
10/17/20        [facility] [address]  [facility] [address]      Dear Poli Zaragoza,    [facility] records indicate that you have outstanding lab work and or testing that was ordered for you and has not yet been completed:  Orders Placed This Encounte

## (undated) NOTE — LETTER
3/9/2020    Patient Name: Bryson Dumont          To Whom it may concern: This letter has been written at the patient's request. The above patient was seen at the [facility] for treatment of a medical condition.     The patient may re

## (undated) NOTE — LETTER
19        RE: Ga Benton     : 1980    Dear Dr. Shereen Davison,    This letter is to inform you that your patient is being scheduled for surgery with Dr. Terrel Nyhan on 2/10/20 at [facility]. We have asked the patient to contact your office to

## (undated) NOTE — LETTER
06/30/17    Dear Dr. Hendrick Moritz      Thank you for referring your patient, Emmanuel Muñoz to me for an evaluation. Please see my initial consult note enclosed below. Let me know if you have any questions.     Thank you  Boom Saha MD, Neurology  Neel palpitations, shortness of breath, rashes, joint pains, bowel / bladder incontinence or mood issues.      Past Medical History:   Diagnosis Date   • Kidney stone    • Obesity (BMI 30-39.9) 11/12/2015   • Rosacea 12/3/2013     Past Surgical History:  No date Visual fields: Normal  Oculomotor/Trochlear/Abducens:    Eye Movements: EOMI without nystagmus  Trigeminal:   Facial sensation:intact to light touch bilaterally  Facial:   Smile symmetric, eyebrow raise symmetric  Vestibulocochlear:   Hearing: roots of the cauda equina are unremarkable. No focal mass or fluid collection is seen in the lumbar spinal   canal.  The paraspinal soft tissues are unremarkable. T12-L1: There is no significant abnormality.      L1-2 through L4-5: There are minimal di Plan: VITAMIN B12, HEMOGLOBIN A1C, EMG ([address] [place] [address])        As noted above     (E53.8) B12 deficiency  Plan: VITAMIN B12, EMG ([place])        As noted above     Return in about 6 we